# Patient Record
Sex: MALE | Race: WHITE | NOT HISPANIC OR LATINO | Employment: OTHER | URBAN - METROPOLITAN AREA
[De-identification: names, ages, dates, MRNs, and addresses within clinical notes are randomized per-mention and may not be internally consistent; named-entity substitution may affect disease eponyms.]

---

## 2017-02-22 ENCOUNTER — CLINICAL SUPPORT (OUTPATIENT)
Dept: SMOKING CESSATION | Facility: CLINIC | Age: 57
End: 2017-02-22
Payer: COMMERCIAL

## 2017-02-22 DIAGNOSIS — F17.200 NICOTINE DEPENDENCE: Primary | ICD-10-CM

## 2017-02-22 PROCEDURE — 99407 BEHAV CHNG SMOKING > 10 MIN: CPT | Mod: S$GLB,,,

## 2017-02-27 ENCOUNTER — HOSPITAL ENCOUNTER (EMERGENCY)
Facility: OTHER | Age: 57
Discharge: HOME | End: 2017-02-27
Attending: EMERGENCY MEDICINE
Payer: COMMERCIAL

## 2017-02-27 VITALS
BODY MASS INDEX: 40.09 KG/M2 | RESPIRATION RATE: 20 BRPM | HEIGHT: 70 IN | SYSTOLIC BLOOD PRESSURE: 164 MMHG | TEMPERATURE: 99 F | OXYGEN SATURATION: 95 % | HEART RATE: 74 BPM | DIASTOLIC BLOOD PRESSURE: 93 MMHG | WEIGHT: 280 LBS

## 2017-02-27 DIAGNOSIS — J40 BRONCHITIS: Primary | ICD-10-CM

## 2017-02-27 PROCEDURE — 94640 AIRWAY INHALATION TREATMENT: CPT

## 2017-02-27 PROCEDURE — 94760 N-INVAS EAR/PLS OXIMETRY 1: CPT

## 2017-02-27 PROCEDURE — 63600175 PHARM REV CODE 636 W HCPCS: Performed by: EMERGENCY MEDICINE

## 2017-02-27 PROCEDURE — 99284 EMERGENCY DEPT VISIT MOD MDM: CPT | Mod: 25

## 2017-02-27 PROCEDURE — 25000242 PHARM REV CODE 250 ALT 637 W/ HCPCS: Performed by: EMERGENCY MEDICINE

## 2017-02-27 RX ORDER — PREDNISONE 20 MG/1
40 TABLET ORAL DAILY
Qty: 10 TABLET | Refills: 0 | Status: SHIPPED | OUTPATIENT
Start: 2017-02-27 | End: 2017-03-04

## 2017-02-27 RX ORDER — GUAIFENESIN/DEXTROMETHORPHAN 100-10MG/5
5 SYRUP ORAL EVERY 4 HOURS PRN
Qty: 120 ML | Refills: 0
Start: 2017-02-27 | End: 2017-03-09

## 2017-02-27 RX ORDER — PREDNISONE 20 MG/1
60 TABLET ORAL
Status: COMPLETED | OUTPATIENT
Start: 2017-02-27 | End: 2017-02-27

## 2017-02-27 RX ORDER — AZITHROMYCIN 250 MG/1
250 TABLET, FILM COATED ORAL DAILY
Qty: 6 TABLET | Refills: 0 | Status: SHIPPED | OUTPATIENT
Start: 2017-02-27 | End: 2017-03-09

## 2017-02-27 RX ORDER — IPRATROPIUM BROMIDE AND ALBUTEROL SULFATE 2.5; .5 MG/3ML; MG/3ML
3 SOLUTION RESPIRATORY (INHALATION)
Status: COMPLETED | OUTPATIENT
Start: 2017-02-27 | End: 2017-02-27

## 2017-02-27 RX ADMIN — PREDNISONE 60 MG: 20 TABLET ORAL at 02:02

## 2017-02-27 RX ADMIN — IPRATROPIUM BROMIDE AND ALBUTEROL SULFATE 3 ML: .5; 3 SOLUTION RESPIRATORY (INHALATION) at 02:02

## 2017-02-27 NOTE — ED TRIAGE NOTES
Pt states he has been having sob x 1 week. States he has run fever off and on during course of last week. Pt denies chest pain, cough. Denies hx of chf or copd. Pt has no other complaints

## 2017-02-27 NOTE — ED AVS SNAPSHOT
[facility] EMERGENCY DEPARTMENT  [address] [place] [place] [address]               Black Preciado   2017  2:05 PM   ED    Description:  Male : 1960   Department:  [facility] Emergency Department           Your Care was Coordinated By:     Provider Role From To    Trev Jaquez MD Attending Provider 17 1412 --      Reason for Visit     Shortness of Breath           Diagnoses this Visit        Comments    Bronchitis    -  Primary       ED Disposition     ED Disposition Condition Comment    Discharge             To Do List           Follow-up Information     Follow up with Frederick Lau MD In 1 week(s).    Specialty:  Internal Medicine    Contact information:    [address] [place] [address]  [phone]          Follow up with Frederick Lau MD In 1 week.    Specialty:  Internal Medicine    Contact information:    [address] [place] [id number] [phone]         These Medications        Disp Refills Start End    dextromethorphan-guaifenesin  mg/5 ml (ROBITUSSIN-DM)  mg/5 mL liquid 120 mL 0 2017 3/9/2017    Take 5 mLs by mouth every 4 (four) hours as needed. - Oral    Pharmacy: [facility]/pharmacy #[id number]  [address]  [place] Ph #: [phone]       azithromycin (ZITHROMAX Z-TOSHA) 250 MG tablet 6 tablet 0 2017 3/9/2017    Take 1 tablet (250 mg total) by mouth once daily. Take 500 mg initially then 250 mg daily. - Oral    Pharmacy: [facility]/pharmacy #[id number] [address]  [facility] Ph #: [phone]       albuterol sulfate 90 mcg/actuation AePB 1 each 1 2017     Inhale 1 puff into the lungs every 4 (four) hours as needed. Rescue - Inhalation    Pharmacy: [facility]/pharmacy #[id number] [address]  [facility] Ph #: [phone]       predniSONE (DELTASONE) 20 MG tablet 10 tablet 0 2017 3/4/2017    Take 2 tablets (40 mg total) by mouth once daily. - Oral    Pharmacy: [facility]/pharmacy #[id number]  [address]   [place] [facility] #: [phone]         [facility] On Call     [facility] On Call Nurse Care Line - 24/7 Assistance  Registered nurses in the [facility] provide clinical advisement, health education, appointment booking, and other advisory services.  Call for this free service at [phone].             Medications           Message regarding Medications     Verify the changes and/or additions to your medication regime listed below are the same as discussed with your clinician today.  If any of these changes or additions are incorrect, please notify your healthcare provider.        START taking these NEW medications        Refills    dextromethorphan-guaifenesin  mg/5 ml (ROBITUSSIN-DM)  mg/5 mL liquid 0    Sig: Take 5 mLs by mouth every 4 (four) hours as needed.    Class: No Print    Route: Oral    azithromycin (ZITHROMAX Z-TOSHA) 250 MG tablet 0    Sig: Take 1 tablet (250 mg total) by mouth once daily. Take 500 mg initially then 250 mg daily.    Class: Print    Route: Oral    albuterol sulfate 90 mcg/actuation AePB 1    Sig: Inhale 1 puff into the lungs every 4 (four) hours as needed. Rescue    Class: Print    Route: Inhalation    predniSONE (DELTASONE) 20 MG tablet 0    Sig: Take 2 tablets (40 mg total) by mouth once daily.    Class: Print    Route: Oral      These medications were administered today        Dose Freq    albuterol-ipratropium 2.5mg-0.5mg/3mL nebulizer solution 3 mL 3 mL Every 5 min    Sig: Take 3 mLs by nebulization every 5 (five) minutes.    Class: Normal    Route: Nebulization    predniSONE tablet 60 mg 60 mg ED 1 Time    Sig: Take 3 tablets (60 mg total) by mouth ED 1 Time.    Class: Normal    Route: Oral           Verify that the below list of medications is an accurate representation of the medications you are currently taking.  If none reported, the list may be blank. If incorrect, please contact your healthcare provider. Carry this list with you in case of emergency.            Current Medications     albuterol sulfate 90 mcg/actuation AePB Inhale 1 puff into the lungs every 4 (four) hours as needed. Rescue    aspirin (ECOTRIN) 81 MG EC tablet Take 1 tablet (81 mg total) by mouth once daily.    azithromycin (ZITHROMAX Z-TOSHA) 250 MG tablet Take 1 tablet (250 mg total) by mouth once daily. Take 500 mg initially then 250 mg daily.    betaxolol (KERLONE) 10 mg Tab Take 1 tablet (10 mg total) by mouth once daily.    dextromethorphan-guaifenesin  mg/5 ml (ROBITUSSIN-DM)  mg/5 mL liquid Take 5 mLs by mouth every 4 (four) hours as needed.    hydrocodone-acetaminophen 7.5-325mg (NORCO) 7.5-325 mg per tablet TAKE 1 TABLET BY MOUTH EVERY 12 HOURS AS NEEDED FOR PAIN    nicotine (NICODERM CQ) 7 mg/24 hr Place 1 patch onto the skin once daily.    predniSONE (DELTASONE) 20 MG tablet Take 2 tablets (40 mg total) by mouth once daily.           Clinical Reference Information           Your Vitals Were     BP                   146/86 (BP Location: Left arm, Patient Position: Sitting)           Allergies as of 2/27/2017        Reactions    Pcn [Penicillins]       Immunizations Administered on Date of Encounter - 2/27/2017     None      ED Micro, Lab, POCT     None      ED Imaging Orders     Start Ordered       Status Ordering Provider    02/27/17 1418 02/27/17 1417  X-Ray Chest PA And Lateral  1 time imaging      Final result         Discharge Instructions           Acute Bronchitis  Your healthcare provider has told you that you have acute bronchitis. Bronchitis is infection or inflammation of the bronchial tubes (airways in the lungs). Normally, air moves easily in and out of the airways. Bronchitis narrows the airways, making it harder for air to flow in and out of the lungs. This causes symptoms such as shortness of breath, coughing, and wheezing. Bronchitis can be acute or chronic. Acute means the condition comes on quickly and goes away in a short time. Chronic means a  condition lasts a long time and often comes back. Read on to learn more about acute bronchitis.    What causes acute bronchitis?  Acute bronchitis almost always starts as a viral respiratory infection, such as a cold or the flu. Certain factors make it more likely for a cold or flu to turn into bronchitis. These include being very young or very old or having a heart or lung problem. Cigarette smoking also makes bronchitis more likely.  When bronchitis develops, the airways become swollen. The airways may also become infected with bacteria. This is known as a secondary infection.  Diagnosing acute bronchitis  Your healthcare provider will examine you and ask about your symptoms and health history. You may also have a sputum culture to test the fluid in your lungs. Chest X-rays may be done to look for infection in the lungs.  Treating acute bronchitis  Bronchitis usually clears up as the cold or flu goes away. You can help feel better faster by doing the following:  · Take medicine as directed. You may be told to take ibuprofen or other over-the-counter medicines. These help relieve inflammation in your bronchial tubes. Your doctor may prescribe an inhaler to help open up the bronchial tubes. Most of the time, acute bronchitis is caused by a viral infection. Antibiotics are usually not prescribed for viral infections.  · Drink plenty of fluids, such as water, juice, or warm soup. Fluids loosen mucus so that you can cough it up. This helps you breathe more easily. Fluids also prevent dehydration.  · Make sure you get plenty of rest.  · Do not smoke. Do not allow anyone else to smoke in your home.  Recovery and follow-up  Follow up with your doctor as you are told. You will likely feel better in a week or two. But a dry cough can linger beyond that time. Let your doctor know if you still have symptoms (other than a dry cough) after 2 weeks. If youre prone to getting bronchial infections, let your doctor know. And take  steps to protect yourself from future infections. These steps include stopping smoking and avoiding tobacco smoke, washing your hands often, and getting a yearly flu shot.  When to call the doctor  Call the doctor if you have any of the following:  · Fever of 100.4°F (38.0°C) higher  · Symptoms that get worse, or new symptoms  · Trouble breathing  · Symptoms that dont start to improve within a week, or within 3 days of taking antibiotics   Date Last Reviewed: 8/4/2014  © 0104-8755 [employer]. [address] [place] [address], [place] [address]. All rights reserved. This information is not intended as a substitute for professional medical care. Always follow your healthcare professional's instructions.          Bronchitis, Viral (Adult)    You have a viral bronchitis. Bronchitis is inflammation and swelling of the lining of the lungs. This is often caused by an infection. Symptoms include a dry, hacking cough that is worse at night. The cough may bring up yellow-green mucus. You may also feel short of breath or wheeze. Other symptoms may include tiredness, chest discomfort, and chills.  Bronchitis that is caused by a virus is not treated with antibiotics. Instead, medicines may be given to help relieve symptoms. Symptoms can last up to 2 weeks, although the cough may last much longer.  This illness is contagious during the first few days and is spread through the air by coughing and sneezing, or by direct contact (touching the sick person and then touching your own eyes, nose, or mouth).  Most viral illnesses resolve within 10 to 14 days with rest and simple home remedies, although they may sometimes last for several weeks.  Home care  · If symptoms are severe, rest at home for the first 2 to 3 days. When you go back to your usual activities, don't let yourself get too tired.  · Do not smoke. Also avoid being exposed to secondhand smoke.  · You may use over-the-counter medicine to control fever or pain, unless  another pain medicine was prescribed. (Note: If you have chronic liver or kidney disease or have ever had a stomach ulcer or gastrointestinal bleeding, talk with your healthcare provider before using these medicines. Also talk to your provider if you are taking medicine to prevent blood clots.) Aspirin should never be given to anyone younger than 18 years of age who is ill with a viral infection or fever. It may cause severe liver or brain damage.  · Your appetite may be poor, so a light diet is fine. Avoid dehydration by drinking 6 to 8 glasses of fluids per day (such as water, soft drinks, sports drinks, juices, tea, or soup). Extra fluids will help loosen secretions in the nose and lungs.  · Over-the-counter cough, cold, and sore-throat medicines will not shorten the length of the illness, but they may help to reduce symptoms. (Note: Do not use decongestants if you have high blood pressure.)  Follow-up care  Follow up with your healthcare provider, or as advised. If you had an X-ray or ECG (electrocardiogram), a specialist will review it. You will be notified of any new findings that may affect your care.  Note: If you are age 65 or older, or if you have a chronic lung disease or condition that affects your immune system, or you smoke, talk to your healthcare provider about having pneumococcal vaccinations and a yearly influenza vaccination (flu shot).  When to seek medical advice  Call your healthcare provider right away if any of these occur:  · Fever of 100.4°F (38°C) or higher  · Coughing up increased amounts of colored sputum  · Weakness, drowsiness, headache, facial pain, ear pain, or a stiff neck  Call 911, or get immediate medical care  Contact emergency services right away if any of these occur:  · Coughing up blood  · Worsening weakness, drowsiness, headache, or stiff neck  · Trouble breathing, wheezing, or pain with breathing  Date Last Reviewed: 9/13/2015 © 2000-2016 [employer] [address]  [place] [address]. All rights reserved. This information is not intended as a substitute for professional medical care. Always follow your healthcare professional's instructions.          Smoking Cessation     If you would like to quit smoking:   You may be eligible for free services if you are a [place] resident and started smoking cigarettes before September 1, 1988.  Call the Smoking Cessation Trust (SCT) toll free at (781) 644-0525 or (581) 529-4721.   Call 1-800-QUIT-NOW if you do not meet the above criteria.             [facility] Emergency Department complies with applicable Federal civil rights laws and does not discriminate on the basis of race, color, national origin, age, disability, or sex.        Language Assistance Services     ATTENTION: Language assistance services are available, free of charge. Please call [phone].      ATENCIÓN: Si habla español, tiene a whittaker disposición servicios gratuitos de asistencia lingüística. Llame al [phone].     CHÚ Ý: N?u b?n nói Ti?ng Vi?t, có các d?ch v? h? tr? ngôn ng? mi?n phí dành cho b?n. G?i s? [phone].

## 2017-02-27 NOTE — ED PROVIDER NOTES
Encounter Date: 2/27/2017       History     Chief Complaint   Patient presents with    Shortness of Breath     sob x 1 week with audible wheezing. denies cough, chf, copd     Review of patient's allergies indicates:   Allergen Reactions    Pcn [penicillins]      Patient is a 56 y.o. male presenting with the following complaint: shortness of breath. The history is provided by the patient.   Shortness of Breath   This is a new problem. The problem occurs continuously.The current episode started yesterday. The problem has not changed since onset.Associated symptoms include cough and wheezing. Pertinent negatives include no fever, no coryza, no sore throat, no sputum production, no hemoptysis, no PND, no orthopnea, no chest pain, no syncope, no vomiting, no abdominal pain, no rash, no leg pain, no leg swelling and no claudication. He has tried nothing for the symptoms. Associated medical issues do not include asthma, COPD, pneumonia, chronic lung disease, PE, CAD, heart failure, past MI, DVT or recent surgery.     Past Medical History:   Diagnosis Date    Anxiety     History of psychiatric care     Hypertension     AUREA (obstructive sleep apnea)     Primary osteoarthritis of left knee 7/18/2016    Psychiatric problem     Stroke     Syncope and collapse      Past Surgical History:   Procedure Laterality Date    knee scope       Family History   Problem Relation Age of Onset    Hypertension Mother     Diabetes Mother     Heart disease Mother     Heart disease Father     Cataracts Father     No Known Problems Sister     No Known Problems Brother     No Known Problems Maternal Aunt     No Known Problems Maternal Uncle     No Known Problems Paternal Aunt     No Known Problems Paternal Uncle     No Known Problems Maternal Grandmother     No Known Problems Maternal Grandfather     No Known Problems Paternal Grandmother     No Known Problems Paternal Grandfather     Amblyopia Neg Hx     Blindness Neg  Hx     Cancer Neg Hx     Glaucoma Neg Hx     Macular degeneration Neg Hx     Retinal detachment Neg Hx     Strabismus Neg Hx     Stroke Neg Hx     Thyroid disease Neg Hx      Social History   Substance Use Topics    Smoking status: Former Smoker     Packs/day: 0.75     Quit date: 8/29/2016    Smokeless tobacco: None    Alcohol use Yes     Review of Systems   Constitutional: Negative.  Negative for fever.   HENT: Negative.  Negative for sore throat.    Eyes: Negative.    Respiratory: Positive for cough, shortness of breath and wheezing. Negative for hemoptysis and sputum production.    Cardiovascular: Negative.  Negative for chest pain, orthopnea, claudication, leg swelling, syncope and PND.   Gastrointestinal: Negative.  Negative for abdominal pain and vomiting.   Endocrine: Negative.    Genitourinary: Negative.    Musculoskeletal: Negative.    Skin: Negative.  Negative for rash.   Allergic/Immunologic: Negative.    Neurological: Negative.    Hematological: Negative.    Psychiatric/Behavioral: Negative.    All other systems reviewed and are negative.      Physical Exam   Initial Vitals   BP Pulse Resp Temp SpO2   02/27/17 1409 02/27/17 1409 02/27/17 1409 02/27/17 1409 02/27/17 1409   146/86 64 22 98.6 °F (37 °C) 96 %     Physical Exam    Nursing note and vitals reviewed.  Constitutional: Vital signs are normal. He appears well-developed. He is active and cooperative.   HENT:   Head: Normocephalic and atraumatic.   Eyes: Conjunctivae, EOM and lids are normal. Pupils are equal, round, and reactive to light.   Neck: Trachea normal and full passive range of motion without pain. Neck supple. No thyroid mass present.   Cardiovascular: Normal rate, regular rhythm, S1 normal, S2 normal, normal heart sounds, intact distal pulses and normal pulses.   Pulmonary/Chest: He has wheezes.   Abdominal: Soft. Normal appearance, normal aorta and bowel sounds are normal.   Musculoskeletal: Normal range of motion.    Lymphadenopathy:     He has no axillary adenopathy.   Neurological: He is alert and oriented to person, place, and time.   Skin: Skin is warm, dry and intact.   Psychiatric: He has a normal mood and affect. His speech is normal and behavior is normal. Judgment and thought content normal. Cognition and memory are normal.         ED Course   Procedures  Labs Reviewed - No data to display     Imaging Results         X-Ray Chest PA And Lateral (Final result) Result time:  02/27/17 15:07:26    Final result by Interface, Rad Results In (02/27/17 15:07:26)    Narrative:    Study Desc:   XR CHEST PA AND LATERAL  Clinical History: Asthma  Comparison: None     FINDINGS:  The PA and lateral chest radiographs shows normal lung volumes without interstitial or   airspace opacities, pleural effusions or pneumothorax.  The heart size and pulmonary vasculature are normal. The trachea is midline. Osseous   structures are unremarkable.  Likely implanted anterior chest cardiac monitor.     IMPRESSION:  No chest radiographic evidence of acute cardiopulmonary disease.     SL: 24 Signed by: Blayne Collier MD  2017-02-27 15:07:29 [CAST]                  Imaging Results         X-Ray Chest PA And Lateral (Final result) Result time:  02/27/17 15:07:26    Final result by Interface, Rad Results In (02/27/17 15:07:26)    Narrative:    Study Desc:   XR CHEST PA AND LATERAL  Clinical History: Asthma  Comparison: None     FINDINGS:  The PA and lateral chest radiographs shows normal lung volumes without interstitial or   airspace opacities, pleural effusions or pneumothorax.  The heart size and pulmonary vasculature are normal. The trachea is midline. Osseous   structures are unremarkable.  Likely implanted anterior chest cardiac monitor.     IMPRESSION:  No chest radiographic evidence of acute cardiopulmonary disease.     SL: 24 Signed by: Blayne Collier MD  2017-02-27 15:07:29 [CAST]              Medical Decision Making:   ED  Management:  The patient received 3 DuoNeb's and oral steroids while in the department.  Reevaluation of his lung fields reveal resolution of the bilateral diffuse wheezing.                   ED Course     Clinical Impression:   The encounter diagnosis was Bronchitis.          Trev Jaquez MD  02/27/17 1526

## 2017-02-27 NOTE — DISCHARGE INSTRUCTIONS
Acute Bronchitis  Your healthcare provider has told you that you have acute bronchitis. Bronchitis is infection or inflammation of the bronchial tubes (airways in the lungs). Normally, air moves easily in and out of the airways. Bronchitis narrows the airways, making it harder for air to flow in and out of the lungs. This causes symptoms such as shortness of breath, coughing, and wheezing. Bronchitis can be acute or chronic. Acute means the condition comes on quickly and goes away in a short time. Chronic means a condition lasts a long time and often comes back. Read on to learn more about acute bronchitis.    What causes acute bronchitis?  Acute bronchitis almost always starts as a viral respiratory infection, such as a cold or the flu. Certain factors make it more likely for a cold or flu to turn into bronchitis. These include being very young or very old or having a heart or lung problem. Cigarette smoking also makes bronchitis more likely.  When bronchitis develops, the airways become swollen. The airways may also become infected with bacteria. This is known as a secondary infection.  Diagnosing acute bronchitis  Your healthcare provider will examine you and ask about your symptoms and health history. You may also have a sputum culture to test the fluid in your lungs. Chest X-rays may be done to look for infection in the lungs.  Treating acute bronchitis  Bronchitis usually clears up as the cold or flu goes away. You can help feel better faster by doing the following:  · Take medicine as directed. You may be told to take ibuprofen or other over-the-counter medicines. These help relieve inflammation in your bronchial tubes. Your doctor may prescribe an inhaler to help open up the bronchial tubes. Most of the time, acute bronchitis is caused by a viral infection. Antibiotics are usually not prescribed for viral infections.  · Drink plenty of fluids, such as water, juice, or warm soup. Fluids loosen mucus  so that you can cough it up. This helps you breathe more easily. Fluids also prevent dehydration.  · Make sure you get plenty of rest.  · Do not smoke. Do not allow anyone else to smoke in your home.  Recovery and follow-up  Follow up with your doctor as you are told. You will likely feel better in a week or two. But a dry cough can linger beyond that time. Let your doctor know if you still have symptoms (other than a dry cough) after 2 weeks. If youre prone to getting bronchial infections, let your doctor know. And take steps to protect yourself from future infections. These steps include stopping smoking and avoiding tobacco smoke, washing your hands often, and getting a yearly flu shot.  When to call the doctor  Call the doctor if you have any of the following:  · Fever of 100.4°F (38.0°C) higher  · Symptoms that get worse, or new symptoms  · Trouble breathing  · Symptoms that dont start to improve within a week, or within 3 days of taking antibiotics   Date Last Reviewed: 8/4/2014  © 1400-9341 [employer]. [address] [place] [address]. All rights reserved. This information is not intended as a substitute for professional medical care. Always follow your healthcare professional's instructions.          Bronchitis, Viral (Adult)    You have a viral bronchitis. Bronchitis is inflammation and swelling of the lining of the lungs. This is often caused by an infection. Symptoms include a dry, hacking cough that is worse at night. The cough may bring up yellow-green mucus. You may also feel short of breath or wheeze. Other symptoms may include tiredness, chest discomfort, and chills.  Bronchitis that is caused by a virus is not treated with antibiotics. Instead, medicines may be given to help relieve symptoms. Symptoms can last up to 2 weeks, although the cough may last much longer.  This illness is contagious during the first few days and is spread through the air by coughing and sneezing,  or by direct contact (touching the sick person and then touching your own eyes, nose, or mouth).  Most viral illnesses resolve within 10 to 14 days with rest and simple home remedies, although they may sometimes last for several weeks.  Home care  · If symptoms are severe, rest at home for the first 2 to 3 days. When you go back to your usual activities, don't let yourself get too tired.  · Do not smoke. Also avoid being exposed to secondhand smoke.  · You may use over-the-counter medicine to control fever or pain, unless another pain medicine was prescribed. (Note: If you have chronic liver or kidney disease or have ever had a stomach ulcer or gastrointestinal bleeding, talk with your healthcare provider before using these medicines. Also talk to your provider if you are taking medicine to prevent blood clots.) Aspirin should never be given to anyone younger than 18 years of age who is ill with a viral infection or fever. It may cause severe liver or brain damage.  · Your appetite may be poor, so a light diet is fine. Avoid dehydration by drinking 6 to 8 glasses of fluids per day (such as water, soft drinks, sports drinks, juices, tea, or soup). Extra fluids will help loosen secretions in the nose and lungs.  · Over-the-counter cough, cold, and sore-throat medicines will not shorten the length of the illness, but they may help to reduce symptoms. (Note: Do not use decongestants if you have high blood pressure.)  Follow-up care  Follow up with your healthcare provider, or as advised. If you had an X-ray or ECG (electrocardiogram), a specialist will review it. You will be notified of any new findings that may affect your care.  Note: If you are age 65 or older, or if you have a chronic lung disease or condition that affects your immune system, or you smoke, talk to your healthcare provider about having pneumococcal vaccinations and a yearly influenza vaccination (flu shot).  When to seek medical advice  Call your  healthcare provider right away if any of these occur:  · Fever of 100.4°F (38°C) or higher  · Coughing up increased amounts of colored sputum  · Weakness, drowsiness, headache, facial pain, ear pain, or a stiff neck  Call 911, or get immediate medical care  Contact emergency services right away if any of these occur:  · Coughing up blood  · Worsening weakness, drowsiness, headache, or stiff neck  · Trouble breathing, wheezing, or pain with breathing  Date Last Reviewed: 9/13/2015 © 2000-2016 [employer]. [address] [place] [address]. All rights reserved. This information is not intended as a substitute for professional medical care. Always follow your healthcare professional's instructions.

## 2017-08-25 ENCOUNTER — TELEPHONE (OUTPATIENT)
Dept: ELECTROPHYSIOLOGY | Facility: CLINIC | Age: 57
End: 2017-08-25

## 2017-08-25 DIAGNOSIS — R55 SYNCOPE, UNSPECIFIED SYNCOPE TYPE: Primary | ICD-10-CM

## 2017-08-25 DIAGNOSIS — R55 VASOVAGAL SYNCOPE: Primary | ICD-10-CM

## 2017-08-25 DIAGNOSIS — Z95.9 UNSPECIFIED CARDIAC DEVICE IN SITU: ICD-10-CM

## 2017-08-25 NOTE — TELEPHONE ENCOUNTER
----- Message from Jade Mccormick sent at 8/25/2017 10:51 AM CDT -----  Contact: patient  Please call pt at [phone]. Would like to schedule an appt with Dr Villeda, loop recorder check and EKG asap because of BP concerns    Thank you

## 2017-08-28 ENCOUNTER — LAB VISIT (OUTPATIENT)
Dept: LAB | Facility: HOSPITAL | Age: 57
End: 2017-08-28
Attending: INTERNAL MEDICINE
Payer: COMMERCIAL

## 2017-08-28 ENCOUNTER — OFFICE VISIT (OUTPATIENT)
Dept: FAMILY MEDICINE | Facility: CLINIC | Age: 57
End: 2017-08-28
Payer: COMMERCIAL

## 2017-08-28 VITALS
DIASTOLIC BLOOD PRESSURE: 88 MMHG | BODY MASS INDEX: 42.3 KG/M2 | OXYGEN SATURATION: 98 % | HEART RATE: 61 BPM | WEIGHT: 295.44 LBS | SYSTOLIC BLOOD PRESSURE: 130 MMHG | TEMPERATURE: 98 F | HEIGHT: 70 IN

## 2017-08-28 DIAGNOSIS — M17.12 PRIMARY OSTEOARTHRITIS OF LEFT KNEE: Chronic | ICD-10-CM

## 2017-08-28 DIAGNOSIS — R00.2 PALPITATIONS: ICD-10-CM

## 2017-08-28 DIAGNOSIS — Z00.00 ROUTINE MEDICAL EXAM: Primary | ICD-10-CM

## 2017-08-28 DIAGNOSIS — Z00.00 ROUTINE MEDICAL EXAM: ICD-10-CM

## 2017-08-28 DIAGNOSIS — E66.01 MORBID OBESITY WITH BMI OF 40.0-44.9, ADULT: ICD-10-CM

## 2017-08-28 DIAGNOSIS — F17.200 TOBACCO DEPENDENCE: Chronic | ICD-10-CM

## 2017-08-28 LAB
ALBUMIN SERPL BCP-MCNC: 3.5 G/DL
ALP SERPL-CCNC: 72 U/L
ALT SERPL W/O P-5'-P-CCNC: 38 U/L
ANION GAP SERPL CALC-SCNC: 6 MMOL/L
AST SERPL-CCNC: 26 U/L
BASOPHILS # BLD AUTO: 0.03 K/UL
BASOPHILS NFR BLD: 0.4 %
BILIRUB SERPL-MCNC: 0.4 MG/DL
BUN SERPL-MCNC: 10 MG/DL
CALCIUM SERPL-MCNC: 8.9 MG/DL
CHLORIDE SERPL-SCNC: 108 MMOL/L
CHOLEST/HDLC SERPL: 5 {RATIO}
CO2 SERPL-SCNC: 26 MMOL/L
CREAT SERPL-MCNC: 0.9 MG/DL
DIFFERENTIAL METHOD: NORMAL
EOSINOPHIL # BLD AUTO: 0.5 K/UL
EOSINOPHIL NFR BLD: 6.4 %
ERYTHROCYTE [DISTWIDTH] IN BLOOD BY AUTOMATED COUNT: 13.8 %
EST. GFR  (AFRICAN AMERICAN): >60 ML/MIN/1.73 M^2
EST. GFR  (NON AFRICAN AMERICAN): >60 ML/MIN/1.73 M^2
ESTIMATED AVG GLUCOSE: 105 MG/DL
GLUCOSE SERPL-MCNC: 94 MG/DL
HBA1C MFR BLD HPLC: 5.3 %
HCT VFR BLD AUTO: 42 %
HDL/CHOLESTEROL RATIO: 19.8 %
HDLC SERPL-MCNC: 212 MG/DL
HDLC SERPL-MCNC: 42 MG/DL
HGB BLD-MCNC: 14.1 G/DL
LDLC SERPL CALC-MCNC: 137.4 MG/DL
LYMPHOCYTES # BLD AUTO: 2.7 K/UL
LYMPHOCYTES NFR BLD: 38.1 %
MCH RBC QN AUTO: 30.4 PG
MCHC RBC AUTO-ENTMCNC: 33.6 G/DL
MCV RBC AUTO: 91 FL
MONOCYTES # BLD AUTO: 0.7 K/UL
MONOCYTES NFR BLD: 10.3 %
NEUTROPHILS # BLD AUTO: 3.2 K/UL
NEUTROPHILS NFR BLD: 44.5 %
NONHDLC SERPL-MCNC: 170 MG/DL
PLATELET # BLD AUTO: 212 K/UL
PMV BLD AUTO: 12.6 FL
POTASSIUM SERPL-SCNC: 4.6 MMOL/L
PROT SERPL-MCNC: 6.8 G/DL
RBC # BLD AUTO: 4.64 M/UL
SODIUM SERPL-SCNC: 140 MMOL/L
TRIGL SERPL-MCNC: 163 MG/DL
TSH SERPL DL<=0.005 MIU/L-ACNC: 1.15 UIU/ML
WBC # BLD AUTO: 7.2 K/UL

## 2017-08-28 PROCEDURE — 83036 HEMOGLOBIN GLYCOSYLATED A1C: CPT

## 2017-08-28 PROCEDURE — 85025 COMPLETE CBC W/AUTO DIFF WBC: CPT

## 2017-08-28 PROCEDURE — 36415 COLL VENOUS BLD VENIPUNCTURE: CPT | Mod: PO

## 2017-08-28 PROCEDURE — 99999 PR PBB SHADOW E&M-EST. PATIENT-LVL III: CPT | Mod: PBBFAC,,, | Performed by: INTERNAL MEDICINE

## 2017-08-28 PROCEDURE — 93005 ELECTROCARDIOGRAM TRACING: CPT | Mod: S$GLB,,, | Performed by: INTERNAL MEDICINE

## 2017-08-28 PROCEDURE — 80061 LIPID PANEL: CPT

## 2017-08-28 PROCEDURE — 99396 PREV VISIT EST AGE 40-64: CPT | Mod: S$GLB,,, | Performed by: INTERNAL MEDICINE

## 2017-08-28 PROCEDURE — 93010 ELECTROCARDIOGRAM REPORT: CPT | Mod: S$GLB,,, | Performed by: INTERNAL MEDICINE

## 2017-08-28 PROCEDURE — 84443 ASSAY THYROID STIM HORMONE: CPT

## 2017-08-28 PROCEDURE — 80053 COMPREHEN METABOLIC PANEL: CPT

## 2017-08-28 NOTE — PROGRESS NOTES
Chief Complaint  Chief Complaint   Patient presents with    Hypertension    Annual Exam       HPI  Black Preciado is a 57 y.o. male with multiple medical diagnoses as listed in the medical history and problem list that presents for HTN follow up.  Pt is known to me with his last appointment 07/2016.  He took his home BP readings at home and noted his pressure was 180s/100s.  Checked with wrist cuff.  He notes that he was having heavy sweating at the time but denies CP.  He was out in the heat.  He notes some palpitations and left shoulder pain.  No dizziness or lightheadedness.  He had nearly quit smoking but is back to smoking.  He is also due for a routine physical      PAST MEDICAL HISTORY:  Past Medical History:   Diagnosis Date    Anxiety     History of psychiatric care     Hypertension     Major depression, single episode 9/13/2013    AUREA (obstructive sleep apnea)     Primary osteoarthritis of left knee 7/18/2016    Psychiatric problem     Stroke     Syncope and collapse        PAST SURGICAL HISTORY:  Past Surgical History:   Procedure Laterality Date    knee scope         SOCIAL HISTORY:  Social History     Social History    Marital status:      Spouse name: N/A    Number of children: N/A    Years of education: N/A     Occupational History    Not on file.     Social History Main Topics    Smoking status: Current Some Day Smoker     Packs/day: 0.75     Last attempt to quit: 8/29/2016    Smokeless tobacco: Never Used    Alcohol use Yes    Drug use: No    Sexual activity: Not on file     Other Topics Concern    Not on file     Social History Narrative    No narrative on file       FAMILY HISTORY:  Family History   Problem Relation Age of Onset    Hypertension Mother     Diabetes Mother     Heart disease Mother     Heart disease Father     Cataracts Father     No Known Problems Sister     No Known Problems Brother     No Known Problems Maternal Aunt     No Known Problems  Maternal Uncle     No Known Problems Paternal Aunt     No Known Problems Paternal Uncle     No Known Problems Maternal Grandmother     No Known Problems Maternal Grandfather     No Known Problems Paternal Grandmother     No Known Problems Paternal Grandfather     Amblyopia Neg Hx     Blindness Neg Hx     Cancer Neg Hx     Glaucoma Neg Hx     Macular degeneration Neg Hx     Retinal detachment Neg Hx     Strabismus Neg Hx     Stroke Neg Hx     Thyroid disease Neg Hx        ALLERGIES AND MEDICATIONS: updated and reviewed.  Review of patient's allergies indicates:   Allergen Reactions    Pcn [penicillins]      Current Outpatient Prescriptions   Medication Sig Dispense Refill    betaxolol (KERLONE) 10 mg Tab Take 1 tablet (10 mg total) by mouth once daily. 90 tablet 3    albuterol sulfate 90 mcg/actuation AePB Inhale 1 puff into the lungs every 4 (four) hours as needed. Rescue 1 each 1    aspirin (ECOTRIN) 81 MG EC tablet Take 1 tablet (81 mg total) by mouth once daily. 90 tablet 1    hydrocodone-acetaminophen 7.5-325mg (NORCO) 7.5-325 mg per tablet TAKE 1 TABLET BY MOUTH EVERY 12 HOURS AS NEEDED FOR PAIN  0    nicotine (NICODERM CQ) 7 mg/24 hr Place 1 patch onto the skin once daily. 14 patch 0     No current facility-administered medications for this visit.          ROS  Review of Systems   Constitutional: Negative for chills, fever and unexpected weight change.   HENT: Negative for congestion, dental problem, ear pain, hearing loss, rhinorrhea, sore throat and trouble swallowing.    Eyes: Negative for discharge, redness and visual disturbance.   Respiratory: Negative for cough, chest tightness, shortness of breath and wheezing.    Cardiovascular: Positive for palpitations. Negative for chest pain and leg swelling.   Gastrointestinal: Negative for abdominal pain, constipation, diarrhea, nausea and vomiting.   Endocrine: Negative for polydipsia, polyphagia and polyuria.   Genitourinary: Negative for  "decreased urine volume, dysuria and hematuria.   Musculoskeletal: Positive for arthralgias (left knee pain). Negative for myalgias.   Skin: Negative for color change and rash.   Neurological: Negative for dizziness, weakness, light-headedness and headaches.   Psychiatric/Behavioral: Negative for decreased concentration, dysphoric mood, sleep disturbance and suicidal ideas.           Physical Exam  Vitals:    08/28/17 1132 08/28/17 1150   BP: (!) 138/90 130/88   BP Location: Left arm    Patient Position: Sitting    BP Method: Large (Manual)    Pulse: 61    Temp: 98.3 °F (36.8 °C)    SpO2: 98%    Weight: 134 kg (295 lb 6.7 oz)    Height: 5' 10" (1.778 m)     Body mass index is 42.39 kg/m².  Weight: 134 kg (295 lb 6.7 oz)   Height: 5' 10" (177.8 cm)   General appearance: alert, appears stated age, cooperative and no distress  Head: Normocephalic, without obvious abnormality, atraumatic  Eyes: PERRL EOMI conjunctiva clear  Ears: normal TM's and external ear canals both ears  Nose: Nares normal. Septum midline. Mucosa normal. No drainage or sinus tenderness.  Throat: lips, mucosa, and tongue normal; teeth and gums normal  Neck: no adenopathy, no carotid bruit, no JVD, supple, symmetrical, trachea midline and thyroid not enlarged, symmetric, no tenderness/mass/nodules  Back: symmetric, no curvature. ROM normal. No CVA tenderness.  Lungs: clear to auscultation bilaterally  Heart: regular rate and rhythm, S1, S2 normal, no murmur, click, rub or gallop  Abdomen: soft, non-tender; bowel sounds normal; no masses,  no organomegaly  Extremities: extremities normal, atraumatic, no cyanosis or edema  Pulses: 2+ and symmetric  Neurologic: Mental status: Alert, oriented, thought content appropriate  Sensory: normal  Motor:grossly normal  Coordination: normal  Gait: Normal   Symmetric facial movements palate elevated symmetrically tongue midline         Health Maintenance       Date Due Completion Date    TETANUS VACCINE 08/28/1980 " ---    Influenza Vaccine 08/01/2017 ---    Lipid Panel 07/21/2021 7/21/2016    Colonoscopy 05/16/2024 5/16/2014            Assessment & Plan  Problem List Items Addressed This Visit        Endocrine    Morbid obesity with BMI of 40.0-44.9, adult (Chronic)    Current Assessment & Plan     Has recently.  Lost 10# with portion control.  The patient is asked to make an attempt to improve diet and exercise patterns to aid in medical management of this problem.             Orthopedic    Primary osteoarthritis of left knee (Chronic)    Overview     Dr. Maldonado         Current Assessment & Plan     Planned for replacement this year but he would like to get some weight off first.             Other    Tobacco dependence (Chronic)    Overview     Around 30 pack year history         Current Assessment & Plan     He will be getting back in touch with smoking cessation           Other Visit Diagnoses     Routine medical exam    -  Primary  -   Discussed healthy diet, regular exercise, necessary labs, age appropriate cancer screening, and routine vaccinations.   The natural history of prostate cancer and ongoing controversy regarding screening and potential treatment outcomes of prostate cancer has been discussed with the patient. The meaning of a false positive PSA and a false negative PSA has been discussed. He indicates understanding of the limitations of this screening test and wishes not to proceed with screening PSA testing at this time.     Relevant Orders    CBC auto differential    Comprehensive metabolic panel    Lipid panel    Hemoglobin A1c    TSH    Palpitations    -  EKG per my read shows sinus tanaj without AV block, prolonged QTc, Q wave, TWI or ST changes.       Relevant Orders    EKG 12-lead            Health Maintenance reviewed, Flu in Oct.    Follow-up: Return in about 6 months (around 2/28/2018) for follow up for chronic conditions.

## 2017-08-28 NOTE — ASSESSMENT & PLAN NOTE
Has recently.  Lost 10# with portion control.  The patient is asked to make an attempt to improve diet and exercise patterns to aid in medical management of this problem.

## 2017-08-29 ENCOUNTER — PATIENT MESSAGE (OUTPATIENT)
Dept: FAMILY MEDICINE | Facility: CLINIC | Age: 57
End: 2017-08-29

## 2017-08-29 DIAGNOSIS — E78.00 PURE HYPERCHOLESTEROLEMIA: ICD-10-CM

## 2017-08-29 RX ORDER — ATORVASTATIN CALCIUM 20 MG/1
20 TABLET, FILM COATED ORAL DAILY
Qty: 90 TABLET | Refills: 3 | Status: SHIPPED | OUTPATIENT
Start: 2017-08-29 | End: 2018-10-25 | Stop reason: SDUPTHER

## 2017-08-30 ENCOUNTER — CLINICAL SUPPORT (OUTPATIENT)
Dept: SMOKING CESSATION | Facility: CLINIC | Age: 57
End: 2017-08-30
Payer: COMMERCIAL

## 2017-08-30 DIAGNOSIS — F17.200 NICOTINE DEPENDENCE: Primary | ICD-10-CM

## 2017-08-30 PROCEDURE — 99407 BEHAV CHNG SMOKING > 10 MIN: CPT | Mod: S$GLB,,,

## 2017-08-31 ENCOUNTER — CLINICAL SUPPORT (OUTPATIENT)
Dept: SMOKING CESSATION | Facility: CLINIC | Age: 57
End: 2017-08-31
Payer: COMMERCIAL

## 2017-08-31 DIAGNOSIS — F17.200 NICOTINE DEPENDENCE: Primary | ICD-10-CM

## 2017-08-31 PROCEDURE — 99404 PREV MED CNSL INDIV APPRX 60: CPT | Mod: S$GLB,,,

## 2017-08-31 PROCEDURE — 99999 PR PBB SHADOW E&M-EST. PATIENT-LVL I: CPT | Mod: PBBFAC,,,

## 2017-08-31 RX ORDER — IBUPROFEN 200 MG
1 TABLET ORAL DAILY
Qty: 14 PATCH | Refills: 0 | Status: SHIPPED | OUTPATIENT
Start: 2017-08-31 | End: 2017-09-27 | Stop reason: DRUGHIGH

## 2017-08-31 RX ORDER — DM/P-EPHED/ACETAMINOPH/DOXYLAM 30-7.5/3
2 LIQUID (ML) ORAL
Qty: 108 LOZENGE | Refills: 0 | Status: SHIPPED | OUTPATIENT
Start: 2017-08-31 | End: 2017-09-30

## 2017-08-31 RX ORDER — MICONAZOLE NITRATE 2 %
2 CREAM (GRAM) TOPICAL
Qty: 100 EACH | Refills: 0 | Status: SHIPPED | OUTPATIENT
Start: 2017-08-31 | End: 2017-09-30

## 2017-09-12 ENCOUNTER — CLINICAL SUPPORT (OUTPATIENT)
Dept: SMOKING CESSATION | Facility: CLINIC | Age: 57
End: 2017-09-12
Payer: COMMERCIAL

## 2017-09-12 DIAGNOSIS — F17.200 NICOTINE DEPENDENCE: ICD-10-CM

## 2017-09-12 PROCEDURE — 99999 PR PBB SHADOW E&M-EST. PATIENT-LVL I: CPT | Mod: PBBFAC,,,

## 2017-09-12 PROCEDURE — 90853 GROUP PSYCHOTHERAPY: CPT | Mod: S$GLB,,,

## 2017-09-12 NOTE — Clinical Note
Patient's Response to Intervention: pt is doing well, he presents for group session smoking 1 yesterday and goes days without smoking at all, his trigger is being around others who are smoking, he is working on this weakness and gaining the willpower will be the determining factor in his quit, he is currently on the 14mg nicotine patch daily and doing well with no negative complaints, will continue to monitor and follow in group, advised to stay clear from other smokers at this time, he is not buying cigarettes

## 2017-09-14 NOTE — PROGRESS NOTES
Smoking Cessation Group Session #5    Site: [facility]   Date:  9/14/2017  Clinical Status of Patient: Outpatient   Length of Service and Code: 90 minutes - 68176   Number in Attendance: 4  Group Activities/Focus of Group:  Sharing last weeks challenges, triggers, and coping activities to remain quit and/ or keep making progress toward cessation, completion of TCRS (Tobacco Cessation Rating Scale) learned addiction model, personal reasons for quitting, medications, goals, quit date.    Specific session focus: Smoking cessation group # 5    Target Symptoms: lapses and relapses, assessing the situation, ways to cope with a slip,   The following were rated moderate (3) to severe (4) on TCRS:   Moderate (3): none  Severe (4): none  Patients Response to intervention: the patient is smoking 3 per day of cigarettes, discussed quit date, pt remains on the prescribed tobacco cessation medication regimen of 1mg of chantix po bid, 14mg nicoderm CQ patch daily, without any negative s/e at this time.      Target symptoms:  withdrawal and medication side effects             The following were rated moderate (3) to severe (4) on TCRS:       Moderate 3: 0     Severe 4:   0  Patient's Response to Intervention: pt is doing well, he presents for group session smoking 1 yesterday and goes days without smoking at all, his trigger is being around others who are smoking, he is working on this weakness and gaining the willpower will be the determining factor in his quit, he is currently on the 14mg nicotine patch daily and doing well with no negative complaints, will continue to monitor and follow in group, advised to stay clear from other smokers at this time, he is not buying cigarettes  Progress Toward Goals and Other Mental Status Changes: progressing towards his goal with no mental status changes noted   Interval History: n/a    Diagnosis: Z72.0  Plan: The patient will continue with group therapy sessions and  medication regimen prescribed with management by physician or by the Cessation Clinic Provider. Patient will inform Smoking Cessation Counselor of symptoms as rated high on TCRS.    Return to Clinic: 2 weeks    Quit Date: not selected at this time   Planned Quit Date: not selected at this time

## 2017-09-19 ENCOUNTER — CLINICAL SUPPORT (OUTPATIENT)
Dept: SMOKING CESSATION | Facility: CLINIC | Age: 57
End: 2017-09-19
Payer: COMMERCIAL

## 2017-09-19 DIAGNOSIS — F17.200 NICOTINE DEPENDENCE: ICD-10-CM

## 2017-09-19 PROCEDURE — 90853 GROUP PSYCHOTHERAPY: CPT | Mod: S$GLB,,,

## 2017-09-19 PROCEDURE — 99999 PR PBB SHADOW E&M-EST. PATIENT-LVL I: CPT | Mod: PBBFAC,,,

## 2017-09-19 NOTE — Clinical Note
"Patient's Response to Intervention: pt presents for group smoking 2-3 cigarettes per day and other days no smoking any, his trigger is being around other smokers, he is currently on the 14mg nicotine patch daily, he also has the 2mg nicotine gum however does not like the mint flavor, provided patient with a sample of a 4mg fruit flavored nicotine gum to use in place of a cigarette to see if he would prefer this flavor over the mint, he is workin on his willpower and his control, he gets weak when around other smokers, he is bumming cigarettes, discussed using the cigarettes for coping reason and "holding on" to the last few cigarettes?? Co=16, will continue to monitor and follow, recommend continuing the same dose 14mg nicotine patch at this time, patient has another week of patches left therefore will not need"

## 2017-09-20 NOTE — PROGRESS NOTES
"Smoking Cessation Group Session #5    Site: [facility]   Date:  9/20/2017  Clinical Status of Patient: Outpatient   Length of Service and Code: 90 minutes - 57138   Number in Attendance: 4  Group Activities/Focus of Group:  Sharing last weeks challenges, triggers, and coping activities to remain quit and/ or keep making progress toward cessation, completion of TCRS (Tobacco Cessation Rating Scale) learned addiction model, personal reasons for quitting, medications, goals, quit date.    Specific session focus: Smoking cessation group # 5    Target Symptoms: lapses and relapses, assessing the situation, ways to cope with a slip,   The following were rated moderate (3) to severe (4) on TCRS:   Moderate (3): none  Severe (4): none  Patients Response to intervention: the patient is smoking 3 per day of cigarettes, discussed quit date, pt remains on the prescribed tobacco cessation medication regimen of 1mg of chantix po bid, 14mg nicoderm CQ patch daily, without any negative s/e at this time.      Target symptoms:  withdrawal and medication side effects             The following were rated moderate (3) to severe (4) on TCRS:       Moderate 3: 0     Severe 4:   0  Patient's Response to Intervention: pt presents for group smoking 2-3 cigarettes per day and other days no smoking any, his trigger is being around other smokers, he is currently on the 14mg nicotine patch daily, he also has the 2mg nicotine gum however does not like the mint flavor, provided patient with a sample of a 4mg fruit flavored nicotine gum to use in place of a cigarette to see if he would prefer this flavor over the mint, he is workin on his willpower and his control, he gets weak when around other smokers, he is bumming cigarettes, discussed using the cigarettes for coping reason and "holding on" to the last few cigarettes?? Co=16, will continue to monitor and follow, recommend continuing the same dose 14mg nicotine patch at this " time, patient has another week of patches left therefore will not need another refill until next week.   Progress Toward Goals and Other Mental Status Changes: progressing with no mental status changes noted   Interval History: n/a    Diagnosis: Z72.0  Plan: The patient will continue with group therapy sessions and medication regimen prescribed with management by physician or by the Cessation Clinic Provider. Patient will inform Smoking Cessation Counselor of symptoms as rated high on TCRS.    Return to Clinic: 1 week    Quit Date: not selected at this time    Planned Quit Date: not selected at this time

## 2017-09-26 ENCOUNTER — CLINICAL SUPPORT (OUTPATIENT)
Dept: SMOKING CESSATION | Facility: CLINIC | Age: 57
End: 2017-09-26
Payer: COMMERCIAL

## 2017-09-26 DIAGNOSIS — F17.200 NICOTINE DEPENDENCE: Primary | ICD-10-CM

## 2017-09-26 PROCEDURE — 99999 PR PBB SHADOW E&M-EST. PATIENT-LVL I: CPT | Mod: PBBFAC,,,

## 2017-09-26 PROCEDURE — 90853 GROUP PSYCHOTHERAPY: CPT | Mod: S$GLB,,,

## 2017-09-26 NOTE — Clinical Note
Pt continues to smoke 2-4 cpd. Patient remains on prescribed tobacco cessation medication regimen of 14 mg patch, without any negative side effects at this time. Will wean to 7 mg patches with this visit, will send refill to pharmacy. Exhaled carbon monoxide level was 12 ppm per [employer] (0-6 non-smoker). Pt is to f/u next week for group session.

## 2017-09-27 RX ORDER — NICOTINE 7MG/24HR
1 PATCH, TRANSDERMAL 24 HOURS TRANSDERMAL DAILY
Qty: 14 PATCH | Refills: 0 | Status: SHIPPED | OUTPATIENT
Start: 2017-09-27 | End: 2017-11-08 | Stop reason: DRUGHIGH

## 2017-09-27 NOTE — PROGRESS NOTES
Smoking Cessation Group Session #6    Site: [facility]  Date:  9/26/17  Clinical Status of Patient: Outpatient   Length of Service and Code: 90 minutes - 21347   Number in Attendance: 6  Group Activities/Focus of Group:  completion of TCRS (Tobacco Cessation Rating Scale) reviewed strategies, cues, triggers, high risk situations, lapses, relapses, diet, exercise, stress, relaxation, sleep, habitual behavior, and life style changes.  Target symptoms:  withdrawal and medication side effects             The following were rated moderate (3) to severe (4) on TCRS:       Moderate 3: none     Severe 4:   none  Patient's Response to Intervention: Pt continues to smoke 2-4 cpd. Patient remains on prescribed tobacco cessation medication regimen of 14 mg patch, without any negative side effects at this time. Will wean to 7 mg patches with this visit, will send refill to pharmacy. Exhaled carbon monoxide level was 12 ppm per [facility]oker[facility] (0-6 non-smoker). Pt is to f/u next week for group session.    Progress Toward Goals and Other Mental Status Changes: The patient denies any abnormal behavioral or mental changes at this time.     Diagnosis: Z72.0  Plan: The patient will continue with group therapy sessions and medication regimen prescribed with management by physician or by the Cessation Clinic Provider. Patient will inform Smoking Cessation Counselor of symptoms as rated high on TCRS.    Return to Clinic: 1 week    Quit Date: TBD   Planned Quit Date: TBD

## 2017-10-03 ENCOUNTER — CLINICAL SUPPORT (OUTPATIENT)
Dept: SMOKING CESSATION | Facility: CLINIC | Age: 57
End: 2017-10-03
Payer: COMMERCIAL

## 2017-10-03 DIAGNOSIS — F17.200 NICOTINE DEPENDENCE: ICD-10-CM

## 2017-10-03 PROCEDURE — 90853 GROUP PSYCHOTHERAPY: CPT | Mod: S$GLB,,,

## 2017-10-03 PROCEDURE — 99999 PR PBB SHADOW E&M-EST. PATIENT-LVL I: CPT | Mod: PBBFAC,,,

## 2017-10-03 NOTE — Clinical Note
Patient's Response to Intervention: pt presents for group smoking 1-3 cigarettes per day, his nicotine patch dose was dropped last week to the 7mg and the patient remains comfortable however is holding on to a few cigarettes per day, he is learning and trying to identify his triggers, once he does this he will be able to gain control, his urges and thoughts to smoke are random making it a challenge, he has changed brand of cigarettes and is buying a pack a week or longer, informed him to try and finish the pack and pick quit date this way he has no more cigarettes and he can quit, he is moving the cigarettes making it more of a challenge to get to them, he is trying to stay distracted to help wait it out, will continue to work with this patient to achieve the goal of quit.

## 2017-10-04 NOTE — PROGRESS NOTES
Site: [facility]   Date:  10/4/2017  Clinical Status of Patient: Outpatient   Length of Service and Code: 90 minutes - 85571   Number in Attendance: 3  Group Activities/Focus of Group:  orientation, client introductions, completion of TCRS (Tobacco Cessation Rating Scale) learned addiction model, cues/triggers, personal reasons for quitting, medications, goals, quit date    Target symptoms:  withdrawal and medication side effects             The following were rated moderate (3) to severe (4) on TCRS:       Moderate 3: 0     Severe 4:   0  Patient's Response to Intervention: pt presents for group smoking 1-3 cigarettes per day, his nicotine patch dose was dropped last week to the 7mg and the patient remains comfortable however is holding on to a few cigarettes per day, he is learning and trying to identify his triggers, once he does this he will be able to gain control, his urges and thoughts to smoke are random making it a challenge, he has changed brand of cigarettes and is buying a pack a week or longer, informed him to try and finish the pack and pick quit date this way he has no more cigarettes and he can quit, he is moving the cigarettes making it more of a challenge to get to them, he is trying to stay distracted to help wait it out, will continue to work with this patient to achieve the goal of quit.   Progress Toward Goals and Other Mental Status Changes: progressing towards the goal of quit with no reported mental status changes noted   Interval History: n/a    Diagnosis: Z72.0  Plan: The patient will continue with group therapy sessions and medication regimen prescribed with management by physician or Cessation Clinic Provider. Patient will inform Smoking Clinic Cessation Counselor of symptoms as rated high on TCRS.     Return to Clinic: 1 week

## 2017-10-10 ENCOUNTER — CLINICAL SUPPORT (OUTPATIENT)
Dept: SMOKING CESSATION | Facility: CLINIC | Age: 57
End: 2017-10-10
Payer: COMMERCIAL

## 2017-10-10 DIAGNOSIS — F17.200 NICOTINE DEPENDENCE: ICD-10-CM

## 2017-10-10 PROCEDURE — 90853 GROUP PSYCHOTHERAPY: CPT | Mod: S$GLB,,,

## 2017-10-10 PROCEDURE — 99999 PR PBB SHADOW E&M-EST. PATIENT-LVL I: CPT | Mod: PBBFAC,,,

## 2017-10-10 NOTE — Clinical Note
Patient's Response to Intervention: pt presents for group session smoking 1-3 cigarettes per day, he has held on to this amount for the past few weeks and is apparently not ready for a quit, discussed this with the patient and his reasons for quit and triggers, he has chosen a quit date for 10/19, recommend continuing the 7mg nicotine patch in conjunction with the 2mg oral NRT, session handout provided and discussed, will follow CO=16

## 2017-10-11 NOTE — PROGRESS NOTES
Smoking Cessation Group Session #2    Site: [facility]   Date:  10/11/2017  Clinical Status of Patient: Outpatient   Length of Service and Code: 90 minutes - 13637   Number in Attendance: 4  Group Activities/Focus of Group:  Sharing last weeks challenges, triggers, and coping activities to remain quit and/ or keep making progress toward cessation, completion of TCRS (Tobacco Cessation Rating Scale) learned addiction model, personal reasons for quitting, medications, goals, quit date.    Specific session focus: group # 2 Learning about health and tobacco, effects on the heart, respiratory systemother health concerns, changes to expect after you quit smoking cigarettes, nicotine withdrawl, why does quiting require more then just medicaito ??,how to achieve this goal, strategies to try,    Target symptoms:  withdrawal and medication side effects             The following were rated moderate (3) to severe (4) on TCRS:       Moderate 3: 0     Severe 4:   0  Patient's Response to Intervention: pt presents for group session smoking 1-3 cigarettes per day, he has held on to this amount for the past few weeks and is apparently not ready for a quit, discussed this with the patient and his reasons for quit and triggers, he has chosen a quit date for 10/19, recommend continuing the 7mg nicotine patch in conjunction with the 2mg oral NRT, session handout provided and discussed, will follow CO=16  Progress Toward Goals and Other Mental Status Changes: progressing with no mental status changes noted   Interval History: n/a    Diagnosis: Z72.0  Plan: The patient will continue with group therapy sessions and medication regimen prescribed with management by physician or by the Cessation Clinic Provider. Patient will inform Smoking Cessation Counselor of symptoms as rated high on TCRS.    Return to Clinic: 1 week    Quit Date: 10/19/17   Planned Quit Date: 10/19/17

## 2017-10-17 ENCOUNTER — CLINICAL SUPPORT (OUTPATIENT)
Dept: SMOKING CESSATION | Facility: CLINIC | Age: 57
End: 2017-10-17
Payer: COMMERCIAL

## 2017-10-17 DIAGNOSIS — F17.200 NICOTINE DEPENDENCE: ICD-10-CM

## 2017-10-17 PROCEDURE — 99999 PR PBB SHADOW E&M-EST. PATIENT-LVL I: CPT | Mod: PBBFAC,,,

## 2017-10-17 PROCEDURE — 90853 GROUP PSYCHOTHERAPY: CPT | Mod: S$GLB,,,

## 2017-10-17 NOTE — Clinical Note
Patient's Response to Intervention: patient presents for group session with a planned quit date for 10/19, patient is anxious about this approaching date, he is currently smoking 3 cigarettes per day, he is currently on the 7mg nicotine patch daily, recommend he continue this dose of the nicotine patch,  Co=13, discussed triggers and what to do when his quit date comes and goes. Will continue to monitor and group setting.

## 2017-10-18 NOTE — PROGRESS NOTES
Smoking Cessation Group Session #2    Site: [facility]   Date:  10/18/2017  Clinical Status of Patient: Outpatient   Length of Service and Code: 90 minutes - 46997   Number in Attendance: 4  Group Activities/Focus of Group:  Sharing last weeks challenges, triggers, and coping activities to remain quit and/ or keep making progress toward cessation, completion of TCRS (Tobacco Cessation Rating Scale) learned addiction model, personal reasons for quitting, medications, goals, quit date.    Specific session focus: Smoking cessation group # 3    Managing habitual behavior, Reviewing progress,strategie review,identifing high risk situations with preparation, affects of caffeine and alcohol on the body after quit, understanding urges and cravings, managing emotions, negative moods and how positive thinking plays a role in quit, relaxation techniques      Target symptoms:  withdrawal and medication side effects             The following were rated moderate (3) to severe (4) on TCRS:       Moderate 3: 0     Severe 4:   0  Patient's Response to Intervention: patient presents for group session with a planned quit date for 10/19, patient is anxious about this approaching date, he is currently smoking 3 cigarettes per day, he is currently on the 7mg nicotine patch daily, recommend he continue this dose of the nicotine patch,  Co=13, discussed triggers and what to do when his quit date comes and goes. Will continue to monitor and group setting.   Progress Toward Goals and Other Mental Status Changes: progressing with no mental status changes reported or noted   Interval History: n/a    Diagnosis: Z72.0  Plan: The patient will continue with group therapy sessions and medication regimen prescribed with management by physician or by the Cessation Clinic Provider. Patient will inform Smoking Cessation Counselor of symptoms as rated high on TCRS.    Return to Clinic: 1 week    Quit Date: 10/19/17   Planned Quit Date:  10/19/17

## 2017-10-24 ENCOUNTER — CLINICAL SUPPORT (OUTPATIENT)
Dept: SMOKING CESSATION | Facility: CLINIC | Age: 57
End: 2017-10-24
Payer: COMMERCIAL

## 2017-10-24 DIAGNOSIS — F17.200 NICOTINE DEPENDENCE: ICD-10-CM

## 2017-10-24 PROCEDURE — 99999 PR PBB SHADOW E&M-EST. PATIENT-LVL I: CPT | Mod: PBBFAC,,,

## 2017-10-24 PROCEDURE — 90853 GROUP PSYCHOTHERAPY: CPT | Mod: S$GLB,,,
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
## 2017-11-07 ENCOUNTER — CLINICAL SUPPORT (OUTPATIENT)
Dept: SMOKING CESSATION | Facility: CLINIC | Age: 57
End: 2017-11-07
Payer: COMMERCIAL

## 2017-11-07 DIAGNOSIS — F17.200 NICOTINE DEPENDENCE: Primary | ICD-10-CM

## 2017-11-07 PROCEDURE — 99999 PR PBB SHADOW E&M-EST. PATIENT-LVL I: CPT | Mod: PBBFAC,,,

## 2017-11-07 PROCEDURE — 90853 GROUP PSYCHOTHERAPY: CPT | Mod: S$GLB,,,

## 2017-11-07 NOTE — Clinical Note
Patient's Response to Intervention: patient presents for group session smoking more then he has smoked in the past few weeks, he was down to 1-2 cigarettes per day, now he is back to 45 cigarettes per day, he is currently on the nicotine patch 7mg and the nicotine lozenge PRN, he is having strong urges for the nicotine and after discussion about his feelings recommend increasing the dose of the nicotine patch to the 14mg x 2 weeks, strategies discussed with this patient, will continue to monitor and follow in group session.

## 2017-11-08 RX ORDER — IBUPROFEN 200 MG
1 TABLET ORAL DAILY
Qty: 14 PATCH | Refills: 0 | Status: SHIPPED | OUTPATIENT
Start: 2017-11-08 | End: 2017-11-15 | Stop reason: SDUPTHER

## 2017-11-08 NOTE — PROGRESS NOTES
Smoking Cessation Group Session #5    Site: [facility]   Date:  11/8/2017  Clinical Status of Patient: Outpatient   Length of Service and Code: 90 minutes - 02312   Number in Attendance: 4  Group Activities/Focus of Group:  Sharing last weeks challenges, triggers, and coping activities to remain quit and/ or keep making progress toward cessation, completion of TCRS (Tobacco Cessation Rating Scale) learned addiction model, personal reasons for quitting, medications, goals, quit date.    Specific session focus: Smoking cessation group # 5    Target Symptoms: lapses and relapses, assessing the situation, ways to cope with a slip,   The following were rated moderate (3) to severe (4) on TCRS:   Moderate (3): none  Severe (4): none  Patients Response to intervention: the patient is smoking 3 per day of cigarettes, discussed quit date, pt remains on the prescribed tobacco cessation medication regimen of 1mg of chantix po bid, 14mg nicoderm CQ patch daily, without any negative s/e at this time.    Target symptoms:  withdrawal and medication side effects             The following were rated moderate (3) to severe (4) on TCRS:       Moderate 3: 0     Severe 4:   0  Patient's Response to Intervention: patient presents for group session smoking more then he has smoked in the past few weeks, he was down to 1-2 cigarettes per day, now he is back to 45 cigarettes per day, he is currently on the nicotine patch 7mg and the nicotine lozenge PRN, he is having strong urges for the nicotine and after discussion about his feelings recommend increasing the dose of the nicotine patch to the 14mg x 2 weeks, strategies discussed with this patient, will continue to monitor and follow in group session.   Progress Toward Goals and Other Mental Status Changes: progressing with no report of mental status changes noted or reported   Interval History: n/a    Diagnosis: Z72.0  Plan: The patient will continue with group therapy sessions and  medication regimen prescribed with management by physician or by the Cessation Clinic Provider. Patient will inform Smoking Cessation Counselor of symptoms as rated high on TCRS.    Return to Clinic: 1 week    Quit Date: n/a   Planned Quit Date: n/a

## 2017-11-10 DIAGNOSIS — R55 VASOVAGAL SYNCOPE: ICD-10-CM

## 2017-11-10 RX ORDER — BETAXOLOL 10 MG/1
10 TABLET, FILM COATED ORAL DAILY
Qty: 90 TABLET | Refills: 3 | Status: SHIPPED | OUTPATIENT
Start: 2017-11-10 | End: 2017-11-15 | Stop reason: SDUPTHER

## 2017-11-14 ENCOUNTER — CLINICAL SUPPORT (OUTPATIENT)
Dept: SMOKING CESSATION | Facility: CLINIC | Age: 57
End: 2017-11-14
Payer: COMMERCIAL

## 2017-11-14 DIAGNOSIS — F17.200 NICOTINE DEPENDENCE: ICD-10-CM

## 2017-11-14 PROCEDURE — 90853 GROUP PSYCHOTHERAPY: CPT | Mod: S$GLB,,,

## 2017-11-14 PROCEDURE — 99999 PR PBB SHADOW E&M-EST. PATIENT-LVL I: CPT | Mod: PBBFAC,,,

## 2017-11-14 NOTE — Clinical Note
Patient's Response to Intervention: patient presents for group smoking 2 cigarettes per day and non- in the last two days, it was recently advised that he increase his dose of the nicotine patch to the 14mg to help him further cut back due to strong cravings - the patient was smoking 5 per day on the lower dose nicotine patch, his cravings are not slight, recommend he continue on this dose for two weeks until patient is comfortable dropping the dose, session handout provided and discussed, will continue to monitor and follow

## 2017-11-15 ENCOUNTER — HOSPITAL ENCOUNTER (OUTPATIENT)
Dept: CARDIOLOGY | Facility: CLINIC | Age: 57
Discharge: HOME | End: 2017-11-15
Payer: COMMERCIAL

## 2017-11-15 ENCOUNTER — CLINICAL SUPPORT (OUTPATIENT)
Dept: ELECTROPHYSIOLOGY | Facility: CLINIC | Age: 57
End: 2017-11-15
Payer: COMMERCIAL

## 2017-11-15 ENCOUNTER — OFFICE VISIT (OUTPATIENT)
Dept: ELECTROPHYSIOLOGY | Facility: CLINIC | Age: 57
End: 2017-11-15
Payer: COMMERCIAL

## 2017-11-15 VITALS
SYSTOLIC BLOOD PRESSURE: 146 MMHG | DIASTOLIC BLOOD PRESSURE: 88 MMHG | BODY MASS INDEX: 43.43 KG/M2 | HEIGHT: 69 IN | HEART RATE: 49 BPM | WEIGHT: 293.19 LBS

## 2017-11-15 DIAGNOSIS — R55 VASOVAGAL SYNCOPE: ICD-10-CM

## 2017-11-15 DIAGNOSIS — R55 SYNCOPE, UNSPECIFIED SYNCOPE TYPE: ICD-10-CM

## 2017-11-15 DIAGNOSIS — R55 VASOVAGAL SYNCOPE: Chronic | ICD-10-CM

## 2017-11-15 DIAGNOSIS — Z95.9 CARDIAC DEVICE IN SITU: ICD-10-CM

## 2017-11-15 DIAGNOSIS — R00.1 BRADYCARDIA: Primary | Chronic | ICD-10-CM

## 2017-11-15 DIAGNOSIS — E66.01 MORBID OBESITY WITH BMI OF 40.0-44.9, ADULT: Chronic | ICD-10-CM

## 2017-11-15 DIAGNOSIS — Z95.9 CARDIAC DEVICE IN SITU: Primary | ICD-10-CM

## 2017-11-15 LAB
ESTIMATED PA SYSTOLIC PRESSURE: 34.58
MITRAL VALVE MOBILITY: NORMAL
RETIRED EF AND QEF - SEE NOTES: 55 (ref 55–65)
TRICUSPID VALVE REGURGITATION: NORMAL

## 2017-11-15 PROCEDURE — 93000 ELECTROCARDIOGRAM COMPLETE: CPT | Mod: S$GLB,,, | Performed by: INTERNAL MEDICINE

## 2017-11-15 PROCEDURE — 96374 THER/PROPH/DIAG INJ IV PUSH: CPT | Mod: S$GLB,,, | Performed by: INTERNAL MEDICINE

## 2017-11-15 PROCEDURE — 93306 TTE W/DOPPLER COMPLETE: CPT | Mod: S$GLB,,, | Performed by: INTERNAL MEDICINE

## 2017-11-15 PROCEDURE — 93291 INTERROG DEV EVAL SCRMS IP: CPT | Mod: S$GLB,,, | Performed by: INTERNAL MEDICINE

## 2017-11-15 PROCEDURE — 99999 PR PBB SHADOW E&M-EST. PATIENT-LVL III: CPT | Mod: PBBFAC,,, | Performed by: INTERNAL MEDICINE

## 2017-11-15 PROCEDURE — 99214 OFFICE O/P EST MOD 30 MIN: CPT | Mod: S$GLB,,, | Performed by: INTERNAL MEDICINE

## 2017-11-15 RX ORDER — CHLORTHALIDONE 25 MG/1
25 TABLET ORAL DAILY
Qty: 90 TABLET | Refills: 3 | Status: SHIPPED | OUTPATIENT
Start: 2017-11-15 | End: 2018-11-28 | Stop reason: SDUPTHER

## 2017-11-15 RX ORDER — IBUPROFEN 200 MG
1 TABLET ORAL DAILY
Qty: 14 PATCH | Refills: 0 | Status: SHIPPED | OUTPATIENT
Start: 2017-11-15 | End: 2017-12-15

## 2017-11-15 RX ORDER — BETAXOLOL 10 MG/1
TABLET, FILM COATED ORAL
Qty: 90 TABLET | Refills: 3 | Status: SHIPPED | OUTPATIENT
Start: 2017-11-15 | End: 2021-02-17
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
## 2017-11-15 NOTE — PROGRESS NOTES
Consent obtained. 22 g sl started in right hand for optison use. optison given ivp via sl for  Imaging. Denies transfusion rxn. Tolerated well. Sl d/elias after, pressure applied.

## 2017-11-15 NOTE — PROGRESS NOTES
Smoking Cessation Group Session #6    Site: [facility]   Date:  11/15/2017  Clinical Status of Patient: Outpatient   Length of Service and Code: 90 minutes - 63658   Number in Attendance: 5  Group Activities/Focus of Group:  Sharing last weeks challenges, triggers, and coping activities to remain quit and/ or keep making progress toward cessation, completion of TCRS (Tobacco Cessation Rating Scale) learned addiction model, personal reasons for quitting, medications, goals, quit date.    Specific session focus:   discussed strategies to help cut back and to help break the routine and habit associated with smoking, intake handout provided to the patient and discussed, all prescribed NRT discussed in depth as well as tobacco cessation medication s/e as well as usage discussed, contact card provided to the patient. Will continue to follow every two weeks while in the program.     Target symptoms:  withdrawal and medication side effects             The following were rated moderate (3) to severe (4) on TCRS:       Moderate 3: 0     Severe 4:   0  Patient's Response to Intervention: patient presents for group smoking 2 cigarettes per day and non- in the last two days, it was recently advised that he increase his dose of the nicotine patch to the 14mg to help him further cut back due to strong cravings - the patient was smoking 5 per day on the lower dose nicotine patch, his cravings are not slight, recommend he continue on this dose for two weeks until patient is comfortable dropping the dose, session handout provided and discussed, will continue to monitor and follow   Progress Toward Goals and Other Mental Status Changes: progressing with no mental status changes noted   Interval History: n/a    Diagnosis: Z72.0  Plan: The patient will continue with group therapy sessions and medication regimen prescribed with management by physician or by the Cessation Clinic Provider. Patient will inform Smoking  Cessation Counselor of symptoms as rated high on TCRS.    Return to Clinic: 2 weeks    Quit Date: n/a   Planned Quit Date: n/a

## 2018-03-27 ENCOUNTER — CLINICAL SUPPORT (OUTPATIENT)
Dept: ELECTROPHYSIOLOGY | Facility: CLINIC | Age: 58
End: 2018-03-27
Attending: INTERNAL MEDICINE
Payer: COMMERCIAL

## 2018-03-27 DIAGNOSIS — R55 SYNCOPE, UNSPECIFIED SYNCOPE TYPE: ICD-10-CM

## 2018-03-27 DIAGNOSIS — Z95.9 CARDIAC DEVICE IN SITU: ICD-10-CM

## 2018-03-27 PROCEDURE — 93291 INTERROG DEV EVAL SCRMS IP: CPT | Mod: S$GLB,,, | Performed by: INTERNAL MEDICINE

## 2018-06-12 ENCOUNTER — HOSPITAL ENCOUNTER (OUTPATIENT)
Dept: RADIOLOGY | Facility: HOSPITAL | Age: 58
Discharge: HOME | End: 2018-06-12
Attending: ORTHOPAEDIC SURGERY
Payer: COMMERCIAL

## 2018-06-12 ENCOUNTER — OFFICE VISIT (OUTPATIENT)
Dept: SPORTS MEDICINE | Facility: CLINIC | Age: 58
End: 2018-06-12
Payer: COMMERCIAL

## 2018-06-12 VITALS
SYSTOLIC BLOOD PRESSURE: 147 MMHG | DIASTOLIC BLOOD PRESSURE: 83 MMHG | HEART RATE: 71 BPM | HEIGHT: 69 IN | BODY MASS INDEX: 43.4 KG/M2 | WEIGHT: 293 LBS

## 2018-06-12 DIAGNOSIS — M25.562 LEFT KNEE PAIN, UNSPECIFIED CHRONICITY: Primary | ICD-10-CM

## 2018-06-12 DIAGNOSIS — M25.562 LEFT KNEE PAIN, UNSPECIFIED CHRONICITY: ICD-10-CM

## 2018-06-12 PROCEDURE — 73564 X-RAY EXAM KNEE 4 OR MORE: CPT | Mod: TC,50,FY,PO

## 2018-06-12 PROCEDURE — 99999 PR PBB SHADOW E&M-EST. PATIENT-LVL III: CPT | Mod: PBBFAC,,, | Performed by: ORTHOPAEDIC SURGERY

## 2018-06-12 PROCEDURE — 3008F BODY MASS INDEX DOCD: CPT | Mod: CPTII,S$GLB,, | Performed by: ORTHOPAEDIC SURGERY

## 2018-06-12 PROCEDURE — 73564 X-RAY EXAM KNEE 4 OR MORE: CPT | Mod: 26,50,, | Performed by: RADIOLOGY

## 2018-06-12 PROCEDURE — 3079F DIAST BP 80-89 MM HG: CPT | Mod: CPTII,S$GLB,, | Performed by: ORTHOPAEDIC SURGERY

## 2018-06-12 PROCEDURE — 99203 OFFICE O/P NEW LOW 30 MIN: CPT | Mod: S$GLB,,, | Performed by: ORTHOPAEDIC SURGERY

## 2018-06-12 PROCEDURE — 3077F SYST BP >= 140 MM HG: CPT | Mod: CPTII,S$GLB,, | Performed by: ORTHOPAEDIC SURGERY

## 2018-06-12 NOTE — PROGRESS NOTES
CC: Left knee pain x years    57 y.o. Male with a history of left knee pain who presents for initial evaluation. He was a  previously and had to stop from his knee pain. He previously saw Dr Maldonado who is getting ready to retire. Here for a second opinion. Has previous cortisone and viscosupplementation. Last injection to left knee was over 5 or 6 years but didn't help.  He states that the pain is severe and not responding to any conservative care.  He tried PT which didn't help. Has tried NSAIDs without benefit. He has pain on the inside and outside of his knee as well as posteriorly. Pain going down the stairs.    He reports that the pain and weakness. It also bothers him at night.    positive mechanical symptoms, pos instability    Is affecting ADLs.  Pain is 10/10 at it's worst.    REVIEW OF SYSTEMS:  Constitution: Negative. Negative for chills, fever and night sweats.   HENT: Negative for congestion and headaches.    Eyes: Negative for blurred vision, left vision loss and right vision loss.   Cardiovascular: Negative for chest pain and syncope.   Respiratory: Negative for cough and shortness of breath.    Endocrine: Negative for polydipsia, polyphagia and polyuria.   Hematologic/Lymphatic: Negative for bleeding problem. Does not bruise/bleed easily.   Skin: Negative for dry skin, itching and rash.   Musculoskeletal: Negative for falls. Positive for left knee pain and  muscle weakness.   Gastrointestinal: Negative for abdominal pain and bowel incontinence.   Genitourinary: Negative for bladder incontinence and nocturia.   Neurological: Negative for disturbances in coordination, loss of balance and seizures.   Psychiatric/Behavioral: Negative for depression. The patient does not have insomnia.    Allergic/Immunologic: Negative for hives and persistent infections.     PAST MEDICAL HISTORY:    Past Medical History:   Diagnosis Date    Anxiety     History of psychiatric care     Hypertension      Major depression, single episode 9/13/2013    AUREA (obstructive sleep apnea)     Primary osteoarthritis of left knee 7/18/2016    Psychiatric problem     Stroke     Syncope and collapse        PAST SURGICAL HISTORY:   Past Surgical History:   Procedure Laterality Date    knee scope         FAMILY HISTORY:   Family History   Problem Relation Age of Onset    Hypertension Mother     Diabetes Mother     Heart disease Mother     Heart disease Father     Cataracts Father     No Known Problems Sister     No Known Problems Brother     No Known Problems Maternal Aunt     No Known Problems Maternal Uncle     No Known Problems Paternal Aunt     No Known Problems Paternal Uncle     No Known Problems Maternal Grandmother     No Known Problems Maternal Grandfather     No Known Problems Paternal Grandmother     No Known Problems Paternal Grandfather     Amblyopia Neg Hx     Blindness Neg Hx     Cancer Neg Hx     Glaucoma Neg Hx     Macular degeneration Neg Hx     Retinal detachment Neg Hx     Strabismus Neg Hx     Stroke Neg Hx     Thyroid disease Neg Hx        SOCIAL HISTORY:   Social History     Social History    Marital status:      Spouse name: N/A    Number of children: N/A    Years of education: N/A     Occupational History    Not on file.     Social History Main Topics    Smoking status: Current Some Day Smoker     Packs/day: 0.75     Last attempt to quit: 8/29/2016    Smokeless tobacco: Never Used    Alcohol use Yes    Drug use: No    Sexual activity: Not on file     Other Topics Concern    Not on file     Social History Narrative    No narrative on file       MEDICATIONS:     Current Outpatient Prescriptions:     albuterol sulfate 90 mcg/actuation AePB, Inhale 1 puff into the lungs every 4 (four) hours as needed. Rescue, Disp: 1 each, Rfl: 1    atorvastatin (LIPITOR) 20 MG tablet, Take 1 tablet (20 mg total) by mouth once daily., Disp: 90 tablet, Rfl: 3    betaxolol  "(KERLONE) 10 mg Tab, Take 1/2 tab (5 mg) PO qd., Disp: 90 tablet, Rfl: 3    chlorthalidone (HYGROTEN) 25 MG Tab, Take 1 tablet (25 mg total) by mouth once daily., Disp: 90 tablet, Rfl: 3    aspirin (ECOTRIN) 81 MG EC tablet, Take 1 tablet (81 mg total) by mouth once daily., Disp: 90 tablet, Rfl: 1    ALLERGIES:   Review of patient's allergies indicates:   Allergen Reactions    Pcn [penicillins]        VITAL SIGNS:   BP (!) 147/83   Pulse 71   Ht 5' 9" (1.753 m)   Wt 132.9 kg (293 lb)   BMI 43.27 kg/m²      PHYSICAL EXAMINATION  General:  The patient is alert and oriented x 3.  Mood is pleasant.  Observation of ears, eyes and nose reveal no gross abnormalities.  No labored breathing observed.    LEFT KNEE EXAMINATION     OBSERVATION / INSPECTION   Gait:   antalgic   Alignment:  Neutral   Scars:   None   Muscle atrophy: Mild  Effusion:  None   Warmth:  None   Discoloration:   none     TENDERNESS / CREPITUS (T / C):          T / C      T / C   Patella   - / -   Lateral joint line   - / -   Peripatellar medial  -  Medial joint line    + / -   Peripatellar lateral -  Medial plica   - / -   Patellar tendon -   Popliteal fossa  - / -   Quad tendon   -   Gastrocnemius   -   Prepatellar Bursa - / -   Quadricep   -   Tibial tubercle  -  Thigh/hamstring  -   Pes anserine/HS -  Fibula    -   ITB   - / -  Tibia     -   Tib/fib joint  - / -  LCL    -     MFC   - / -   MCL: Proximal  -    LFC   - / -    Distal   -          ROM: (* = pain)  PASSIVE   ACTIVE    Left :   5 / 0 / 130   5 / 0 / 130     Right :    5 / 0 / 130   5 / 0 / 130    Patellofemoral examination:  See above noted areas of tenderness.   Patella position    Subluxation / dislocation: Centered           Sup. / Inf;   Normal   Crepitus (PF):    Absent   Patellar Mobility:       Medial-lateral:   Normal    Superior-inferior:  Normal    Inferior tilt   Normal    Patellar tendon:  Normal   Lateral tilt:    Normal   J-sign:     None   Patellofemoral grind:   No " pain       MENISCAL SIGNS:     Pain on terminal extension:  -  Pain on terminal flexion:  -  Gustavos maneuver:  + (for medial)  Squat     - (for medial)    LIGAMENT EXAMINATION:  ACL / Lachman:  normal (-1 to 2mm)    PCL-Post.  drawer: normal 0 to 2mm  MCL- Valgus:  normal 0 to 2mm  LCL- Varus:  normal 0 to 2mm  Pivot shift: normal (Equal)   Dial Test: difference c/w other side   At 30° flexion: normal (< 5°)    At 90° flexion: normal (< 5°)   Reverse Pivot Shift:   normal (Equal)     STRENGTH: (* = with pain) PAINFUL SIDE   Quadricep   5/5   Hamstrin/5    EXTREMITY NEURO-VASCULAR EXAMINATION:   Sensation:  Grossly intact to light touch all dermatomal regions.   Motor Function:  Fully intact motor function at hip, knee, foot and ankle    DTRs;  quadriceps and  achilles 2+.  No clonus and downgoing Babinski.    Vascular status:  DP and PT pulses 2+, brisk capillary refill, symmetric.     OTHER FINDINGS:  Soft tissue swelling    IMAGING:     X-rays including standing, weight bearing AP and flexion bilateral knees, lateral and merchant views ordered and images reviewed by me show:   Left knee medial joint space bone on bone DJD  Mild patellofemoral djd  Right knee without significant osseous abnormality, mild varus        ASSESSMENT:    Left Knee  Pain, DJD     PLAN:   1. Medial  brace  2. Potential partial vs total candidate  3. Discussed weight loss of about 50 lbs    All questions were answered, pt will contact us for questions or concerns in the interim.

## 2018-06-13 ENCOUNTER — CLINICAL SUPPORT (OUTPATIENT)
Dept: SMOKING CESSATION | Facility: CLINIC | Age: 58
End: 2018-06-13
Payer: COMMERCIAL

## 2018-06-13 DIAGNOSIS — F17.200 NICOTINE DEPENDENCE: Primary | ICD-10-CM

## 2018-06-13 PROCEDURE — 99407 BEHAV CHNG SMOKING > 10 MIN: CPT | Mod: S$GLB,,,

## 2018-06-13 NOTE — PROGRESS NOTES
Spoke with patient today in regards to smoking cessation progress 3/6 month phone follow up, states not tobacco free at this time. Patient is not interested in returning to the smoking cessation program at this time. Will call when ready to schedule an appointment. Informed patient of benefit period, phone follow up at 1 year, and contact information. Will complete smart form for 3/6 month phone follow up on Quit attempt #2.

## 2018-07-24 ENCOUNTER — CLINICAL SUPPORT (OUTPATIENT)
Dept: SMOKING CESSATION | Facility: CLINIC | Age: 58
End: 2018-07-24
Payer: COMMERCIAL

## 2018-07-24 DIAGNOSIS — F17.200 NICOTINE DEPENDENCE: Primary | ICD-10-CM

## 2018-07-24 PROCEDURE — 99407 BEHAV CHNG SMOKING > 10 MIN: CPT | Mod: S$GLB,,,

## 2018-07-24 NOTE — PROGRESS NOTES
Spoke with patient today in regard to smoking cessation progress 12 month phone follow up, states not tobacco free. Patient is not interested in returning to the smoking cessation program at this time. Will call when ready to schedule. Informed patient of benefit period,  and contact information. Will complete and resolve smart form for  month phone follow up on Quit attempt #2.

## 2018-07-26 DIAGNOSIS — R55 VASOVAGAL SYNCOPE: ICD-10-CM

## 2018-07-26 DIAGNOSIS — Z95.9 PRESENCE OF CARDIAC AND VASCULAR IMPLANT AND GRAFT: Primary | ICD-10-CM

## 2018-07-27 ENCOUNTER — CLINICAL SUPPORT (OUTPATIENT)
Dept: ELECTROPHYSIOLOGY | Facility: CLINIC | Age: 58
End: 2018-07-27
Payer: COMMERCIAL

## 2018-07-27 DIAGNOSIS — Z95.9 PRESENCE OF CARDIAC AND VASCULAR IMPLANT AND GRAFT: ICD-10-CM

## 2018-07-27 DIAGNOSIS — R55 VASOVAGAL SYNCOPE: ICD-10-CM

## 2018-07-27 PROCEDURE — 93291 INTERROG DEV EVAL SCRMS IP: CPT | Mod: S$GLB,,, | Performed by: INTERNAL MEDICINE

## 2018-10-05 ENCOUNTER — CLINICAL SUPPORT (OUTPATIENT)
Dept: URGENT CARE | Facility: CLINIC | Age: 58
End: 2018-10-05
Payer: COMMERCIAL

## 2018-10-05 VITALS — TEMPERATURE: 98 F

## 2018-10-05 DIAGNOSIS — Z23 FLU VACCINE NEED: Primary | ICD-10-CM

## 2018-10-05 PROCEDURE — 90686 IIV4 VACC NO PRSV 0.5 ML IM: CPT | Mod: S$GLB,,, | Performed by: EMERGENCY MEDICINE

## 2018-10-05 PROCEDURE — 90471 IMMUNIZATION ADMIN: CPT | Mod: S$GLB,,, | Performed by: EMERGENCY MEDICINE

## 2018-10-22 DIAGNOSIS — Z95.9 CARDIAC DEVICE IN SITU: ICD-10-CM

## 2018-10-22 DIAGNOSIS — I49.5 SINUS BRADY-TACHY SYNDROME: Primary | ICD-10-CM

## 2018-10-25 DIAGNOSIS — E78.00 PURE HYPERCHOLESTEROLEMIA: ICD-10-CM

## 2018-10-25 RX ORDER — ATORVASTATIN CALCIUM 20 MG/1
20 TABLET, FILM COATED ORAL DAILY
Qty: 90 TABLET | Refills: 0 | Status: SHIPPED | OUTPATIENT
Start: 2018-10-25 | End: 2019-01-17 | Stop reason: SDUPTHER

## 2018-11-06 ENCOUNTER — OFFICE VISIT (OUTPATIENT)
Dept: OPTOMETRY | Facility: CLINIC | Age: 58
End: 2018-11-06
Payer: COMMERCIAL

## 2018-11-06 DIAGNOSIS — H52.4 HYPEROPIA OF BOTH EYES WITH ASTIGMATISM AND PRESBYOPIA: Primary | ICD-10-CM

## 2018-11-06 DIAGNOSIS — H52.03 HYPEROPIA OF BOTH EYES WITH ASTIGMATISM AND PRESBYOPIA: Primary | ICD-10-CM

## 2018-11-06 DIAGNOSIS — H52.203 HYPEROPIA OF BOTH EYES WITH ASTIGMATISM AND PRESBYOPIA: Primary | ICD-10-CM

## 2018-11-06 PROCEDURE — 92004 COMPRE OPH EXAM NEW PT 1/>: CPT | Mod: S$GLB,,, | Performed by: OPTOMETRIST

## 2018-11-06 PROCEDURE — 92015 DETERMINE REFRACTIVE STATE: CPT | Mod: S$GLB,,, | Performed by: OPTOMETRIST

## 2018-11-06 PROCEDURE — 99999 PR PBB SHADOW E&M-EST. PATIENT-LVL II: CPT | Mod: PBBFAC,,, | Performed by: OPTOMETRIST

## 2018-11-06 NOTE — PROGRESS NOTES
HPI     Eyemed Annual exam  No va issues  Occasional floaters OU,unchanged  Denies flashes/pain/irritation   Pt is not using any drops       Last edited by Hilario Jacques, OD on 11/6/2018 10:54 AM. (History)            Assessment /Plan     For exam results, see Encounter Report.    Hyperopia of both eyes with astigmatism and presbyopia  Eyeglass Final Rx     Eyeglass Final Rx       Sphere Cylinder Axis Dist VA Add    Right +2.25 +0.75 180 20/20 +2.00    Left +1.75 +1.25 180 20/20 +2.00    Type:  PAL    Expiration Date:  11/7/2019                  RTC 1 yr

## 2018-11-28 ENCOUNTER — TELEPHONE (OUTPATIENT)
Dept: ELECTROPHYSIOLOGY | Facility: CLINIC | Age: 58
End: 2018-11-28

## 2018-11-28 ENCOUNTER — CLINICAL SUPPORT (OUTPATIENT)
Dept: CARDIOLOGY | Facility: HOSPITAL | Age: 58
End: 2018-11-28
Attending: INTERNAL MEDICINE
Payer: COMMERCIAL

## 2018-11-28 ENCOUNTER — HOSPITAL ENCOUNTER (OUTPATIENT)
Dept: CARDIOLOGY | Facility: CLINIC | Age: 58
Discharge: HOME | End: 2018-11-28
Payer: COMMERCIAL

## 2018-11-28 ENCOUNTER — OFFICE VISIT (OUTPATIENT)
Dept: ELECTROPHYSIOLOGY | Facility: CLINIC | Age: 58
End: 2018-11-28
Payer: COMMERCIAL

## 2018-11-28 VITALS
BODY MASS INDEX: 41.95 KG/M2 | HEIGHT: 70 IN | SYSTOLIC BLOOD PRESSURE: 142 MMHG | DIASTOLIC BLOOD PRESSURE: 86 MMHG | HEART RATE: 60 BPM | WEIGHT: 293 LBS

## 2018-11-28 VITALS
HEIGHT: 70 IN | BODY MASS INDEX: 41.95 KG/M2 | WEIGHT: 293 LBS | HEART RATE: 65 BPM | DIASTOLIC BLOOD PRESSURE: 86 MMHG | SYSTOLIC BLOOD PRESSURE: 142 MMHG

## 2018-11-28 DIAGNOSIS — I49.5 SINUS BRADY-TACHY SYNDROME: ICD-10-CM

## 2018-11-28 DIAGNOSIS — R00.1 BRADYCARDIA: Primary | ICD-10-CM

## 2018-11-28 DIAGNOSIS — E66.01 MORBID OBESITY WITH BMI OF 40.0-44.9, ADULT: Chronic | ICD-10-CM

## 2018-11-28 DIAGNOSIS — R55 VASOVAGAL SYNCOPE: ICD-10-CM

## 2018-11-28 DIAGNOSIS — R00.1 BRADYCARDIA: ICD-10-CM

## 2018-11-28 DIAGNOSIS — Z95.9 CARDIAC DEVICE IN SITU: ICD-10-CM

## 2018-11-28 DIAGNOSIS — R00.1 BRADYCARDIA: Primary | Chronic | ICD-10-CM

## 2018-11-28 DIAGNOSIS — E78.00 PURE HYPERCHOLESTEROLEMIA: ICD-10-CM

## 2018-11-28 DIAGNOSIS — R55 VASOVAGAL SYNCOPE: Chronic | ICD-10-CM

## 2018-11-28 LAB
ASCENDING AORTA: 2.86 CM
AV INDEX (PROSTH): 0.81
AV MEAN GRADIENT: 3.67 MMHG
AV PEAK GRADIENT: 7.18 MMHG
AV VALVE AREA: 3.62 CM2
BSA FOR ECHO PROCEDURE: 2.56 M2
CV ECHO LV RWT: 0.44 CM
DOP CALC AO PEAK VEL: 1.34 M/S
DOP CALC AO VTI: 24.93 CM
DOP CALC LVOT AREA: 4.48 CM2
DOP CALC LVOT DIAMETER: 2.39 CM
DOP CALC LVOT STROKE VOLUME: 90.35 CM3
DOP CALCLVOT PEAK VEL VTI: 20.15 CM
E WAVE DECELERATION TIME: 214.69 MSEC
E/A RATIO: 1.11
E/E' RATIO: 9
ECHO LV POSTERIOR WALL: 1.07 CM (ref 0.6–1.1)
FRACTIONAL SHORTENING: 31 % (ref 28–44)
INTERVENTRICULAR SEPTUM: 1.08 CM (ref 0.6–1.1)
LA MAJOR: 6.11 CM
LA MINOR: 6.06 CM
LA WIDTH: 4.5 CM
LEFT ATRIUM SIZE: 4.38 CM
LEFT ATRIUM VOLUME INDEX: 39.8 ML/M2
LEFT ATRIUM VOLUME: 101.94 CM3
LEFT INTERNAL DIMENSION IN SYSTOLE: 3.38 CM (ref 2.1–4)
LEFT VENTRICLE DIASTOLIC VOLUME INDEX: 44.33 ML/M2
LEFT VENTRICLE DIASTOLIC VOLUME: 113.48 ML
LEFT VENTRICLE MASS INDEX: 76.1 G/M2
LEFT VENTRICLE SYSTOLIC VOLUME INDEX: 18.3 ML/M2
LEFT VENTRICLE SYSTOLIC VOLUME: 46.82 ML
LEFT VENTRICULAR INTERNAL DIMENSION IN DIASTOLE: 4.91 CM (ref 3.5–6)
LEFT VENTRICULAR MASS: 194.89 G
LV LATERAL E/E' RATIO: 7.2
LV SEPTAL E/E' RATIO: 12
MV PEAK A VEL: 0.65 M/S
MV PEAK E VEL: 0.72 M/S
PISA TR MAX VEL: 1.73 M/S
PULM VEIN S/D RATIO: 1.05
PV PEAK D VEL: 0.55 M/S
PV PEAK S VEL: 0.58 M/S
RA MAJOR: 5.08 CM
RA PRESSURE: 3 MMHG
RA WIDTH: 5.09 CM
RIGHT VENTRICULAR END-DIASTOLIC DIMENSION: 4.29 CM
RV TISSUE DOPPLER FREE WALL SYSTOLIC VELOCITY 1 (APICAL 4 CHAMBER VIEW): 19.45 M/S
SINUS: 3.26 CM
STJ: 2.56 CM
TDI LATERAL: 0.1
TDI SEPTAL: 0.06
TDI: 0.08
TR MAX PG: 11.97 MMHG
TRICUSPID ANNULAR PLANE SYSTOLIC EXCURSION: 2.67 CM
TV REST PULMONARY ARTERY PRESSURE: 14.97 MMHG

## 2018-11-28 PROCEDURE — 93291 INTERROG DEV EVAL SCRMS IP: CPT

## 2018-11-28 PROCEDURE — 3077F SYST BP >= 140 MM HG: CPT | Mod: CPTII,S$GLB,, | Performed by: INTERNAL MEDICINE

## 2018-11-28 PROCEDURE — 3008F BODY MASS INDEX DOCD: CPT | Mod: CPTII,S$GLB,, | Performed by: INTERNAL MEDICINE

## 2018-11-28 PROCEDURE — 93306 TTE W/DOPPLER COMPLETE: CPT | Mod: S$GLB,,, | Performed by: INTERNAL MEDICINE

## 2018-11-28 PROCEDURE — 3079F DIAST BP 80-89 MM HG: CPT | Mod: CPTII,S$GLB,, | Performed by: INTERNAL MEDICINE

## 2018-11-28 PROCEDURE — 99214 OFFICE O/P EST MOD 30 MIN: CPT | Mod: S$GLB,,, | Performed by: INTERNAL MEDICINE

## 2018-11-28 PROCEDURE — 93005 ELECTROCARDIOGRAM TRACING: CPT | Mod: S$GLB,,, | Performed by: INTERNAL MEDICINE

## 2018-11-28 PROCEDURE — 93010 ELECTROCARDIOGRAM REPORT: CPT | Mod: S$GLB,,, | Performed by: INTERNAL MEDICINE

## 2018-11-28 PROCEDURE — 93291 INTERROG DEV EVAL SCRMS IP: CPT | Mod: 26,,, | Performed by: INTERNAL MEDICINE

## 2018-11-28 PROCEDURE — 99999 PR PBB SHADOW E&M-EST. PATIENT-LVL III: CPT | Mod: PBBFAC,,, | Performed by: INTERNAL MEDICINE

## 2018-11-28 RX ORDER — AMOXICILLIN 500 MG
1 CAPSULE ORAL DAILY
COMMUNITY

## 2018-11-28 RX ORDER — CHLORTHALIDONE 50 MG/1
50 TABLET ORAL DAILY
Qty: 90 TABLET | Refills: 3 | Status: SHIPPED | OUTPATIENT
Start: 2018-11-28 | End: 2019-05-17
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
## 2018-11-29 DIAGNOSIS — Z95.818 STATUS POST PLACEMENT OF IMPLANTABLE LOOP RECORDER: Primary | ICD-10-CM

## 2018-11-29 DIAGNOSIS — R55 SYNCOPE AND COLLAPSE: ICD-10-CM

## 2018-12-13 ENCOUNTER — OFFICE VISIT (OUTPATIENT)
Dept: FAMILY MEDICINE | Facility: CLINIC | Age: 58
End: 2018-12-13
Payer: COMMERCIAL

## 2018-12-13 VITALS
DIASTOLIC BLOOD PRESSURE: 74 MMHG | HEIGHT: 70 IN | WEIGHT: 291 LBS | OXYGEN SATURATION: 94 % | SYSTOLIC BLOOD PRESSURE: 132 MMHG | TEMPERATURE: 99 F | BODY MASS INDEX: 41.66 KG/M2 | HEART RATE: 62 BPM

## 2018-12-13 DIAGNOSIS — M25.562 CHRONIC PAIN OF BOTH KNEES: ICD-10-CM

## 2018-12-13 DIAGNOSIS — E66.01 MORBID OBESITY WITH BMI OF 40.0-44.9, ADULT: ICD-10-CM

## 2018-12-13 DIAGNOSIS — F17.200 TOBACCO DEPENDENCE: ICD-10-CM

## 2018-12-13 DIAGNOSIS — M25.561 CHRONIC PAIN OF BOTH KNEES: ICD-10-CM

## 2018-12-13 DIAGNOSIS — Z12.5 SCREENING FOR PROSTATE CANCER: ICD-10-CM

## 2018-12-13 DIAGNOSIS — R55 VASOVAGAL SYNCOPE: ICD-10-CM

## 2018-12-13 DIAGNOSIS — Z00.00 ROUTINE MEDICAL EXAM: Primary | ICD-10-CM

## 2018-12-13 DIAGNOSIS — E78.00 PURE HYPERCHOLESTEROLEMIA: ICD-10-CM

## 2018-12-13 DIAGNOSIS — G89.29 CHRONIC PAIN OF BOTH KNEES: ICD-10-CM

## 2018-12-13 PROBLEM — Z12.12 SCREENING FOR COLORECTAL CANCER: Status: ACTIVE | Noted: 2018-12-13

## 2018-12-13 PROBLEM — Z12.11 SCREENING FOR COLORECTAL CANCER: Status: ACTIVE | Noted: 2018-12-13

## 2018-12-13 PROCEDURE — 99396 PREV VISIT EST AGE 40-64: CPT | Mod: S$GLB,,, | Performed by: INTERNAL MEDICINE

## 2018-12-13 PROCEDURE — 99999 PR PBB SHADOW E&M-EST. PATIENT-LVL III: CPT | Mod: PBBFAC,,, | Performed by: INTERNAL MEDICINE

## 2018-12-13 PROCEDURE — 3075F SYST BP GE 130 - 139MM HG: CPT | Mod: CPTII,S$GLB,, | Performed by: INTERNAL MEDICINE

## 2018-12-13 PROCEDURE — 3078F DIAST BP <80 MM HG: CPT | Mod: CPTII,S$GLB,, | Performed by: INTERNAL MEDICINE
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
## 2018-12-14 ENCOUNTER — LAB VISIT (OUTPATIENT)
Dept: LAB | Facility: HOSPITAL | Age: 58
End: 2018-12-14
Attending: INTERNAL MEDICINE
Payer: COMMERCIAL

## 2018-12-14 DIAGNOSIS — Z00.00 ROUTINE MEDICAL EXAM: ICD-10-CM

## 2018-12-14 DIAGNOSIS — Z12.5 SCREENING FOR PROSTATE CANCER: ICD-10-CM

## 2018-12-14 LAB
ALBUMIN SERPL BCP-MCNC: 3.9 G/DL
ALP SERPL-CCNC: 80 U/L
ALT SERPL W/O P-5'-P-CCNC: 37 U/L
ANION GAP SERPL CALC-SCNC: 7 MMOL/L
AST SERPL-CCNC: 26 U/L
BASOPHILS # BLD AUTO: 0.05 K/UL
BASOPHILS NFR BLD: 0.5 %
BILIRUB SERPL-MCNC: 0.9 MG/DL
BUN SERPL-MCNC: 11 MG/DL
CALCIUM SERPL-MCNC: 9.6 MG/DL
CHLORIDE SERPL-SCNC: 102 MMOL/L
CHOLEST SERPL-MCNC: 166 MG/DL
CHOLEST/HDLC SERPL: 3.1 {RATIO}
CO2 SERPL-SCNC: 29 MMOL/L
COMPLEXED PSA SERPL-MCNC: 0.34 NG/ML
CREAT SERPL-MCNC: 0.9 MG/DL
DIFFERENTIAL METHOD: NORMAL
EOSINOPHIL # BLD AUTO: 0.5 K/UL
EOSINOPHIL NFR BLD: 5 %
ERYTHROCYTE [DISTWIDTH] IN BLOOD BY AUTOMATED COUNT: 13.2 %
EST. GFR  (AFRICAN AMERICAN): >60 ML/MIN/1.73 M^2
EST. GFR  (NON AFRICAN AMERICAN): >60 ML/MIN/1.73 M^2
GLUCOSE SERPL-MCNC: 112 MG/DL
HCT VFR BLD AUTO: 45.4 %
HDLC SERPL-MCNC: 53 MG/DL
HDLC SERPL: 31.9 %
HGB BLD-MCNC: 14.9 G/DL
IMM GRANULOCYTES # BLD AUTO: 0.04 K/UL
IMM GRANULOCYTES NFR BLD AUTO: 0.4 %
LDLC SERPL CALC-MCNC: 93.6 MG/DL
LYMPHOCYTES # BLD AUTO: 2.4 K/UL
LYMPHOCYTES NFR BLD: 25.1 %
MCH RBC QN AUTO: 30.2 PG
MCHC RBC AUTO-ENTMCNC: 32.8 G/DL
MCV RBC AUTO: 92 FL
MONOCYTES # BLD AUTO: 1 K/UL
MONOCYTES NFR BLD: 10.5 %
NEUTROPHILS # BLD AUTO: 5.5 K/UL
NEUTROPHILS NFR BLD: 58.5 %
NONHDLC SERPL-MCNC: 113 MG/DL
NRBC BLD-RTO: 0 /100 WBC
PLATELET # BLD AUTO: 248 K/UL
PMV BLD AUTO: 12.6 FL
POTASSIUM SERPL-SCNC: 4 MMOL/L
PROT SERPL-MCNC: 7.6 G/DL
RBC # BLD AUTO: 4.94 M/UL
SODIUM SERPL-SCNC: 138 MMOL/L
TRIGL SERPL-MCNC: 97 MG/DL
TSH SERPL DL<=0.005 MIU/L-ACNC: 0.92 UIU/ML
WBC # BLD AUTO: 9.43 K/UL

## 2018-12-14 PROCEDURE — 36415 COLL VENOUS BLD VENIPUNCTURE: CPT | Mod: PO

## 2018-12-14 PROCEDURE — 84443 ASSAY THYROID STIM HORMONE: CPT

## 2018-12-14 PROCEDURE — 80053 COMPREHEN METABOLIC PANEL: CPT

## 2018-12-14 PROCEDURE — 84153 ASSAY OF PSA TOTAL: CPT

## 2018-12-14 PROCEDURE — 80061 LIPID PANEL: CPT

## 2018-12-14 PROCEDURE — 85025 COMPLETE CBC W/AUTO DIFF WBC: CPT

## 2019-01-17 DIAGNOSIS — E78.00 PURE HYPERCHOLESTEROLEMIA: ICD-10-CM

## 2019-01-17 RX ORDER — ATORVASTATIN CALCIUM 20 MG/1
20 TABLET, FILM COATED ORAL DAILY
Qty: 90 TABLET | Refills: 12 | Status: SHIPPED | OUTPATIENT
Start: 2019-01-17 | End: 2019-05-06 | Stop reason: SDUPTHER

## 2019-01-21 RX ORDER — CHLORTHALIDONE 25 MG/1
25 TABLET ORAL DAILY
Qty: 90 TABLET | Refills: 3 | OUTPATIENT
Start: 2019-01-21

## 2019-02-28 ENCOUNTER — CLINICAL SUPPORT (OUTPATIENT)
Dept: CARDIOLOGY | Facility: HOSPITAL | Age: 59
End: 2019-02-28
Attending: INTERNAL MEDICINE
Payer: MEDICARE

## 2019-02-28 DIAGNOSIS — Z95.818 STATUS POST PLACEMENT OF IMPLANTABLE LOOP RECORDER: ICD-10-CM

## 2019-02-28 DIAGNOSIS — R55 SYNCOPE AND COLLAPSE: ICD-10-CM

## 2019-05-01 ENCOUNTER — PATIENT OUTREACH (OUTPATIENT)
Dept: ADMINISTRATIVE | Facility: HOSPITAL | Age: 59
End: 2019-05-01

## 2019-05-06 DIAGNOSIS — E78.00 PURE HYPERCHOLESTEROLEMIA: ICD-10-CM

## 2019-05-06 RX ORDER — ATORVASTATIN CALCIUM 20 MG/1
20 TABLET, FILM COATED ORAL DAILY
Qty: 90 TABLET | Refills: 12 | Status: SHIPPED | OUTPATIENT
Start: 2019-05-06 | End: 2020-06-06

## 2019-05-17 ENCOUNTER — OFFICE VISIT (OUTPATIENT)
Dept: FAMILY MEDICINE | Facility: CLINIC | Age: 59
End: 2019-05-17
Payer: MEDICARE

## 2019-05-17 VITALS
SYSTOLIC BLOOD PRESSURE: 150 MMHG | DIASTOLIC BLOOD PRESSURE: 90 MMHG | OXYGEN SATURATION: 98 % | BODY MASS INDEX: 41.31 KG/M2 | TEMPERATURE: 99 F | WEIGHT: 288.56 LBS | HEIGHT: 70 IN | HEART RATE: 61 BPM

## 2019-05-17 DIAGNOSIS — I49.5 SINUS BRADY-TACHY SYNDROME: ICD-10-CM

## 2019-05-17 DIAGNOSIS — R42 VERTIGO: Primary | ICD-10-CM

## 2019-05-17 DIAGNOSIS — E66.01 MORBID OBESITY WITH BMI OF 40.0-44.9, ADULT: Chronic | ICD-10-CM

## 2019-05-17 DIAGNOSIS — R55 VASOVAGAL SYNCOPE: Chronic | ICD-10-CM

## 2019-05-17 DIAGNOSIS — R03.0 ELEVATED BLOOD PRESSURE READING WITHOUT DIAGNOSIS OF HYPERTENSION: ICD-10-CM

## 2019-05-17 DIAGNOSIS — R00.1 BRADYCARDIA: Chronic | ICD-10-CM

## 2019-05-17 PROCEDURE — 99999 PR PBB SHADOW E&M-EST. PATIENT-LVL III: CPT | Mod: PBBFAC,,, | Performed by: INTERNAL MEDICINE

## 2019-05-17 PROCEDURE — 99213 OFFICE O/P EST LOW 20 MIN: CPT | Mod: PBBFAC,PO | Performed by: INTERNAL MEDICINE

## 2019-05-17 PROCEDURE — 99214 PR OFFICE/OUTPT VISIT, EST, LEVL IV, 30-39 MIN: ICD-10-PCS | Mod: S$PBB,,, | Performed by: INTERNAL MEDICINE

## 2019-05-17 PROCEDURE — 99999 PR PBB SHADOW E&M-EST. PATIENT-LVL III: ICD-10-PCS | Mod: PBBFAC,,, | Performed by: INTERNAL MEDICINE

## 2019-05-17 PROCEDURE — 99214 OFFICE O/P EST MOD 30 MIN: CPT | Mod: S$PBB,,, | Performed by: INTERNAL MEDICINE
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
## 2019-11-25 ENCOUNTER — HOSPITAL ENCOUNTER (OUTPATIENT)
Dept: CARDIOLOGY | Facility: HOSPITAL | Age: 59
Discharge: HOME | End: 2019-11-25
Attending: INTERNAL MEDICINE
Payer: MEDICARE

## 2019-11-25 DIAGNOSIS — I49.5 SINUS BRADY-TACHY SYNDROME: ICD-10-CM

## 2019-11-25 DIAGNOSIS — R55 VASOVAGAL SYNCOPE: ICD-10-CM

## 2019-11-25 DIAGNOSIS — E66.01 MORBID OBESITY WITH BMI OF 40.0-44.9, ADULT: ICD-10-CM

## 2019-11-25 DIAGNOSIS — R00.1 BRADYCARDIA: ICD-10-CM

## 2019-11-25 DIAGNOSIS — E78.00 PURE HYPERCHOLESTEROLEMIA: ICD-10-CM

## 2019-11-25 LAB
AORTIC ROOT ANNULUS: 3.42 CM
AORTIC VALVE CUSP SEPERATION: 2.72 CM
ASCENDING AORTA: 3.09 CM
AV INDEX (PROSTH): 1
AV MEAN GRADIENT: 4 MMHG
AV PEAK GRADIENT: 7 MMHG
AV VALVE AREA: 4.74 CM2
AV VELOCITY RATIO: 0.98
CV ECHO LV RWT: 0.6 CM
DOP CALC AO PEAK VEL: 1.32 M/S
DOP CALC AO VTI: 27.88 CM
DOP CALC LVOT AREA: 4.8 CM2
DOP CALC LVOT DIAMETER: 2.46 CM
DOP CALC LVOT PEAK VEL: 1.29 M/S
DOP CALC LVOT STROKE VOLUME: 132.16 CM3
DOP CALCLVOT PEAK VEL VTI: 27.82 CM
E WAVE DECELERATION TIME: 224.36 MSEC
E/A RATIO: 1.22
E/E' RATIO: 8.35 M/S
ECHO LV POSTERIOR WALL: 1.38 CM (ref 0.6–1.1)
FRACTIONAL SHORTENING: 32 % (ref 28–44)
INTERVENTRICULAR SEPTUM: 1.41 CM (ref 0.6–1.1)
IVRT: 0.11 MSEC
LA MAJOR: 6.2 CM
LA MINOR: 6.39 CM
LA WIDTH: 3.23 CM
LEFT ATRIUM SIZE: 4.12 CM
LEFT ATRIUM VOLUME: 71.19 CM3
LEFT INTERNAL DIMENSION IN SYSTOLE: 3.14 CM (ref 2.1–4)
LEFT VENTRICLE DIASTOLIC VOLUME: 97.2 ML
LEFT VENTRICLE SYSTOLIC VOLUME: 39.16 ML
LEFT VENTRICULAR INTERNAL DIMENSION IN DIASTOLE: 4.6 CM (ref 3.5–6)
LEFT VENTRICULAR MASS: 255.4 G
LV LATERAL E/E' RATIO: 7.89 M/S
LV SEPTAL E/E' RATIO: 8.88 M/S
MV PEAK A VEL: 0.58 M/S
MV PEAK E VEL: 0.71 M/S
PISA TR MAX VEL: 2.54 M/S
PULM VEIN S/D RATIO: 1.21
PV PEAK D VEL: 0.48 M/S
PV PEAK S VEL: 0.58 M/S
PV PEAK VELOCITY: 0.97 CM/S
RA MAJOR: 5.18 CM
RA WIDTH: 3.28 CM
RIGHT VENTRICULAR END-DIASTOLIC DIMENSION: 3.95 CM
RV TISSUE DOPPLER FREE WALL SYSTOLIC VELOCITY 1 (APICAL 4 CHAMBER VIEW): 14.33 CM/S
SINUS: 3.48 CM
STJ: 2.71 CM
TDI LATERAL: 0.09 M/S
TDI SEPTAL: 0.08 M/S
TDI: 0.09 M/S
TR MAX PG: 26 MMHG
TRICUSPID ANNULAR PLANE SYSTOLIC EXCURSION: 2.53 CM

## 2019-11-25 PROCEDURE — 93306 TTE W/DOPPLER COMPLETE: CPT | Mod: 26,,, | Performed by: INTERNAL MEDICINE

## 2019-11-25 PROCEDURE — 93306 TTE W/DOPPLER COMPLETE: CPT

## 2019-11-25 PROCEDURE — 93306 TRANSTHORACIC ECHO (TTE) COMPLETE (CUPID ONLY): ICD-10-PCS | Mod: 26,,, | Performed by: INTERNAL MEDICINE

## 2019-12-10 RX ORDER — CHLORTHALIDONE 50 MG/1
TABLET ORAL
Qty: 90 TABLET | Refills: 3 | Status: SHIPPED | OUTPATIENT
Start: 2019-12-10 | End: 2020-07-20 | Stop reason: SDUPTHER

## 2019-12-13 ENCOUNTER — TELEPHONE (OUTPATIENT)
Dept: CARDIOLOGY | Facility: CLINIC | Age: 59
End: 2019-12-13

## 2019-12-13 DIAGNOSIS — I49.8 OTHER SPECIFIED CARDIAC ARRHYTHMIAS: Primary | ICD-10-CM

## 2019-12-13 DIAGNOSIS — R00.1 BRADYCARDIA: Chronic | ICD-10-CM

## 2019-12-13 DIAGNOSIS — R55 VASOVAGAL SYNCOPE: Primary | Chronic | ICD-10-CM

## 2019-12-13 DIAGNOSIS — Z01.89 ENCOUNTER FOR OTHER SPECIFIED SPECIAL EXAMINATIONS: ICD-10-CM

## 2019-12-13 NOTE — TELEPHONE ENCOUNTER
Spoke w/ pt to schedule echo appt & pt stated he is getting a new insurance & wanted to r/s all appt to 1/2020. Will call pt back & coordinate all 4 appts.

## 2020-02-06 ENCOUNTER — PATIENT OUTREACH (OUTPATIENT)
Dept: ADMINISTRATIVE | Facility: OTHER | Age: 60
End: 2020-02-06

## 2020-02-07 ENCOUNTER — HOSPITAL ENCOUNTER (OUTPATIENT)
Dept: CARDIOLOGY | Facility: CLINIC | Age: 60
Discharge: HOME | End: 2020-02-07
Attending: INTERNAL MEDICINE
Payer: MEDICARE

## 2020-02-07 ENCOUNTER — PATIENT MESSAGE (OUTPATIENT)
Dept: ELECTROPHYSIOLOGY | Facility: CLINIC | Age: 60
End: 2020-02-07

## 2020-02-07 ENCOUNTER — OFFICE VISIT (OUTPATIENT)
Dept: ELECTROPHYSIOLOGY | Facility: CLINIC | Age: 60
End: 2020-02-07
Attending: INTERNAL MEDICINE
Payer: MEDICARE

## 2020-02-07 ENCOUNTER — DOCUMENTATION ONLY (OUTPATIENT)
Dept: ELECTROPHYSIOLOGY | Facility: CLINIC | Age: 60
End: 2020-02-07

## 2020-02-07 ENCOUNTER — CLINICAL SUPPORT (OUTPATIENT)
Dept: CARDIOLOGY | Facility: HOSPITAL | Age: 60
End: 2020-02-07
Attending: INTERNAL MEDICINE
Payer: MEDICARE

## 2020-02-07 VITALS
SYSTOLIC BLOOD PRESSURE: 133 MMHG | WEIGHT: 290 LBS | BODY MASS INDEX: 42.95 KG/M2 | DIASTOLIC BLOOD PRESSURE: 80 MMHG | HEART RATE: 62 BPM | HEIGHT: 69 IN | SYSTOLIC BLOOD PRESSURE: 122 MMHG | HEART RATE: 72 BPM | BODY MASS INDEX: 42.97 KG/M2 | DIASTOLIC BLOOD PRESSURE: 90 MMHG | WEIGHT: 291 LBS

## 2020-02-07 DIAGNOSIS — Z95.9 PRESENCE OF CARDIAC AND VASCULAR IMPLANT AND GRAFT: Primary | ICD-10-CM

## 2020-02-07 DIAGNOSIS — I49.5 SINUS BRADY-TACHY SYNDROME: ICD-10-CM

## 2020-02-07 DIAGNOSIS — R55 SYNCOPE AND COLLAPSE: ICD-10-CM

## 2020-02-07 DIAGNOSIS — I49.8 OTHER SPECIFIED CARDIAC ARRHYTHMIAS: ICD-10-CM

## 2020-02-07 DIAGNOSIS — R00.1 BRADYCARDIA: ICD-10-CM

## 2020-02-07 DIAGNOSIS — Z01.89 ENCOUNTER FOR OTHER SPECIFIED SPECIAL EXAMINATIONS: ICD-10-CM

## 2020-02-07 DIAGNOSIS — E66.01 MORBID OBESITY WITH BMI OF 40.0-44.9, ADULT: Primary | Chronic | ICD-10-CM

## 2020-02-07 DIAGNOSIS — Z95.818 STATUS POST PLACEMENT OF IMPLANTABLE LOOP RECORDER: ICD-10-CM

## 2020-02-07 DIAGNOSIS — R55 VASOVAGAL SYNCOPE: Chronic | ICD-10-CM

## 2020-02-07 LAB
ASCENDING AORTA: 2.67 CM
AV INDEX (PROSTH): 1
AV MEAN GRADIENT: 3 MMHG
AV PEAK GRADIENT: 5 MMHG
AV VALVE AREA: 4.53 CM2
AV VELOCITY RATIO: 0.97
BSA FOR ECHO PROCEDURE: 2.53 M2
CV ECHO LV RWT: 0.42 CM
DOP CALC AO PEAK VEL: 1.17 M/S
DOP CALC AO VTI: 22.2 CM
DOP CALC LVOT AREA: 4.5 CM2
DOP CALC LVOT DIAMETER: 2.4 CM
DOP CALC LVOT PEAK VEL: 1.14 M/S
DOP CALC LVOT STROKE VOLUME: 100.65 CM3
DOP CALCLVOT PEAK VEL VTI: 22.26 CM
E WAVE DECELERATION TIME: 263.65 MSEC
E/A RATIO: 0.95
E/E' RATIO: 6.32 M/S
ECHO LV POSTERIOR WALL: 0.96 CM (ref 0.6–1.1)
FRACTIONAL SHORTENING: 26 % (ref 28–44)
INTERVENTRICULAR SEPTUM: 1.07 CM (ref 0.6–1.1)
LA MAJOR: 5.67 CM
LA MINOR: 5.65 CM
LA WIDTH: 3.82 CM
LEFT ATRIUM SIZE: 4.27 CM
LEFT ATRIUM VOLUME INDEX: 32.4 ML/M2
LEFT ATRIUM VOLUME: 78.47 CM3
LEFT INTERNAL DIMENSION IN SYSTOLE: 3.4 CM (ref 2.1–4)
LEFT VENTRICLE DIASTOLIC VOLUME INDEX: 34.58 ML/M2
LEFT VENTRICLE DIASTOLIC VOLUME: 83.64 ML
LEFT VENTRICLE MASS INDEX: 67 G/M2
LEFT VENTRICLE SYSTOLIC VOLUME INDEX: 13.3 ML/M2
LEFT VENTRICLE SYSTOLIC VOLUME: 32.24 ML
LEFT VENTRICULAR INTERNAL DIMENSION IN DIASTOLE: 4.6 CM (ref 3.5–6)
LEFT VENTRICULAR MASS: 162.09 G
LV LATERAL E/E' RATIO: 5.45 M/S
LV SEPTAL E/E' RATIO: 7.5 M/S
MV PEAK A VEL: 0.63 M/S
MV PEAK E VEL: 0.6 M/S
PISA TR MAX VEL: 2.18 M/S
PULM VEIN S/D RATIO: 1.04
PV PEAK D VEL: 0.45 M/S
PV PEAK S VEL: 0.47 M/S
RA MAJOR: 4.5 CM
RA PRESSURE: 3 MMHG
RA WIDTH: 3.78 CM
RIGHT VENTRICULAR END-DIASTOLIC DIMENSION: 4.28 CM
SINUS: 3.87 CM
STJ: 2.63 CM
TDI LATERAL: 0.11 M/S
TDI SEPTAL: 0.08 M/S
TDI: 0.1 M/S
TR MAX PG: 19 MMHG
TRICUSPID ANNULAR PLANE SYSTOLIC EXCURSION: 1.82 CM
TV REST PULMONARY ARTERY PRESSURE: 22 MMHG

## 2020-02-07 PROCEDURE — 99999 PR PBB SHADOW E&M-EST. PATIENT-LVL III: ICD-10-PCS | Mod: PBBFAC,HCNC,, | Performed by: INTERNAL MEDICINE

## 2020-02-07 PROCEDURE — 3078F DIAST BP <80 MM HG: CPT | Mod: HCNC,CPTII,S$GLB, | Performed by: INTERNAL MEDICINE

## 2020-02-07 PROCEDURE — 93010 ELECTROCARDIOGRAM REPORT: CPT | Mod: HCNC,S$GLB,, | Performed by: INTERNAL MEDICINE

## 2020-02-07 PROCEDURE — 99215 OFFICE O/P EST HI 40 MIN: CPT | Mod: HCNC,S$GLB,, | Performed by: INTERNAL MEDICINE

## 2020-02-07 PROCEDURE — C8929 TTE W OR WO FOL WCON,DOPPLER: HCPCS | Mod: HCNC

## 2020-02-07 PROCEDURE — 93306 ECHO (CUPID ONLY): ICD-10-PCS | Mod: 26,HCNC,, | Performed by: INTERNAL MEDICINE

## 2020-02-07 PROCEDURE — 99999 PR PBB SHADOW E&M-EST. PATIENT-LVL III: CPT | Mod: PBBFAC,HCNC,, | Performed by: INTERNAL MEDICINE

## 2020-02-07 PROCEDURE — 93306 TTE W/DOPPLER COMPLETE: CPT | Mod: 26,HCNC,, | Performed by: INTERNAL MEDICINE

## 2020-02-07 PROCEDURE — 93010 RHYTHM STRIP: ICD-10-PCS | Mod: HCNC,S$GLB,, | Performed by: INTERNAL MEDICINE

## 2020-02-07 PROCEDURE — 3075F SYST BP GE 130 - 139MM HG: CPT | Mod: HCNC,CPTII,S$GLB, | Performed by: INTERNAL MEDICINE

## 2020-02-07 PROCEDURE — 93005 ELECTROCARDIOGRAM TRACING: CPT | Mod: HCNC,S$GLB,, | Performed by: INTERNAL MEDICINE

## 2020-02-07 PROCEDURE — 3078F PR MOST RECENT DIASTOLIC BLOOD PRESSURE < 80 MM HG: ICD-10-PCS | Mod: HCNC,CPTII,S$GLB, | Performed by: INTERNAL MEDICINE

## 2020-02-07 PROCEDURE — 3008F PR BODY MASS INDEX (BMI) DOCUMENTED: ICD-10-PCS | Mod: HCNC,CPTII,S$GLB, | Performed by: INTERNAL MEDICINE

## 2020-02-07 PROCEDURE — 99499 UNLISTED E&M SERVICE: CPT | Mod: S$GLB,,, | Performed by: INTERNAL MEDICINE

## 2020-02-07 PROCEDURE — 99215 PR OFFICE/OUTPT VISIT, EST, LEVL V, 40-54 MIN: ICD-10-PCS | Mod: HCNC,S$GLB,, | Performed by: INTERNAL MEDICINE

## 2020-02-07 PROCEDURE — 93005 RHYTHM STRIP: ICD-10-PCS | Mod: HCNC,S$GLB,, | Performed by: INTERNAL MEDICINE

## 2020-02-07 PROCEDURE — 3008F BODY MASS INDEX DOCD: CPT | Mod: HCNC,CPTII,S$GLB, | Performed by: INTERNAL MEDICINE

## 2020-02-07 PROCEDURE — 3075F PR MOST RECENT SYSTOLIC BLOOD PRESS GE 130-139MM HG: ICD-10-PCS | Mod: HCNC,CPTII,S$GLB, | Performed by: INTERNAL MEDICINE

## 2020-02-07 PROCEDURE — 99499 RISK ADDL DX/OHS AUDIT: ICD-10-PCS | Mod: S$GLB,,, | Performed by: INTERNAL MEDICINE

## 2020-02-07 NOTE — PROGRESS NOTES
Procedure exp[lained. 22 g sl started in left a/c for optison use..  Per angélica jones, optison given ivp via sl for imaging. Denies transfusion rxn. Tolerated well. Sl d/elias after. Pressure applied
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
## 2020-02-07 NOTE — PROGRESS NOTES
Attempted to interrogate pt's MDT loop. Device at EOS, unable to interrogate. Pt will see  today in clinic.

## 2020-02-26 ENCOUNTER — TELEPHONE (OUTPATIENT)
Dept: ELECTROPHYSIOLOGY | Facility: CLINIC | Age: 60
End: 2020-02-26

## 2020-02-26 NOTE — TELEPHONE ENCOUNTER
Spoke to Black Preciado    CONFIRMED procedure arrival time of 1pm  Reiterated instructions including:  -Directions to check in desk  -NPO after midnight night prior to procedure   -Pre-procedure LABS 2/7. Labs received  --Bathe night prior and morning prior to procedure with Hibiclens solution or an antibacterial soap     Pt verbalizes understanding of above and appreciates call.

## 2020-03-02 ENCOUNTER — HOSPITAL ENCOUNTER (OUTPATIENT)
Facility: HOSPITAL | Age: 60
Discharge: HOME | End: 2020-03-02
Attending: INTERNAL MEDICINE | Admitting: INTERNAL MEDICINE
Payer: MEDICARE

## 2020-03-02 VITALS
HEART RATE: 66 BPM | BODY MASS INDEX: 41.52 KG/M2 | OXYGEN SATURATION: 96 % | HEIGHT: 70 IN | SYSTOLIC BLOOD PRESSURE: 131 MMHG | DIASTOLIC BLOOD PRESSURE: 77 MMHG | RESPIRATION RATE: 18 BRPM | WEIGHT: 290 LBS | TEMPERATURE: 97 F

## 2020-03-02 DIAGNOSIS — Z01.812 PRE-PROCEDURE LAB EXAM: ICD-10-CM

## 2020-03-02 DIAGNOSIS — R55 SYNCOPE AND COLLAPSE: ICD-10-CM

## 2020-03-02 DIAGNOSIS — Z95.9 PRESENCE OF CARDIAC AND VASCULAR IMPLANT AND GRAFT: ICD-10-CM

## 2020-03-02 DIAGNOSIS — I49.5 SINUS BRADY-TACHY SYNDROME: ICD-10-CM

## 2020-03-02 DIAGNOSIS — Z95.818 STATUS POST PLACEMENT OF IMPLANTABLE LOOP RECORDER: Primary | ICD-10-CM

## 2020-03-02 DIAGNOSIS — I49.8 OTHER SPECIFIED CARDIAC ARRHYTHMIAS: ICD-10-CM

## 2020-03-02 PROCEDURE — 99152 MOD SED SAME PHYS/QHP 5/>YRS: CPT | Mod: HCNC | Performed by: INTERNAL MEDICINE

## 2020-03-02 PROCEDURE — 99220 PR INITIAL OBSERVATION CARE,LEVL III: ICD-10-PCS | Mod: HCNC,,, | Performed by: INTERNAL MEDICINE

## 2020-03-02 PROCEDURE — 99220 PR INITIAL OBSERVATION CARE,LEVL III: CPT | Mod: HCNC,,, | Performed by: INTERNAL MEDICINE

## 2020-03-02 PROCEDURE — 33286 PR REMOVAL, SUBQ CARDIAC RHYTHM MONITOR: ICD-10-PCS | Mod: HCNC,,, | Performed by: INTERNAL MEDICINE

## 2020-03-02 PROCEDURE — 33286 RMVL SUBQ CAR RHYTHM MNTR: CPT | Mod: HCNC | Performed by: INTERNAL MEDICINE

## 2020-03-02 PROCEDURE — 63600175 PHARM REV CODE 636 W HCPCS: Mod: HCNC | Performed by: INTERNAL MEDICINE

## 2020-03-02 PROCEDURE — 63600175 PHARM REV CODE 636 W HCPCS: Mod: HCNC | Performed by: NURSE PRACTITIONER

## 2020-03-02 PROCEDURE — 25000003 PHARM REV CODE 250: Mod: HCNC | Performed by: INTERNAL MEDICINE

## 2020-03-02 PROCEDURE — 93005 ELECTROCARDIOGRAM TRACING: CPT | Mod: HCNC,59

## 2020-03-02 PROCEDURE — 99152 PR MOD CONSCIOUS SEDATION, SAME PHYS, 5+ YRS, FIRST 15 MIN: ICD-10-PCS | Mod: HCNC,,, | Performed by: INTERNAL MEDICINE

## 2020-03-02 PROCEDURE — 33286 RMVL SUBQ CAR RHYTHM MNTR: CPT | Mod: HCNC,,, | Performed by: INTERNAL MEDICINE

## 2020-03-02 PROCEDURE — 99152 MOD SED SAME PHYS/QHP 5/>YRS: CPT | Mod: HCNC,,, | Performed by: INTERNAL MEDICINE

## 2020-03-02 PROCEDURE — 93010 EKG 12-LEAD: ICD-10-PCS | Mod: HCNC,,, | Performed by: INTERNAL MEDICINE

## 2020-03-02 PROCEDURE — 93010 ELECTROCARDIOGRAM REPORT: CPT | Mod: HCNC,,, | Performed by: INTERNAL MEDICINE

## 2020-03-02 RX ORDER — MIDAZOLAM HYDROCHLORIDE 1 MG/ML
INJECTION, SOLUTION INTRAMUSCULAR; INTRAVENOUS
Status: DISCONTINUED | OUTPATIENT
Start: 2020-03-02 | End: 2020-03-02 | Stop reason: HOSPADM

## 2020-03-02 RX ORDER — LIDOCAINE HYDROCHLORIDE AND EPINEPHRINE 20; 10 MG/ML; UG/ML
INJECTION, SOLUTION INFILTRATION; PERINEURAL
Status: DISCONTINUED | OUTPATIENT
Start: 2020-03-02 | End: 2020-03-02 | Stop reason: HOSPADM

## 2020-03-02 RX ORDER — SODIUM CHLORIDE 9 MG/ML
INJECTION, SOLUTION INTRAVENOUS CONTINUOUS
Status: ACTIVE | OUTPATIENT
Start: 2020-03-02

## 2020-03-02 RX ORDER — VANCOMYCIN HCL IN 5 % DEXTROSE 1G/250ML
1000 PLASTIC BAG, INJECTION (ML) INTRAVENOUS
Status: DISPENSED | OUTPATIENT
Start: 2020-03-02

## 2020-03-02 RX ORDER — FENTANYL CITRATE 50 UG/ML
INJECTION, SOLUTION INTRAMUSCULAR; INTRAVENOUS
Status: DISCONTINUED | OUTPATIENT
Start: 2020-03-02 | End: 2020-03-02 | Stop reason: HOSPADM

## 2020-03-02 RX ADMIN — VANCOMYCIN HYDROCHLORIDE 1000 MG: 1 INJECTION, POWDER, LYOPHILIZED, FOR SOLUTION INTRAVENOUS at 03:03

## 2020-03-02 NOTE — PROGRESS NOTES
Potassium lab redrawn this AM hemolyzed. Discussed with LATOYA Brower to proceed with ILR removal with potassium of 3.3.

## 2020-03-02 NOTE — PLAN OF CARE
Received report from ep. Patient s/p loop removal, AAOx3. VSS, no c/o pain or discomfort at this time, resp even and unlabored. Incision to chest is CDI. No active bleeding. No hematoma noted. Post procedure protocol reviewed with patient and patient's family. Understanding verbalized. Family members at bedside. Nurse call bell within reach. Will continue to monitor per post procedure protocol.

## 2020-03-02 NOTE — LETTER
February 7, 2020    Black ALFONSO Preciado  [address] [facility] [id number]     Patient Instructions  Removal of Loop Recorder     Important Medication Information:    · You may take your  usual morning medications with a sip of water on the day of your procedure.     Day of Procedure:    3/2/20 at 1:30 PM   Please report to Cardiology Waiting Room on the 3rd floor of the Hospital    · Do not eat or drink anything after 12 midnight on the night before your procedure.  · Wash your chest with HIBICLENS OR AN ANTIBACTERIAL SOAP (such as Dial) on the night before and the morning of your procedure.  · Please do not wear makeup, especially mascara, when arriving for your procedure.  · You will be going home after your procedure.     Directions to Cardiology Waiting Room  If you park in the Parking Garage:  Take elevators to the 2nd floor  Walk up ramp and turn right by Gold Elevators  Take elevator to the 3rd floor  Upon exiting the elevator, turn away from the clinic areas  Walk long nuñez around to front of hospital to area with windows overlooking [facility]  Check in at Reception Desk  OR  If family is dropping you off:  Have them drop you off at the front of the Hospital  (Near the ER, where all the flags are hung).  Take the E elevators to the 3rd floor.  Check in at the Reception Desk in the waiting room.                    Post-Procedure Patient Discharge Instructions  Loop Recorder Removal    Wound Care   If you are discharged with a standard dressing over the incision, you may remove the dressing after 24 hours.    If there are Steri-strips (strips of tape) over your incision, leave them on until your follow-up appointment. They may begin to fall off on their own, which is normal. If there is Dermabond (clear glue) over your incision, do not scrub it off. It acts as a barrier and will eventually disappear.   You may be discharged with 5 days of oral antibiotics. Please take the full prescription  until it is gone.   Do not get the incision wet for 48 hours following the procedure. You may sponge bath during this period, working around the incision. After 48 hours, you may shower, but you should still try to keep this area as dry as possible, and avoid direct water contact to the incision (allow the water to hit back of your shoulder rather than directly on the incision). Gently pat the incision dry if it does get wet.   You may take regular showers after 2 weeks, unless otherwise indicated at your follow-up visit.   Do not submerge the incision in a tub, pool, or body of water for 6 weeks.   If you notice unusual swelling, redness, drainage, have more device site pain, chest pain, shortness of breath, or have a fever greater than 100 degrees, call our device clinic immediately: [phone] or [phone] during normal office hours. You may call [phone] after-hours or on weekends and ask for the electrophysiologist on call.  Activity    If you were driving prior to the procedure, you may resume driving right after your procedure (as long you did not receive any sedation). If you have a history of passing out or a history of certain arrhythmias, there may be driving restrictions unrelated to the procedure. Please clarify with your physician if this is the case.   Avoid rough contact at the incision site for 6 weeks.   You may participate in sexual activity unless otherwise instructed.   You may return to work the follow day unless told otherwise by your provider.      Any need to reschedule or cancel procedures, or any questions regarding your procedures should be addressed directly with the Arrhythmia Department Nurses at the following phone number: [phone].

## 2020-03-02 NOTE — INTERVAL H&P NOTE
Mr. Preciado presents today for ILR removal. He presents today to [facility] for scheduled ILR removal without reimplant. He denies any chest pain, palpitations, SOB, LAURA, dizziness, light headedness, weakness, syncope, or near syncopal episodes. He denies any bleeding, infections, fevers, rashes, or surgeries in the past 30 days. He is currently taking aspirin 81 mg daily, no OAC.     The patient has been examined and the H&P has been reviewed:    I concur with the findings and no changes have occurred since H&P was written.    Review of Systems   Constitution: Negative. Negative for fevers/chills, weakness and malaise/fatigue.   HENT: Negative.  Negative for ear pain and tinnitus.    Eyes: Negative for blurred vision.   Cardiovascular: Negative. Negative for chest pain, dyspnea on exertion, leg swelling, near-syncope, palpitations and syncope.   Respiratory: Negative. Negative for shortness of breath.    Endocrine: Negative.  Negative for polyuria.   Hematologic/Lymphatic: Negative for significant bleeding or bruise/bleed easily.   Skin: Negative.  Negative for rash.   Musculoskeletal: Negative.  Negative for joint pain and muscle weakness.   Gastrointestinal: Negative.  Negative for abdominal pain hematemesis, hematochezia, and change in bowel habit.   Genitourinary: Negative for frequency or hematuria.   Neurological: Negative.  Negative for dizziness.   Psychiatric/Behavioral: Negative.  Negative for depression. The patient is not nervous/anxious.       Physical Exam  Constitutional: He is oriented to person, place, and time. He appears well-developed and well-nourished.   HENT:   Head: Normocephalic and atraumatic.   Eyes: Conjunctivae, EOM and lids are normal. No scleral icterus.   Neck: Normal range of motion. No JVD present. No tracheal deviation present. No thyromegaly present.   Cardiovascular: Normal rate and intact distal pulses. Regular rhythm present. No extrasystoles are present. PMI is not displaced. Exam  reveals no gallop and no friction rub. No murmur heard.  Pulses:       Radial pulses are 2+ on the right side, and 2+ on the left side.   Pulmonary/Chest: Effort normal and breath sounds normal. No accessory muscle usage. No tachypnea. No respiratory distress. He has no wheezes. He has no rales.   Abdominal: Soft. Bowel sounds are normal. He exhibits no distension. There is no hepatosplenomegaly. There is no tenderness.   Musculoskeletal: Normal range of motion. He exhibits no edema.   Neurological: He is alert and oriented to person, place, and time. He has normal reflexes. He exhibits normal muscle tone.   Skin: Skin is warm and dry. No rash noted.   Psychiatric: He has a normal mood and affect. His behavior is normal.   Nursing note and vitals reviewed.    Labs from 2/4/2020 through today reviewed. Potassium today...    Active Hospital Problems    Diagnosis  POA    Other specified cardiac arrhythmias [I49.8]  Yes      Resolved Hospital Problems   No resolved problems to display.     Plan:  -ILR removal without reimplant.   -Local anesthetic.    Discussed with the patient the risks, benefits, and alternatives of ILR removal. Our discussion of risks included (but was not limited to) the possibility of pain, infection, bleeding, rare risk of device erosion if left in, and death. All questions were answered. Patient verbalized understanding and wishes to proceed. No further questioned voiced at this time. ASA completed. Consents signed.    KASSIDY Gutierrez-BRIAN  Cardiology Electrophysiology  NP   [facility]    Attending: Cy Villeda MD

## 2020-03-03 ENCOUNTER — TELEPHONE (OUTPATIENT)
Dept: ELECTROPHYSIOLOGY | Facility: CLINIC | Age: 60
End: 2020-03-03

## 2020-03-03 DIAGNOSIS — E87.6 HYPOKALEMIA: Primary | ICD-10-CM

## 2020-03-03 NOTE — TELEPHONE ENCOUNTER
Pt had procedure this morning. No need for further blood work.     ----- Message from Anna Siu sent at 3/2/2020  4:44 PM CST -----  Contact: Lab- April  Pt needs to repeat potassium lab, specimen was hemolysis.  Thanks

## 2020-03-03 NOTE — DISCHARGE SUMMARY
[facility]iac [facility]  Cardiac Electrophysiology  Discharge Summary      Patient Name: Black Preciado  MRN: [id number]  Admission Date: 3/2/2020  Hospital Length of Stay: 0 days  Discharge Date and Time: 3/2/2020  5:30 PM  Attending Physician: Cy Villeda MD   Discharging Provider: Aga Valencia NP  Primary Care Physician: Manpreet Brewer MD    HPI: Mr. Preciado presents today for ILR removal. He presents today to [facility] for scheduled ILR removal without reimplant. He denies any chest pain, palpitations, SOB, LAURA, dizziness, light headedness, weakness, syncope, or near syncopal episodes. He denies any bleeding, infections, fevers, rashes, or surgeries in the past 30 days. He is currently taking aspirin 81 mg daily, no OAC.      The patient has been examined and the H&P has been reviewed:     I concur with the findings and no changes have occurred since H&P was written.    Procedure(s) (LRB):  REMOVAL, IMPLANTABLE LOOP RECORDER (N/A)     Indwelling Lines/Drains at time of discharge:  Lines/Drains/Airways     None           Hospital Course: Patient underwent ILR removal. Tolerated procedure well with no acute complications noted. Left chest wall site with C/D/I with no bleeding, drainage, hematoma, or pain to site. Aquacel foam dressing placed to site by RN. VSS. Patient ambulating, tolerating PO intake, and voiding without any issues. Patient feeling well with no complaints. Patient to follow up with device clinic in 1 week for wound check. Follow up with Dr. Villeda PRMARIANNA. Continue all home medications.Encouraged potassium rich diet. Will redraw potassium level in 1 week. Discharge plans/instructions discussed with patient who verbalized understanding and agreement of plans of care. Daughter will be driving patient home. No further questions or concern voiced at this time. Patient discharged home in stable condition.     Physical Exam  Constitutional: He is oriented to person, place,  and time. He appears well-developed and well-nourished.   HENT:   Head: Normocephalic and atraumatic.   Eyes: Conjunctivae, EOM and lids are normal. No scleral icterus.   Neck: Normal range of motion. No JVD present. No tracheal deviation present.   Cardiovascular: Normal rate and intact distal pulses. Regular rhythm present. No extrasystoles are present. PMI is not displaced. Exam reveals no gallop and no friction rub. No murmur heard.  Pulses:       Radial pulses are 2+ on the right side, and 2+ on the left side.   Pulmonary/Chest: Effort normal and breath sounds normal. No accessory muscle usage. No tachypnea. No respiratory distress. He has no wheezes. He has no rales.   Abdominal: Soft. Bowel sounds are normal. He exhibits no distension. There is no tenderness.   Musculoskeletal: Normal range of motion. He exhibits no edema. Left chest wall incision site C/D/I with no bleeding hematoma, drainage, or bleeding noted.   Neurological: He is alert and oriented to person, place, and time. He has normal reflexes. He exhibits normal muscle tone.   Skin: Skin is warm and dry. No rash noted.   Psychiatric: He has a normal mood and affect. His behavior is normal.   Nursing note and vitals reviewed.    Consults:   None.     Significant Diagnostic Studies: Labs from 2/7/2020 through today reviewed.  Final Active Diagnoses:    Diagnosis Date Noted POA    PRINCIPAL PROBLEM:  Status post placement of implantable loop recorder [Z95.818]  Yes    Other specified cardiac arrhythmias [I49.8] 03/02/2020 Yes    Syncope and collapse [R55]  No      Problems Resolved During this Admission:     No new Assessment & Plan notes have been filed under this hospital service since the last note was generated.  Service: Arrhythmia    Discharged Condition: stable    Disposition: Home or Self Care    Follow Up:  Follow-up Information     WOUND CHECK, [facility] In 1 week.    Why:  s/p ILR removal           Cy Villeda MD.    Specialties:   Electrophysiology, Cardiology  Why:  As needed  Contact information:  [address]  [facility] [id number]  [phone]                 Patient Instructions:      Diet Cardiac     No driving until:   Order Comments: No driving or operating heavy machinery for 24-48 hours after your procedure because you received sedation.     Other restrictions (specify):   Order Comments: Medications:  -Continue to take your home medications as listed on your medication list after you are discharged.  -If you have problems or side effects caused by your medications, call your Physician.    New Medications:  None.    Diet  -You may resume oral intake after you are discharged, as long you have no swallowing difficulties.    Side effects:  -You may be drowsy for the remainder of the day because you received sedation.    Because you have received sedation for this procedure:  -Limit activity for the remainder of the day.  -Do not drive or operate any equipment for the remainder of the day.  -Do not smoke for at least 6 hours and until you are fully awake and alert.  -Do not drink alcoholic beverage for 24 hours.  -Defer important decision making until the following day.    Go to the Emergency Department if you develop:  -Bleeding.  -Weakness or numbness.  -Visual, gait or speech disturbance.  -New chest pain, palpitations, shortness of breath, rapid heart beat, or fainting.  -Fever.    Monitor your site where your Loop Recorder was removed:  1. You should avoid getting your wound wet for 5 days. You may shower in 48 hours. Do not let beam of shower hit site directly and no scrubbing in area. Do not submerge incision site in water for 2 weeks.    2. You can remove the outside bandage in 48 hours. You should not remove the purple skin glue that covers your wound. This will come off on its own.  3. Every day, take your temperature and check your incision for signs of infection (redness, swelling, drainage, or warmth) for the next  7 days. It is normal to have some pain around the site and some bruising. However constant pain, severe tenderness and pus/drainage coming from your wound is not normal and you should call your physician immediately or seek attention at the ER. Fevers and chills and feeling ill is not normal and you should seek immediate medical attention.    Follow up:  -Wound check in 1 week.  -Dr. Villeda as needed.     Notify your health care provider if you experience any of the following:   Order Comments: For any concerning medical symptoms.     Notify your health care provider if you experience any of the following:  increased confusion or weakness     Notify your health care provider if you experience any of the following:  persistent dizziness, light-headedness, or visual disturbances     Notify your health care provider if you experience any of the following:  worsening rash     Notify your health care provider if you experience any of the following:  severe persistent headache     Notify your health care provider if you experience any of the following:  difficulty breathing or increased cough     Notify your health care provider if you experience any of the following:  redness, tenderness, or signs of infection (pain, swelling, redness, odor or green/yellow discharge around incision site)     Notify your health care provider if you experience any of the following:  severe uncontrolled pain     Notify your health care provider if you experience any of the following:  persistent nausea and vomiting or diarrhea     Notify your health care provider if you experience any of the following:  temperature >100.4     Remove dressing in 48 hours     Medications:  Reconciled Home Medications:      Medication List      CONTINUE taking these medications    aspirin 81 MG EC tablet  Commonly known as:  ECOTRIN  Take 1 tablet (81 mg total) by mouth once daily.     atorvastatin 20 MG tablet  Commonly known as:  LIPITOR  Take 1 tablet (20 mg  total) by mouth once daily.     betaxoloL 10 mg Tab  Commonly known as:  KERLONE  Take 1/2 tab (5 mg) PO qd.     chlorthalidone 50 MG Tab  Commonly known as:  HYGROTEN  TAKE 1 TABLET BY MOUTH EVERY DAY     fish oil-omega-3 fatty acids 300-1,000 mg capsule  Take 1 capsule by mouth once daily.          Plan:   -Continue all home medications.  -Wound check in 1 week.  -Follow up with Dr. Villeda as needed.  -Encouraged potassium rich diet.  -Re-draw potassium in 1 week.    Time spent on the discharge of patient: 10 minutes    Aga Valencia NP  Cardiac Electrophysiology  [facility]    Attending: Cy Villeda MD

## 2020-07-20 RX ORDER — CHLORTHALIDONE 50 MG/1
50 TABLET ORAL DAILY
Qty: 90 TABLET | Refills: 3 | Status: SHIPPED | OUTPATIENT
Start: 2020-07-20 | End: 2022-03-10

## 2020-08-12 ENCOUNTER — PATIENT OUTREACH (OUTPATIENT)
Dept: ADMINISTRATIVE | Facility: HOSPITAL | Age: 60
End: 2020-08-12

## 2020-08-12 DIAGNOSIS — Z13.6 SCREENING FOR CARDIOVASCULAR CONDITION: Primary | ICD-10-CM

## 2020-10-19 DIAGNOSIS — E78.00 PURE HYPERCHOLESTEROLEMIA: ICD-10-CM

## 2020-10-19 RX ORDER — ATORVASTATIN CALCIUM 20 MG/1
20 TABLET, FILM COATED ORAL DAILY
Qty: 30 TABLET | Refills: 0 | Status: SHIPPED | OUTPATIENT
Start: 2020-10-19 | End: 2021-02-17 | Stop reason: SDUPTHER

## 2020-11-06 ENCOUNTER — PATIENT OUTREACH (OUTPATIENT)
Dept: ADMINISTRATIVE | Facility: CLINIC | Age: 60
End: 2020-11-06

## 2020-11-06 DIAGNOSIS — I49.5 SICK SINUS SYNDROME: ICD-10-CM

## 2020-11-06 DIAGNOSIS — Z95.0 PACEMAKER: Primary | ICD-10-CM

## 2020-11-06 NOTE — PATIENT INSTRUCTIONS
Discharge Instructions for Pacemaker Implantation  You have had a procedure to insert a pacemaker. Once inside your body, this small electronic device helps keep your heart from beating too slowly. A pacemaker cant fix existing heart problems. But it can help you feel better and have more energy. As you recover, follow all of the instructions you are given, including those below.  Activity  · Dont drive until your doctor says its OK. Plan to have someone drive you home after the procedure.  · Follow the instructions you are given about limiting your activity.  · If you are fitted with an arm sling, keep your arm in the sling for as long as your doctor tells you to. Most often, the sling will be removed the following day though you may be instructed to sleep with it on for a period to prevent damage to the pacemaker while it's healing.  · Do not raise your arm on the incision side above shoulder level or stretch your arm behind your back for as long as directed by your doctor. This gives the leads a chance to secure themselves inside your heart.  · Ask your doctor when you can expect to return to work. Depending on the type of work you do, you may have restrictions until your cardiologist clears you for unrestricted activity.  · You can still exercise. Its good for your body and your heart. Talk with your doctor about an exercise plan and the types of exercise to minimize the risk of damaging your pacemaker.  Other precautions  · Follow your doctor's directions carefully for wound care. If there is a dressing, ask whether you should remove it or keep it on until your next visit. Never put any creams, lotions, or products like peroxide on an incision unless your doctor tells you to. Do not get the incision wet until your doctor says it's OK.  · Every day, take your temperature and check your incision for signs of infection (redness, swelling, drainage, or warmth). Do this for 7 days or as advised by your  doctor.  · Learn to take your own pulse. Keep a record of your results. Ask your doctor what pulse rate means you should call for medical attention.  · Before you receive any treatment, tell all healthcare providers (including your dentist) that you have a pacemaker.  · You will be given an ID card that contains information about your pacemaker. Always carry this card with you. You can show this card if your pacemaker sets off a metal detector. You should also show it to avoid screening with a hand-held security wand.  · Keep your cell phone away from your pacemaker. Dont carry the phone in your shirt pocket overlying the pacemaker, even when its turned off.  · Avoid strong magnets. Examples are those used in MRIs or in hand-held security wands. Some pacemakers are now safe to use with MRI scanners. Ask your doctor if you have such a pacemaker.  · Avoid strong electrical fields. Examples are those made by radio transmitting towers, ham radios, and heavy-duty electrical equipment.  · Avoid leaning over the open suarez of a running car. A running engine creates an electrical field. Most household and yard appliances will not cause any problems. If you use any large power tools, such as an industrial , talk with your doctor.   Follow-up care  · See your cardiologist in the next 7 to 10 days. Call and make an appointment as soon as you get home.  · Make regular follow-up appointments with your doctor. He or she will check the pacemaker to make sure its working properly.  · Plan on having periodic check-ups with your healthcare provider to evaluate the battery life of your pacemaker. Depending on your device and how much your body uses the pacing functions of the pacemaker, you will need a new device generator implanted at some point, generally about every 5 to 7 years.  · Some pacemakers have a built-in antenna that can transmit information such as trouble alerts over the internet to your doctor. Ask your  doctor if your pacemaker is capable of remote monitoring.  When should I call my healthcare provider?  Call 911 if you have:  · Chest pain  · Severe trouble breathing     Call your healthcare provider right away if you have any of these:  · Dizziness  · Lack of energy  · Fainting spells  · Twitching chest muscles  · Rapid pulse or pounding heartbeat  · Shortness of breath  · Pain around your pacemaker  · Fever above 100.4°F (38°C) or other signs of infection (redness, swelling, drainage, or warmth at the incision site)  · Your incision is not healing or your incision separates or opens  · Hiccups that wont stop  · Redness, severe swelling, drainage, worsening pain, bleeding, or warmth at the incision site  · If your pacemaker generator feels loose or like it is wiggling in the pocket under the skin   Date Last Reviewed: 6/1/2016  © 4891-2110 [employer]. [address] [place] [address], [place] [address]. All rights reserved. This information is not intended as a substitute for professional medical care. Always follow your healthcare professional's instructions.

## 2021-01-21 ENCOUNTER — PATIENT MESSAGE (OUTPATIENT)
Dept: FAMILY MEDICINE | Facility: CLINIC | Age: 61
End: 2021-01-21

## 2021-01-21 DIAGNOSIS — E78.00 PURE HYPERCHOLESTEROLEMIA: ICD-10-CM

## 2021-01-21 DIAGNOSIS — R73.9 HYPERGLYCEMIA: ICD-10-CM

## 2021-01-21 DIAGNOSIS — R55 SYNCOPE AND COLLAPSE: ICD-10-CM

## 2021-01-21 DIAGNOSIS — Z00.00 ROUTINE MEDICAL EXAM: ICD-10-CM

## 2021-01-21 DIAGNOSIS — E66.01 MORBID OBESITY WITH BMI OF 40.0-44.9, ADULT: Primary | ICD-10-CM

## 2021-01-21 DIAGNOSIS — N40.0 BENIGN PROSTATIC HYPERPLASIA, UNSPECIFIED WHETHER LOWER URINARY TRACT SYMPTOMS PRESENT: ICD-10-CM

## 2021-01-28 ENCOUNTER — PATIENT MESSAGE (OUTPATIENT)
Dept: FAMILY MEDICINE | Facility: CLINIC | Age: 61
End: 2021-01-28

## 2021-02-03 ENCOUNTER — PATIENT OUTREACH (OUTPATIENT)
Dept: ADMINISTRATIVE | Facility: HOSPITAL | Age: 61
End: 2021-02-03

## 2021-02-15 ENCOUNTER — LAB VISIT (OUTPATIENT)
Dept: LAB | Facility: HOSPITAL | Age: 61
End: 2021-02-15
Attending: INTERNAL MEDICINE
Payer: MEDICARE

## 2021-02-15 DIAGNOSIS — Z00.00 ROUTINE MEDICAL EXAM: ICD-10-CM

## 2021-02-15 DIAGNOSIS — E66.01 MORBID OBESITY WITH BMI OF 40.0-44.9, ADULT: ICD-10-CM

## 2021-02-15 DIAGNOSIS — R73.9 HYPERGLYCEMIA: ICD-10-CM

## 2021-02-15 DIAGNOSIS — E78.00 PURE HYPERCHOLESTEROLEMIA: ICD-10-CM

## 2021-02-15 DIAGNOSIS — R55 SYNCOPE AND COLLAPSE: ICD-10-CM

## 2021-02-15 DIAGNOSIS — N40.0 BENIGN PROSTATIC HYPERPLASIA, UNSPECIFIED WHETHER LOWER URINARY TRACT SYMPTOMS PRESENT: ICD-10-CM

## 2021-02-15 LAB
ALBUMIN SERPL BCP-MCNC: 3.8 G/DL (ref 3.5–5.2)
ALP SERPL-CCNC: 85 U/L (ref 55–135)
ALT SERPL W/O P-5'-P-CCNC: 49 U/L (ref 10–44)
ANION GAP SERPL CALC-SCNC: 10 MMOL/L (ref 8–16)
AST SERPL-CCNC: 34 U/L (ref 10–40)
BASOPHILS # BLD AUTO: 0.05 K/UL (ref 0–0.2)
BASOPHILS NFR BLD: 0.6 % (ref 0–1.9)
BILIRUB SERPL-MCNC: 0.7 MG/DL (ref 0.1–1)
BUN SERPL-MCNC: 12 MG/DL (ref 6–20)
CALCIUM SERPL-MCNC: 9.2 MG/DL (ref 8.7–10.5)
CHLORIDE SERPL-SCNC: 101 MMOL/L (ref 95–110)
CHOLEST SERPL-MCNC: 145 MG/DL (ref 120–199)
CHOLEST/HDLC SERPL: 3 {RATIO} (ref 2–5)
CO2 SERPL-SCNC: 30 MMOL/L (ref 23–29)
COMPLEXED PSA SERPL-MCNC: 0.35 NG/ML (ref 0–4)
CREAT SERPL-MCNC: 0.8 MG/DL (ref 0.5–1.4)
DIFFERENTIAL METHOD: ABNORMAL
EOSINOPHIL # BLD AUTO: 0.7 K/UL (ref 0–0.5)
EOSINOPHIL NFR BLD: 8.5 % (ref 0–8)
ERYTHROCYTE [DISTWIDTH] IN BLOOD BY AUTOMATED COUNT: 13.2 % (ref 11.5–14.5)
EST. GFR  (AFRICAN AMERICAN): >60 ML/MIN/1.73 M^2
EST. GFR  (NON AFRICAN AMERICAN): >60 ML/MIN/1.73 M^2
ESTIMATED AVG GLUCOSE: 117 MG/DL (ref 68–131)
GLUCOSE SERPL-MCNC: 112 MG/DL (ref 70–110)
HBA1C MFR BLD: 5.7 % (ref 4–5.6)
HCT VFR BLD AUTO: 46.5 % (ref 40–54)
HDLC SERPL-MCNC: 48 MG/DL (ref 40–75)
HDLC SERPL: 33.1 % (ref 20–50)
HGB BLD-MCNC: 15.6 G/DL (ref 14–18)
IMM GRANULOCYTES # BLD AUTO: 0.02 K/UL (ref 0–0.04)
IMM GRANULOCYTES NFR BLD AUTO: 0.2 % (ref 0–0.5)
LDLC SERPL CALC-MCNC: 79 MG/DL (ref 63–159)
LYMPHOCYTES # BLD AUTO: 2.2 K/UL (ref 1–4.8)
LYMPHOCYTES NFR BLD: 26.4 % (ref 18–48)
MCH RBC QN AUTO: 30.8 PG (ref 27–31)
MCHC RBC AUTO-ENTMCNC: 33.5 G/DL (ref 32–36)
MCV RBC AUTO: 92 FL (ref 82–98)
MONOCYTES # BLD AUTO: 0.8 K/UL (ref 0.3–1)
MONOCYTES NFR BLD: 9.2 % (ref 4–15)
NEUTROPHILS # BLD AUTO: 4.7 K/UL (ref 1.8–7.7)
NEUTROPHILS NFR BLD: 55.1 % (ref 38–73)
NONHDLC SERPL-MCNC: 97 MG/DL
NRBC BLD-RTO: 0 /100 WBC
PLATELET # BLD AUTO: 215 K/UL (ref 150–350)
PMV BLD AUTO: 13.1 FL (ref 9.2–12.9)
POTASSIUM SERPL-SCNC: 3.7 MMOL/L (ref 3.5–5.1)
PROT SERPL-MCNC: 7.1 G/DL (ref 6–8.4)
RBC # BLD AUTO: 5.07 M/UL (ref 4.6–6.2)
SODIUM SERPL-SCNC: 141 MMOL/L (ref 136–145)
TRIGL SERPL-MCNC: 90 MG/DL (ref 30–150)
TSH SERPL DL<=0.005 MIU/L-ACNC: 1.32 UIU/ML (ref 0.4–4)
WBC # BLD AUTO: 8.5 K/UL (ref 3.9–12.7)

## 2021-02-15 PROCEDURE — 85025 COMPLETE CBC W/AUTO DIFF WBC: CPT

## 2021-02-15 PROCEDURE — 84443 ASSAY THYROID STIM HORMONE: CPT

## 2021-02-15 PROCEDURE — 80053 COMPREHEN METABOLIC PANEL: CPT

## 2021-02-15 PROCEDURE — 36415 COLL VENOUS BLD VENIPUNCTURE: CPT | Mod: PO

## 2021-02-15 PROCEDURE — 84153 ASSAY OF PSA TOTAL: CPT

## 2021-02-15 PROCEDURE — 83036 HEMOGLOBIN GLYCOSYLATED A1C: CPT

## 2021-02-15 PROCEDURE — 80061 LIPID PANEL: CPT

## 2021-02-17 ENCOUNTER — OFFICE VISIT (OUTPATIENT)
Dept: FAMILY MEDICINE | Facility: CLINIC | Age: 61
End: 2021-02-17
Payer: MEDICARE

## 2021-02-17 ENCOUNTER — PATIENT MESSAGE (OUTPATIENT)
Dept: FAMILY MEDICINE | Facility: CLINIC | Age: 61
End: 2021-02-17

## 2021-02-17 VITALS
OXYGEN SATURATION: 97 % | HEIGHT: 70 IN | TEMPERATURE: 98 F | DIASTOLIC BLOOD PRESSURE: 80 MMHG | HEART RATE: 75 BPM | SYSTOLIC BLOOD PRESSURE: 120 MMHG | BODY MASS INDEX: 40.97 KG/M2 | WEIGHT: 286.19 LBS

## 2021-02-17 DIAGNOSIS — M17.10 KNEE ARTHROPATHY: ICD-10-CM

## 2021-02-17 DIAGNOSIS — E78.00 PURE HYPERCHOLESTEROLEMIA: ICD-10-CM

## 2021-02-17 DIAGNOSIS — N40.0 BENIGN PROSTATIC HYPERPLASIA, UNSPECIFIED WHETHER LOWER URINARY TRACT SYMPTOMS PRESENT: ICD-10-CM

## 2021-02-17 DIAGNOSIS — Z00.00 ROUTINE MEDICAL EXAM: Primary | ICD-10-CM

## 2021-02-17 DIAGNOSIS — R55 SYNCOPE AND COLLAPSE: ICD-10-CM

## 2021-02-17 DIAGNOSIS — R73.9 HYPERGLYCEMIA: ICD-10-CM

## 2021-02-17 DIAGNOSIS — E66.01 MORBID OBESITY WITH BMI OF 40.0-44.9, ADULT: ICD-10-CM

## 2021-02-17 DIAGNOSIS — Z12.5 SCREENING FOR PROSTATE CANCER: ICD-10-CM

## 2021-02-17 PROCEDURE — 99499 RISK ADDL DX/OHS AUDIT: ICD-10-PCS | Mod: S$GLB,,, | Performed by: INTERNAL MEDICINE

## 2021-02-17 PROCEDURE — 3074F PR MOST RECENT SYSTOLIC BLOOD PRESSURE < 130 MM HG: ICD-10-PCS | Mod: CPTII,S$GLB,, | Performed by: INTERNAL MEDICINE

## 2021-02-17 PROCEDURE — 99499 UNLISTED E&M SERVICE: CPT | Mod: S$GLB,,, | Performed by: INTERNAL MEDICINE

## 2021-02-17 PROCEDURE — 99999 PR PBB SHADOW E&M-EST. PATIENT-LVL III: ICD-10-PCS | Mod: PBBFAC,,, | Performed by: INTERNAL MEDICINE

## 2021-02-17 PROCEDURE — 99214 PR OFFICE/OUTPT VISIT, EST, LEVL IV, 30-39 MIN: ICD-10-PCS | Mod: 25,S$GLB,, | Performed by: INTERNAL MEDICINE

## 2021-02-17 PROCEDURE — 99396 PREV VISIT EST AGE 40-64: CPT | Mod: S$GLB,,, | Performed by: INTERNAL MEDICINE

## 2021-02-17 PROCEDURE — 99396 PR PREVENTIVE VISIT,EST,40-64: ICD-10-PCS | Mod: S$GLB,,, | Performed by: INTERNAL MEDICINE

## 2021-02-17 PROCEDURE — 99214 OFFICE O/P EST MOD 30 MIN: CPT | Mod: 25,S$GLB,, | Performed by: INTERNAL MEDICINE

## 2021-02-17 PROCEDURE — 3008F BODY MASS INDEX DOCD: CPT | Mod: CPTII,S$GLB,, | Performed by: INTERNAL MEDICINE

## 2021-02-17 PROCEDURE — 3079F PR MOST RECENT DIASTOLIC BLOOD PRESSURE 80-89 MM HG: ICD-10-PCS | Mod: CPTII,S$GLB,, | Performed by: INTERNAL MEDICINE

## 2021-02-17 PROCEDURE — 99999 PR PBB SHADOW E&M-EST. PATIENT-LVL III: CPT | Mod: PBBFAC,,, | Performed by: INTERNAL MEDICINE

## 2021-02-17 PROCEDURE — 3008F PR BODY MASS INDEX (BMI) DOCUMENTED: ICD-10-PCS | Mod: CPTII,S$GLB,, | Performed by: INTERNAL MEDICINE

## 2021-02-17 PROCEDURE — 3079F DIAST BP 80-89 MM HG: CPT | Mod: CPTII,S$GLB,, | Performed by: INTERNAL MEDICINE

## 2021-02-17 PROCEDURE — 3074F SYST BP LT 130 MM HG: CPT | Mod: CPTII,S$GLB,, | Performed by: INTERNAL MEDICINE

## 2021-02-17 RX ORDER — ATORVASTATIN CALCIUM 20 MG/1
40 TABLET, FILM COATED ORAL DAILY
Qty: 90 TABLET | Refills: 3 | Status: SHIPPED | OUTPATIENT
Start: 2021-02-17 | End: 2022-03-10

## 2021-02-24 ENCOUNTER — TELEPHONE (OUTPATIENT)
Dept: FAMILY MEDICINE | Facility: CLINIC | Age: 61
End: 2021-02-24

## 2021-11-09 ENCOUNTER — HOSPITAL ENCOUNTER (OUTPATIENT)
Dept: RADIOLOGY | Facility: HOSPITAL | Age: 61
Discharge: HOME | End: 2021-11-09
Attending: ORTHOPAEDIC SURGERY
Payer: MEDICARE

## 2021-11-09 ENCOUNTER — OFFICE VISIT (OUTPATIENT)
Dept: SPORTS MEDICINE | Facility: CLINIC | Age: 61
End: 2021-11-09
Payer: MEDICARE

## 2021-11-09 VITALS
BODY MASS INDEX: 40.8 KG/M2 | DIASTOLIC BLOOD PRESSURE: 77 MMHG | SYSTOLIC BLOOD PRESSURE: 128 MMHG | WEIGHT: 285 LBS | HEART RATE: 79 BPM | HEIGHT: 70 IN

## 2021-11-09 DIAGNOSIS — M25.562 CHRONIC PAIN OF LEFT KNEE: Primary | ICD-10-CM

## 2021-11-09 DIAGNOSIS — M17.12 PRIMARY OSTEOARTHRITIS OF LEFT KNEE: ICD-10-CM

## 2021-11-09 DIAGNOSIS — G89.29 CHRONIC PAIN OF LEFT KNEE: Primary | ICD-10-CM

## 2021-11-09 DIAGNOSIS — M25.562 LEFT KNEE PAIN, UNSPECIFIED CHRONICITY: ICD-10-CM

## 2021-11-09 PROCEDURE — 99999 PR PBB SHADOW E&M-EST. PATIENT-LVL III: CPT | Mod: PBBFAC,,, | Performed by: ORTHOPAEDIC SURGERY

## 2021-11-09 PROCEDURE — 1160F PR REVIEW ALL MEDS BY PRESCRIBER/CLIN PHARMACIST DOCUMENTED: ICD-10-PCS | Mod: CPTII,S$GLB,, | Performed by: ORTHOPAEDIC SURGERY

## 2021-11-09 PROCEDURE — 73564 X-RAY EXAM KNEE 4 OR MORE: CPT | Mod: 26,50,, | Performed by: RADIOLOGY

## 2021-11-09 PROCEDURE — 73564 X-RAY EXAM KNEE 4 OR MORE: CPT | Mod: TC,50

## 2021-11-09 PROCEDURE — 99203 PR OFFICE/OUTPT VISIT, NEW, LEVL III, 30-44 MIN: ICD-10-PCS | Mod: S$GLB,,, | Performed by: ORTHOPAEDIC SURGERY

## 2021-11-09 PROCEDURE — 3008F BODY MASS INDEX DOCD: CPT | Mod: CPTII,S$GLB,, | Performed by: ORTHOPAEDIC SURGERY

## 2021-11-09 PROCEDURE — 99203 OFFICE O/P NEW LOW 30 MIN: CPT | Mod: S$GLB,,, | Performed by: ORTHOPAEDIC SURGERY

## 2021-11-09 PROCEDURE — 3074F PR MOST RECENT SYSTOLIC BLOOD PRESSURE < 130 MM HG: ICD-10-PCS | Mod: CPTII,S$GLB,, | Performed by: ORTHOPAEDIC SURGERY

## 2021-11-09 PROCEDURE — 1159F MED LIST DOCD IN RCRD: CPT | Mod: CPTII,S$GLB,, | Performed by: ORTHOPAEDIC SURGERY

## 2021-11-09 PROCEDURE — 73564 XR KNEE ORTHO BILAT WITH FLEXION: ICD-10-PCS | Mod: 26,50,, | Performed by: RADIOLOGY

## 2021-11-09 PROCEDURE — 1159F PR MEDICATION LIST DOCUMENTED IN MEDICAL RECORD: ICD-10-PCS | Mod: CPTII,S$GLB,, | Performed by: ORTHOPAEDIC SURGERY

## 2021-11-09 PROCEDURE — 3078F DIAST BP <80 MM HG: CPT | Mod: CPTII,S$GLB,, | Performed by: ORTHOPAEDIC SURGERY

## 2021-11-09 PROCEDURE — 99999 PR PBB SHADOW E&M-EST. PATIENT-LVL III: ICD-10-PCS | Mod: PBBFAC,,, | Performed by: ORTHOPAEDIC SURGERY

## 2021-11-09 PROCEDURE — 1160F RVW MEDS BY RX/DR IN RCRD: CPT | Mod: CPTII,S$GLB,, | Performed by: ORTHOPAEDIC SURGERY

## 2021-11-09 PROCEDURE — 3078F PR MOST RECENT DIASTOLIC BLOOD PRESSURE < 80 MM HG: ICD-10-PCS | Mod: CPTII,S$GLB,, | Performed by: ORTHOPAEDIC SURGERY

## 2021-11-09 PROCEDURE — 3074F SYST BP LT 130 MM HG: CPT | Mod: CPTII,S$GLB,, | Performed by: ORTHOPAEDIC SURGERY

## 2021-11-09 PROCEDURE — 3044F HG A1C LEVEL LT 7.0%: CPT | Mod: CPTII,S$GLB,, | Performed by: ORTHOPAEDIC SURGERY

## 2021-11-09 PROCEDURE — 3008F PR BODY MASS INDEX (BMI) DOCUMENTED: ICD-10-PCS | Mod: CPTII,S$GLB,, | Performed by: ORTHOPAEDIC SURGERY

## 2021-11-09 PROCEDURE — 3044F PR MOST RECENT HEMOGLOBIN A1C LEVEL <7.0%: ICD-10-PCS | Mod: CPTII,S$GLB,, | Performed by: ORTHOPAEDIC SURGERY

## 2021-11-10 ENCOUNTER — PATIENT MESSAGE (OUTPATIENT)
Dept: FAMILY MEDICINE | Facility: CLINIC | Age: 61
End: 2021-11-10
Payer: MEDICARE

## 2021-11-12 ENCOUNTER — PATIENT MESSAGE (OUTPATIENT)
Dept: FAMILY MEDICINE | Facility: CLINIC | Age: 61
End: 2021-11-12
Payer: MEDICARE

## 2021-11-16 ENCOUNTER — IMMUNIZATION (OUTPATIENT)
Dept: INTERNAL MEDICINE | Facility: CLINIC | Age: 61
End: 2021-11-16
Payer: MEDICARE

## 2021-11-16 DIAGNOSIS — Z23 NEED FOR VACCINATION: Primary | ICD-10-CM

## 2021-11-16 PROCEDURE — 91303 COVID-19,VECTOR-NR,RS-AD26,PF,0.5 ML DOSE VACCINE (JANSSEN): CPT | Mod: PBBFAC | Performed by: INTERNAL MEDICINE

## 2021-11-16 PROCEDURE — 0034A COVID-19,VECTOR-NR,RS-AD26,PF,0.5 ML DOSE VACCINE (JANSSEN): CPT | Mod: CV19,PBBFAC | Performed by: INTERNAL MEDICINE

## 2021-11-18 ENCOUNTER — CLINICAL SUPPORT (OUTPATIENT)
Dept: FAMILY MEDICINE | Facility: CLINIC | Age: 61
End: 2021-11-18
Payer: MEDICARE

## 2021-11-18 VITALS — TEMPERATURE: 98 F

## 2021-11-18 DIAGNOSIS — Z23 FLU VACCINE NEED: Primary | ICD-10-CM

## 2021-11-18 PROCEDURE — G0008 ADMIN INFLUENZA VIRUS VAC: HCPCS | Mod: S$GLB,,, | Performed by: INTERNAL MEDICINE

## 2021-11-18 PROCEDURE — 99999 PR PBB SHADOW E&M-EST. PATIENT-LVL I: CPT | Mod: PBBFAC,,,

## 2021-11-18 PROCEDURE — G0008 FLU VACCINE (QUAD) GREATER THAN OR EQUAL TO 3YO PRESERVATIVE FREE IM: ICD-10-PCS | Mod: S$GLB,,, | Performed by: INTERNAL MEDICINE

## 2021-11-18 PROCEDURE — 90686 IIV4 VACC NO PRSV 0.5 ML IM: CPT | Mod: S$GLB,,, | Performed by: INTERNAL MEDICINE

## 2021-11-18 PROCEDURE — 99999 PR PBB SHADOW E&M-EST. PATIENT-LVL I: ICD-10-PCS | Mod: PBBFAC,,,

## 2021-11-18 PROCEDURE — 90686 FLU VACCINE (QUAD) GREATER THAN OR EQUAL TO 3YO PRESERVATIVE FREE IM: ICD-10-PCS | Mod: S$GLB,,, | Performed by: INTERNAL MEDICINE

## 2021-11-26 ENCOUNTER — PATIENT OUTREACH (OUTPATIENT)
Dept: ADMINISTRATIVE | Facility: OTHER | Age: 61
End: 2021-11-26
Payer: MEDICARE

## 2021-11-29 ENCOUNTER — OFFICE VISIT (OUTPATIENT)
Dept: SPORTS MEDICINE | Facility: CLINIC | Age: 61
End: 2021-11-29
Payer: MEDICARE

## 2021-11-29 VITALS
DIASTOLIC BLOOD PRESSURE: 80 MMHG | HEART RATE: 80 BPM | SYSTOLIC BLOOD PRESSURE: 128 MMHG | BODY MASS INDEX: 40.8 KG/M2 | WEIGHT: 285 LBS | HEIGHT: 70 IN

## 2021-11-29 DIAGNOSIS — M17.12 PRIMARY OSTEOARTHRITIS OF LEFT KNEE: Primary | ICD-10-CM

## 2021-11-29 DIAGNOSIS — M25.561 CHRONIC PAIN OF BOTH KNEES: ICD-10-CM

## 2021-11-29 DIAGNOSIS — M25.562 CHRONIC PAIN OF BOTH KNEES: ICD-10-CM

## 2021-11-29 DIAGNOSIS — M21.162 ACQUIRED GENU VARUM, LEFT: ICD-10-CM

## 2021-11-29 DIAGNOSIS — G89.29 CHRONIC PAIN OF BOTH KNEES: ICD-10-CM

## 2021-11-29 PROCEDURE — 99214 OFFICE O/P EST MOD 30 MIN: CPT | Mod: HCNC,S$GLB,, | Performed by: PHYSICIAN ASSISTANT

## 2021-11-29 PROCEDURE — 99214 PR OFFICE/OUTPT VISIT, EST, LEVL IV, 30-39 MIN: ICD-10-PCS | Mod: HCNC,S$GLB,, | Performed by: PHYSICIAN ASSISTANT

## 2021-11-29 PROCEDURE — 99999 PR PBB SHADOW E&M-EST. PATIENT-LVL III: CPT | Mod: PBBFAC,,, | Performed by: PHYSICIAN ASSISTANT

## 2021-11-29 PROCEDURE — 99999 PR PBB SHADOW E&M-EST. PATIENT-LVL III: ICD-10-PCS | Mod: PBBFAC,,, | Performed by: PHYSICIAN ASSISTANT

## 2021-12-06 ENCOUNTER — CLINICAL SUPPORT (OUTPATIENT)
Dept: REHABILITATION | Facility: HOSPITAL | Age: 61
End: 2021-12-06
Payer: MEDICARE

## 2021-12-06 DIAGNOSIS — M17.12 PRIMARY OSTEOARTHRITIS OF LEFT KNEE: ICD-10-CM

## 2021-12-06 DIAGNOSIS — Z78.9 DIFFICULTY NAVIGATING STAIRS: ICD-10-CM

## 2021-12-06 DIAGNOSIS — M21.162 ACQUIRED GENU VARUM, LEFT: ICD-10-CM

## 2021-12-06 DIAGNOSIS — R26.9 GAIT ABNORMALITY: ICD-10-CM

## 2021-12-06 DIAGNOSIS — M25.562 CHRONIC PAIN OF BOTH KNEES: ICD-10-CM

## 2021-12-06 DIAGNOSIS — R26.89 IMPAIRMENT OF BALANCE: ICD-10-CM

## 2021-12-06 DIAGNOSIS — G89.29 CHRONIC PAIN OF BOTH KNEES: ICD-10-CM

## 2021-12-06 DIAGNOSIS — M25.561 CHRONIC PAIN OF BOTH KNEES: ICD-10-CM

## 2021-12-06 PROCEDURE — 97161 PT EVAL LOW COMPLEX 20 MIN: CPT | Mod: HCNC

## 2021-12-06 PROCEDURE — 97110 THERAPEUTIC EXERCISES: CPT | Mod: HCNC

## 2021-12-07 ENCOUNTER — PATIENT MESSAGE (OUTPATIENT)
Dept: REHABILITATION | Facility: HOSPITAL | Age: 61
End: 2021-12-07
Payer: MEDICARE

## 2021-12-09 ENCOUNTER — HOSPITAL ENCOUNTER (OUTPATIENT)
Dept: RADIOLOGY | Facility: HOSPITAL | Age: 61
Discharge: HOME | End: 2021-12-09
Attending: PHYSICIAN ASSISTANT
Payer: MEDICARE

## 2021-12-09 DIAGNOSIS — M21.162 ACQUIRED GENU VARUM, LEFT: ICD-10-CM

## 2021-12-09 DIAGNOSIS — M17.12 PRIMARY OSTEOARTHRITIS OF LEFT KNEE: ICD-10-CM

## 2021-12-09 PROCEDURE — 73700 CT KNEE WITHOUT CONTRAST LEFT: ICD-10-PCS | Mod: 26,HCNC,LT, | Performed by: RADIOLOGY

## 2021-12-09 PROCEDURE — 73700 CT LOWER EXTREMITY W/O DYE: CPT | Mod: TC,HCNC,LT

## 2021-12-09 PROCEDURE — 73700 CT LOWER EXTREMITY W/O DYE: CPT | Mod: 26,HCNC,LT, | Performed by: RADIOLOGY

## 2021-12-17 ENCOUNTER — TELEPHONE (OUTPATIENT)
Dept: SPORTS MEDICINE | Facility: CLINIC | Age: 61
End: 2021-12-17
Payer: MEDICARE

## 2021-12-20 ENCOUNTER — TELEPHONE (OUTPATIENT)
Dept: SPORTS MEDICINE | Facility: CLINIC | Age: 61
End: 2021-12-20
Payer: MEDICARE

## 2021-12-20 ENCOUNTER — TELEPHONE (OUTPATIENT)
Dept: PREADMISSION TESTING | Facility: HOSPITAL | Age: 61
End: 2021-12-20
Payer: MEDICARE

## 2021-12-20 ENCOUNTER — CLINICAL SUPPORT (OUTPATIENT)
Dept: REHABILITATION | Facility: HOSPITAL | Age: 61
End: 2021-12-20
Payer: MEDICARE

## 2021-12-20 DIAGNOSIS — Z78.9 DIFFICULTY NAVIGATING STAIRS: ICD-10-CM

## 2021-12-20 DIAGNOSIS — R26.9 GAIT ABNORMALITY: ICD-10-CM

## 2021-12-20 DIAGNOSIS — R26.89 IMPAIRMENT OF BALANCE: ICD-10-CM

## 2021-12-20 PROCEDURE — 97140 MANUAL THERAPY 1/> REGIONS: CPT | Mod: HCNC

## 2021-12-20 PROCEDURE — 97112 NEUROMUSCULAR REEDUCATION: CPT | Mod: HCNC

## 2021-12-20 PROCEDURE — 97116 GAIT TRAINING THERAPY: CPT | Mod: HCNC

## 2021-12-27 ENCOUNTER — PATIENT MESSAGE (OUTPATIENT)
Dept: SPORTS MEDICINE | Facility: CLINIC | Age: 61
End: 2021-12-27
Payer: MEDICARE

## 2021-12-27 ENCOUNTER — CLINICAL SUPPORT (OUTPATIENT)
Dept: REHABILITATION | Facility: HOSPITAL | Age: 61
End: 2021-12-27
Payer: MEDICARE

## 2021-12-27 ENCOUNTER — PATIENT MESSAGE (OUTPATIENT)
Dept: FAMILY MEDICINE | Facility: CLINIC | Age: 61
End: 2021-12-27
Payer: MEDICARE

## 2021-12-27 DIAGNOSIS — Z78.9 DIFFICULTY NAVIGATING STAIRS: ICD-10-CM

## 2021-12-27 DIAGNOSIS — R26.9 GAIT ABNORMALITY: ICD-10-CM

## 2021-12-27 DIAGNOSIS — R26.89 IMPAIRMENT OF BALANCE: ICD-10-CM

## 2021-12-27 PROCEDURE — 97140 MANUAL THERAPY 1/> REGIONS: CPT | Mod: HCNC

## 2021-12-27 PROCEDURE — 97112 NEUROMUSCULAR REEDUCATION: CPT | Mod: HCNC

## 2022-01-03 ENCOUNTER — CLINICAL SUPPORT (OUTPATIENT)
Dept: REHABILITATION | Facility: HOSPITAL | Age: 62
End: 2022-01-03
Payer: MEDICARE

## 2022-01-03 DIAGNOSIS — R26.89 IMPAIRMENT OF BALANCE: ICD-10-CM

## 2022-01-03 DIAGNOSIS — R26.9 GAIT ABNORMALITY: ICD-10-CM

## 2022-01-03 DIAGNOSIS — Z78.9 DIFFICULTY NAVIGATING STAIRS: ICD-10-CM

## 2022-01-03 PROCEDURE — 97112 NEUROMUSCULAR REEDUCATION: CPT | Mod: HCNC

## 2022-01-03 PROCEDURE — 97110 THERAPEUTIC EXERCISES: CPT | Mod: HCNC

## 2022-01-03 PROCEDURE — 97140 MANUAL THERAPY 1/> REGIONS: CPT | Mod: HCNC

## 2022-01-03 NOTE — PROGRESS NOTES
Physical Therapy Daily Treatment Note     Name: Black Hastingseron  Clinic Number: [id number]    Therapy Diagnosis:   Encounter Diagnoses   Name Primary?    Impairment of balance     Difficulty navigating stairs     Gait abnormality      Physician: Noman Jeffery, *    Visit Date: 1/3/2022  Physician Orders: PT Eval and Treat  Medical Diagnosis from Referral:   M17.12 (ICD-10-CM) - Primary osteoarthritis of left knee   M21.162 (ICD-10-CM) - Acquired genu varum, left   M25.561,M25.562,G89.29 (ICD-10-CM) - Chronic pain of both knees      Evaluation Date: 12/6/2021  Authorization Period Expiration: 06/20/2020  Plan of Care Expiration: 04/01/2022  Visit # / Visits authorized: 1 / 30 (4 total)    Time In: 0904  Time Out: 1012  Total Billable Time: 54 minutes    Precautions: Standard and pacemaker    Subjective     Pt reports: feeling better and stronger today with less c/o knee pain with ambulation. He reports continuing to complete his HEP and feels stronger each day. He reports being pleased with his progress through therapy.     He was compliant with home exercise program.  Response to previous treatment: N/A  Functional change: N/A    Pain: Not verbalized 4/10  Location: (L) Knee     Objective     Daily Measurements: NT      Daily Treatment       Black received therapeutic exercises to develop strength, endurance, ROM, flexibility and core stabilization for 18 minutes including:  Bike completed for 10 min to increase ROM, endurance and decrease pain to improve tolerance to ADLs and age related activities.   DL/SL Shuttle press 3 x 8 with TKE emphasis 4 bands/ 2 bands       Black received the following manual therapy techniques: were applied to the: L Knee for 10 minutes, including:  Accessory Tibial ER Mob  PA Tibial Accessory glide Grd III-IV      Black participated in neuromuscular re-education activities to improve: Balance, Coordination, Kinesthetic, Sense, Proprioception and Motor Control for 34  minutes. The following activities were included:  Supine SLR with 3-5s holds  Supine frog bridges with BTB x20 with 3-5s holds   Standing TKE Orange Powerband x20 3-5s holds  Hip hinges 2 x 5       Home Exercises and Patient Education Provided     Education provided:   - Reviewed HEP  -Education on importance of rest/work cycles to decrease knee pain    Written Home Exercises Provided: yes.  Exercises were reviewed and Black was able to demonstrate them prior to the end of the session.  Black demonstrated good  understanding of the education provided.     See EMR under patient instructions for exercises given.     Assessment     Pt demonstrated improvements in knee extension with AROM able to achieve increased knee extension with less c/o L knee pain. He continues to show improvements in neuromotor control of his quad demonstrated with a normalized gait pattern with decreased episodes of knee buckling. He continues to present with decreased L quad strength compared to R demonstrated during SL shuttle press. He reports feeling great after session today with no adverse effects.     Black Is progressing well towards his goals.     Pt will continue to benefit from skilled outpatient physical therapy to address the deficits listed in the problem list box on initial evaluation, provide pt/family education and to maximize pt's level of independence in the home and community environment. Pt prognosis is Good.     Pt's spiritual, cultural and educational needs considered and pt agreeable to plan of care and goals.    Anticipated barriers to physical therapy: None    Goals:  Short Term Goals: 6 weeks  1. Pt will be compliant with HEP 50% of prescribed amount.   2. The pt to demo improvement in FOTO score to meet or exceed MCID indicating a clinically meaningful change.   3.  Pt will demo improved squat mechanics which will include initiation with hip hinge and maintained hip ER activation.  4. Pt will demo ability to  "perform DL bridge with glute dominant pattern and without compensation.   5. Pt will improve hip abd strength by 1/2 muscle grade.      Long Term Goals: 16 weeks   1. Pt will be compliant with % of prescribed amount.   2. Pt will report ability to walk x20 minutes with NPRS <2/10.   3. Pt will demo ability to perform a functional DL squat to 24" height w/NPRS <3/10.  4. Pt will demo improved hip extension and TKE on L knee with gait.       Plan     Pt to follow up in 1 week. Assess for CKC progression next visit.     Co-treated with Candi Moon, SPT-3    I certify that I was present in the room directing the student in service delivery and guiding them using my skilled judgment. As the co-signing therapist I have reviewed the students documentation and am responsible for the treatment, assessment, and plan.     Deshawn Cintron, PT , DPT    "

## 2022-01-04 DIAGNOSIS — M21.161 GENU VARUM OF BOTH LOWER EXTREMITIES: ICD-10-CM

## 2022-01-04 DIAGNOSIS — M17.12 PRIMARY OSTEOARTHRITIS OF LEFT KNEE: Primary | ICD-10-CM

## 2022-01-04 DIAGNOSIS — M21.162 GENU VARUM OF BOTH LOWER EXTREMITIES: ICD-10-CM

## 2022-01-04 DIAGNOSIS — Z01.818 PREOP TESTING: ICD-10-CM

## 2022-01-10 ENCOUNTER — CLINICAL SUPPORT (OUTPATIENT)
Dept: REHABILITATION | Facility: HOSPITAL | Age: 62
End: 2022-01-10
Payer: MEDICARE

## 2022-01-10 DIAGNOSIS — R26.89 IMPAIRMENT OF BALANCE: ICD-10-CM

## 2022-01-10 DIAGNOSIS — Z78.9 DIFFICULTY NAVIGATING STAIRS: ICD-10-CM

## 2022-01-10 DIAGNOSIS — R26.9 GAIT ABNORMALITY: ICD-10-CM

## 2022-01-10 PROCEDURE — 97112 NEUROMUSCULAR REEDUCATION: CPT | Mod: HCNC

## 2022-01-10 PROCEDURE — 97110 THERAPEUTIC EXERCISES: CPT | Mod: HCNC

## 2022-01-10 PROCEDURE — 97140 MANUAL THERAPY 1/> REGIONS: CPT | Mod: HCNC

## 2022-01-10 PROCEDURE — 97116 GAIT TRAINING THERAPY: CPT | Mod: HCNC

## 2022-01-10 NOTE — PROGRESS NOTES
Physical Therapy Daily Treatment Note     Name: Black Preciado  Clinic Number: [id number]    Therapy Diagnosis:   Encounter Diagnoses   Name Primary?    Impairment of balance     Difficulty navigating stairs     Gait abnormality      Physician: Noman Jeffery, *    Visit Date: 1/10/2022  Physician Orders: PT Eval and Treat  Medical Diagnosis from Referral:   M17.12 (ICD-10-CM) - Primary osteoarthritis of left knee   M21.162 (ICD-10-CM) - Acquired genu varum, left   M25.561,M25.562,G89.29 (ICD-10-CM) - Chronic pain of both knees      Evaluation Date: 12/6/2021  Authorization Period Expiration: 06/20/2020  Plan of Care Expiration: 04/01/2022  Visit # / Visits authorized: 2 / 30 (4 total)    Time In: 0902  Time Out: 1010  Total Billable Time: 58 minutes    Precautions: Standard and pacemaker    Subjective     Pt reports: feeling an increase in knee pain, particularly along the medial joint line. He report feeling sore after last treatment session to which he took 3-4 days off of his HEP secondary to muscle soreness. He states his soreness has subsided, but he feels the knee pain more.     He was compliant with home exercise program.  Response to previous treatment: N/A  Functional change: N/A    Pain: Not verbalized 5/10  Location: (L) Knee     Objective     Daily Measurements: NT      Daily Treatment       Black received therapeutic exercises to develop strength, endurance, ROM, flexibility and core stabilization for 19 minutes including:  Bike completed for 8 min to increase ROM, endurance and decrease pain to improve tolerance to ADLs and age related activities.  Long seated HS stretch 4x 30 seconds   Long seated GS complex stretch 4x 30 seconds       Black received the following manual therapy techniques: were applied to the: L Knee for 10 minutes, including:  Accessory Tibial ER Mob  PA Tibial Accessory glide Grd III-IV      Black participated in neuromuscular re-education activities to improve:  Balance, Coordination, Kinesthetic, Sense, Proprioception and Motor Control for 21 minutes. The following activities were included:  Supine SLR with 5s holds  Standing TKE Orange Powerband x20 3-5s holds  Standing partial step through with emphasis on TKE and quad control     /Black participated in gait training to improve functional mobility and safety for 12  minutes, including:  Overground pain free gait training with emphasis on TKE        Home Exercises and Patient Education Provided     Education provided:   - Reviewed HEP  -Education on importance of rest/work cycles to decrease knee pain    Written Home Exercises Provided: yes.  Exercises were reviewed and Black was able to demonstrate them prior to the end of the session.  Black demonstrated good  understanding of the education provided.     See EMR under patient instructions for exercises given.     Assessment     Demonstrated improvements with knee extension following manual and motor control re-education with less c/o knee pain. He showed significant improvements in gait mechanics following neuro re-ed able to ambulate with less aberrant movements. He reports feeling better than when he came in following today's treatment session. He reports enjoying the stretches added today.     Black Is progressing well towards his goals.     Pt will continue to benefit from skilled outpatient physical therapy to address the deficits listed in the problem list box on initial evaluation, provide pt/family education and to maximize pt's level of independence in the home and community environment. Pt prognosis is Good.     Pt's spiritual, cultural and educational needs considered and pt agreeable to plan of care and goals.    Anticipated barriers to physical therapy: None    Goals:  Short Term Goals: 6 weeks  1. Pt will be compliant with HEP 50% of prescribed amount.   2. The pt to demo improvement in FOTO score to meet or exceed MCID indicating a clinically  "meaningful change.   3.  Pt will demo improved squat mechanics which will include initiation with hip hinge and maintained hip ER activation.  4. Pt will demo ability to perform DL bridge with glute dominant pattern and without compensation.   5. Pt will improve hip abd strength by 1/2 muscle grade.      Long Term Goals: 16 weeks   1. Pt will be compliant with % of prescribed amount.   2. Pt will report ability to walk x20 minutes with NPRS <2/10.   3. Pt will demo ability to perform a functional DL squat to 24" height w/NPRS <3/10.  4. Pt will demo improved hip extension and TKE on L knee with gait.       Plan     Pt to follow up in 1 week. Assess for CKC progression next visit.     Co-treated with Candi Moon, SPT-3    I certify that I was present in the room directing the student in service delivery and guiding them using my skilled judgment. As the co-signing therapist I have reviewed the students documentation and am responsible for the treatment, assessment, and plan.     Deshawn Cintron, PT , DPT    "

## 2022-01-14 ENCOUNTER — TELEPHONE (OUTPATIENT)
Dept: SPORTS MEDICINE | Facility: CLINIC | Age: 62
End: 2022-01-14
Payer: MEDICARE

## 2022-01-14 ENCOUNTER — PATIENT MESSAGE (OUTPATIENT)
Dept: REHABILITATION | Facility: HOSPITAL | Age: 62
End: 2022-01-14
Payer: MEDICARE

## 2022-01-14 NOTE — TELEPHONE ENCOUNTER
Spoke with patient in regards to his surgery that was scheduled for [facility] on . He will now be going on 3/18 as Dr. Trevino has had some schedule changes. Adjusted all appointments for patient and will send letter to preop center at [facility]. Patient noted he was in [place] for the  of his mother in law and will get with Dr. Brewer upon his return home to reschedule that appointment as well. Patient noted all understanding and was very appreciative of call.     Mary Salguero   Clinical Assistant to Dr. Estela Trevino

## 2022-01-18 ENCOUNTER — TELEPHONE (OUTPATIENT)
Dept: PREADMISSION TESTING | Facility: HOSPITAL | Age: 62
End: 2022-01-18
Payer: MEDICARE

## 2022-01-18 NOTE — TELEPHONE ENCOUNTER
----- Message from Mary Salguero sent at 1/14/2022 10:47 AM CST -----  Regarding: PCP clearance for [facility] patient-Dr. Estela Trevino  Hi,      The attached patient is scheduled for surgery with Dr. Trevino at [facility] on 3/18. He was scheduled for February but we have had a schedule change. Please reach out to the patient to get him rescheduled for a preoperative clearance dated within 30 days of surgery. Thank you so much!!      Mary Salguero   Clinical Assistant to Dr. Estela Trevino

## 2022-01-24 ENCOUNTER — PATIENT MESSAGE (OUTPATIENT)
Dept: REHABILITATION | Facility: HOSPITAL | Age: 62
End: 2022-01-24
Payer: MEDICARE

## 2022-01-24 ENCOUNTER — PATIENT MESSAGE (OUTPATIENT)
Dept: ADMINISTRATIVE | Facility: OTHER | Age: 62
End: 2022-01-24
Payer: MEDICARE

## 2022-01-26 ENCOUNTER — OFFICE VISIT (OUTPATIENT)
Dept: FAMILY MEDICINE | Facility: CLINIC | Age: 62
End: 2022-01-26
Payer: MEDICARE

## 2022-01-26 VITALS
HEIGHT: 70 IN | OXYGEN SATURATION: 97 % | SYSTOLIC BLOOD PRESSURE: 118 MMHG | WEIGHT: 283.5 LBS | BODY MASS INDEX: 40.59 KG/M2 | HEART RATE: 72 BPM | DIASTOLIC BLOOD PRESSURE: 82 MMHG | TEMPERATURE: 98 F

## 2022-01-26 DIAGNOSIS — R00.1 BRADYCARDIA: ICD-10-CM

## 2022-01-26 DIAGNOSIS — G89.29 CHRONIC PAIN OF BOTH KNEES: ICD-10-CM

## 2022-01-26 DIAGNOSIS — I47.29 NSVT (NONSUSTAINED VENTRICULAR TACHYCARDIA): ICD-10-CM

## 2022-01-26 DIAGNOSIS — Z12.11 SCREENING FOR COLORECTAL CANCER: ICD-10-CM

## 2022-01-26 DIAGNOSIS — E66.01 MORBID OBESITY WITH BMI OF 40.0-44.9, ADULT: ICD-10-CM

## 2022-01-26 DIAGNOSIS — R35.0 URINARY FREQUENCY: ICD-10-CM

## 2022-01-26 DIAGNOSIS — Z12.5 SCREENING FOR PROSTATE CANCER: ICD-10-CM

## 2022-01-26 DIAGNOSIS — R55 SYNCOPE AND COLLAPSE: ICD-10-CM

## 2022-01-26 DIAGNOSIS — Z12.12 SCREENING FOR COLORECTAL CANCER: ICD-10-CM

## 2022-01-26 DIAGNOSIS — N40.0 BENIGN PROSTATIC HYPERPLASIA, UNSPECIFIED WHETHER LOWER URINARY TRACT SYMPTOMS PRESENT: ICD-10-CM

## 2022-01-26 DIAGNOSIS — R55 VASOVAGAL SYNCOPE: ICD-10-CM

## 2022-01-26 DIAGNOSIS — M25.562 CHRONIC PAIN OF BOTH KNEES: ICD-10-CM

## 2022-01-26 DIAGNOSIS — M25.561 CHRONIC PAIN OF BOTH KNEES: ICD-10-CM

## 2022-01-26 DIAGNOSIS — Z00.00 ROUTINE MEDICAL EXAM: Primary | ICD-10-CM

## 2022-01-26 DIAGNOSIS — R73.9 HYPERGLYCEMIA: ICD-10-CM

## 2022-01-26 DIAGNOSIS — E78.00 PURE HYPERCHOLESTEROLEMIA: ICD-10-CM

## 2022-01-26 DIAGNOSIS — Z01.818 PREOPERATIVE EXAMINATION: ICD-10-CM

## 2022-01-26 PROCEDURE — 99499 RISK ADDL DX/OHS AUDIT: ICD-10-PCS | Mod: S$GLB,,, | Performed by: INTERNAL MEDICINE

## 2022-01-26 PROCEDURE — 3008F BODY MASS INDEX DOCD: CPT | Mod: HCNC,CPTII,S$GLB, | Performed by: INTERNAL MEDICINE

## 2022-01-26 PROCEDURE — 1159F PR MEDICATION LIST DOCUMENTED IN MEDICAL RECORD: ICD-10-PCS | Mod: HCNC,CPTII,S$GLB, | Performed by: INTERNAL MEDICINE

## 2022-01-26 PROCEDURE — 3008F PR BODY MASS INDEX (BMI) DOCUMENTED: ICD-10-PCS | Mod: HCNC,CPTII,S$GLB, | Performed by: INTERNAL MEDICINE

## 2022-01-26 PROCEDURE — 99499 UNLISTED E&M SERVICE: CPT | Mod: S$GLB,,, | Performed by: INTERNAL MEDICINE

## 2022-01-26 PROCEDURE — 99396 PREV VISIT EST AGE 40-64: CPT | Mod: HCNC,S$GLB,, | Performed by: INTERNAL MEDICINE

## 2022-01-26 PROCEDURE — 1159F MED LIST DOCD IN RCRD: CPT | Mod: HCNC,CPTII,S$GLB, | Performed by: INTERNAL MEDICINE

## 2022-01-26 PROCEDURE — 99214 OFFICE O/P EST MOD 30 MIN: CPT | Mod: 25,HCNC,S$GLB, | Performed by: INTERNAL MEDICINE

## 2022-01-26 PROCEDURE — 99999 PR PBB SHADOW E&M-EST. PATIENT-LVL IV: CPT | Mod: PBBFAC,HCNC,, | Performed by: INTERNAL MEDICINE

## 2022-01-26 PROCEDURE — 99214 PR OFFICE/OUTPT VISIT, EST, LEVL IV, 30-39 MIN: ICD-10-PCS | Mod: 25,HCNC,S$GLB, | Performed by: INTERNAL MEDICINE

## 2022-01-26 PROCEDURE — 99999 PR PBB SHADOW E&M-EST. PATIENT-LVL IV: ICD-10-PCS | Mod: PBBFAC,HCNC,, | Performed by: INTERNAL MEDICINE

## 2022-01-26 PROCEDURE — 99396 PR PREVENTIVE VISIT,EST,40-64: ICD-10-PCS | Mod: HCNC,S$GLB,, | Performed by: INTERNAL MEDICINE

## 2022-01-26 RX ORDER — METHYLPREDNISOLONE 4 MG/1
TABLET ORAL
COMMUNITY
Start: 2021-12-02 | End: 2022-02-22 | Stop reason: ALTCHOICE

## 2022-01-26 RX ORDER — FLUTICASONE PROPIONATE 50 MCG
SPRAY, SUSPENSION (ML) NASAL DAILY
COMMUNITY
Start: 2021-12-02

## 2022-01-26 RX ORDER — EPINEPHRINE 0.3 MG/.3ML
INJECTION SUBCUTANEOUS
COMMUNITY
Start: 2021-12-28

## 2022-01-26 RX ORDER — INFLUENZA VIRUS VACCINE 15; 15; 15; 15 UG/.5ML; UG/.5ML; UG/.5ML; UG/.5ML
SUSPENSION INTRAMUSCULAR
COMMUNITY
Start: 2021-11-18 | End: 2022-02-22 | Stop reason: ALTCHOICE

## 2022-01-26 RX ORDER — AZELASTINE 1 MG/ML
SPRAY, METERED NASAL 2 TIMES DAILY PRN
COMMUNITY
Start: 2021-12-02

## 2022-01-26 RX ORDER — DOXYCYCLINE 100 MG/1
100 CAPSULE ORAL DAILY
COMMUNITY
Start: 2021-12-02 | End: 2022-02-22

## 2022-01-26 RX ORDER — CHLORTHALIDONE 50 MG/1
1 TABLET ORAL DAILY
COMMUNITY
Start: 2021-05-25 | End: 2023-03-09

## 2022-01-26 RX ORDER — ATORVASTATIN CALCIUM 40 MG/1
1 TABLET, FILM COATED ORAL DAILY
COMMUNITY
Start: 2022-01-20 | End: 2022-02-22 | Stop reason: SDUPTHER

## 2022-01-26 RX ORDER — ATORVASTATIN CALCIUM 40 MG/1
80 TABLET, FILM COATED ORAL DAILY
COMMUNITY
Start: 2021-10-26
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
## 2022-02-01 ENCOUNTER — CLINICAL SUPPORT (OUTPATIENT)
Dept: REHABILITATION | Facility: HOSPITAL | Age: 62
End: 2022-02-01
Payer: MEDICARE

## 2022-02-01 DIAGNOSIS — Z78.9 DIFFICULTY NAVIGATING STAIRS: ICD-10-CM

## 2022-02-01 DIAGNOSIS — R26.89 IMPAIRMENT OF BALANCE: ICD-10-CM

## 2022-02-01 DIAGNOSIS — R26.9 GAIT ABNORMALITY: ICD-10-CM

## 2022-02-01 PROCEDURE — 97110 THERAPEUTIC EXERCISES: CPT | Mod: HCNC

## 2022-02-01 PROCEDURE — 97140 MANUAL THERAPY 1/> REGIONS: CPT | Mod: HCNC

## 2022-02-01 PROCEDURE — 97112 NEUROMUSCULAR REEDUCATION: CPT | Mod: HCNC

## 2022-02-01 NOTE — PROGRESS NOTES
"  Physical Therapy Daily Treatment Note     Name: Black Preciado  Clinic Number: [id number]    Therapy Diagnosis:   No diagnosis found.  Physician: Noman Jeffery, *    Visit Date: 2/1/2022  Physician Orders: PT Eval and Treat  Medical Diagnosis from Referral:   M17.12 (ICD-10-CM) - Primary osteoarthritis of left knee   M21.162 (ICD-10-CM) - Acquired genu varum, left   M25.561,M25.562,G89.29 (ICD-10-CM) - Chronic pain of both knees      Evaluation Date: 12/6/2021  Authorization Period Expiration: 06/20/2020  Plan of Care Expiration: 04/01/2022  Visit # / Visits authorized: 3 / 30 (6 total)    Time In: 0904  Time Out: 1010  Total Billable Time: 40 minutes    Precautions: Standard and pacemaker    Subjective     Pt reports: feeling an increase in knee pain, particularly along the medial joint line. He reports feeling an increase in pain following a long road-trip taken over the weekend.     He was compliant with home exercise program.  Response to previous treatment: N/A  Functional change: N/A    Pain: Not verbalized 5/10  Location: (L) Knee     Objective     Daily Measurements: NT      Daily Treatment       Black received therapeutic exercises to develop strength, endurance, ROM, flexibility and core stabilization for 17 minutes including:  Bike completed for 8 min to increase ROM, endurance and decrease pain to improve tolerance to ADLs and age related activities.  Long seated HS stretch 4x 30 seconds   Long seated GS complex stretch 4x 30 seconds       Black received the following manual therapy techniques: were applied to the: L Knee for 10 minutes, including:  Accessory Tibial ER Mob  PA Tibial Accessory glide Grd III-IV      Black participated in neuromuscular re-education activities to improve: Balance, Coordination, Kinesthetic, Sense, Proprioception and Motor Control for 21 minutes. The following activities were included:  Glute bridges w/ BTB 2x10x5"  Hip hinge w/dowel x15  Isometric squat to " "24" box w/ 5# coutner-balance 2x10x5"             Home Exercises and Patient Education Provided     Education provided:   - Reviewed HEP  -Education on importance of rest/work cycles to decrease knee pain    Written Home Exercises Provided: yes.  Exercises were reviewed and Black was able to demonstrate them prior to the end of the session.  Black demonstrated good  understanding of the education provided.     See EMR under patient instructions for exercises given.     Assessment     Black continues to demonstrate improvements maintaining knee extension secondary to compliance with HEP. He continues to demonstrate difficulty during hip hinge to initiate with his hips rather than low back, but was able to maintain appropriate mechanics with isometric squats with cueing. He tolerated progressions in functional activities today reporting tired after session.     Black Is progressing well towards his goals.     Pt will continue to benefit from skilled outpatient physical therapy to address the deficits listed in the problem list box on initial evaluation, provide pt/family education and to maximize pt's level of independence in the home and community environment. Pt prognosis is Good.     Pt's spiritual, cultural and educational needs considered and pt agreeable to plan of care and goals.    Anticipated barriers to physical therapy: None    Goals:  Short Term Goals: 6 weeks  1. Pt will be compliant with HEP 50% of prescribed amount.   2. The pt to demo improvement in FOTO score to meet or exceed MCID indicating a clinically meaningful change.   3.  Pt will demo improved squat mechanics which will include initiation with hip hinge and maintained hip ER activation.  4. Pt will demo ability to perform DL bridge with glute dominant pattern and without compensation.   5. Pt will improve hip abd strength by 1/2 muscle grade.      Long Term Goals: 16 weeks   1. Pt will be compliant with % of prescribed amount. " "  2. Pt will report ability to walk x20 minutes with NPRS <2/10.   3. Pt will demo ability to perform a functional DL squat to 24" height w/NPRS <3/10.  4. Pt will demo improved hip extension and TKE on L knee with gait.       Plan     Pt to follow up in 1 week. Assess for CKC progression next visit.     Co-treated with Candi Moon, SPT-3    I certify that I was present in the room directing the student in service delivery and guiding them using my skilled judgment. As the co-signing therapist I have reviewed the students documentation and am responsible for the treatment, assessment, and plan.     Deshawn Cintron, PT , DPT    "

## 2022-02-03 ENCOUNTER — TELEPHONE (OUTPATIENT)
Dept: FAMILY MEDICINE | Facility: CLINIC | Age: 62
End: 2022-02-03
Payer: MEDICARE

## 2022-02-03 NOTE — TELEPHONE ENCOUNTER
Attempted to contact the patient regarding a referral to Urology, unable to leave a message. Letter mailed

## 2022-02-08 ENCOUNTER — CLINICAL SUPPORT (OUTPATIENT)
Dept: REHABILITATION | Facility: HOSPITAL | Age: 62
End: 2022-02-08
Payer: MEDICARE

## 2022-02-08 DIAGNOSIS — R26.89 IMPAIRMENT OF BALANCE: ICD-10-CM

## 2022-02-08 DIAGNOSIS — R26.9 GAIT ABNORMALITY: ICD-10-CM

## 2022-02-08 DIAGNOSIS — Z78.9 DIFFICULTY NAVIGATING STAIRS: ICD-10-CM

## 2022-02-08 PROCEDURE — 97112 NEUROMUSCULAR REEDUCATION: CPT | Mod: HCNC

## 2022-02-08 PROCEDURE — 97140 MANUAL THERAPY 1/> REGIONS: CPT | Mod: HCNC

## 2022-02-08 PROCEDURE — 97110 THERAPEUTIC EXERCISES: CPT | Mod: HCNC

## 2022-02-08 NOTE — PROGRESS NOTES
Physical Therapy Daily Treatment Note     Name: Black Preciado  Clinic Number: [id number]    Therapy Diagnosis:   Encounter Diagnoses   Name Primary?    Impairment of balance     Difficulty navigating stairs     Gait abnormality      Physician: Noman Jeffery, *    Visit Date: 2/8/2022  Physician Orders: PT Eval and Treat  Medical Diagnosis from Referral:   M17.12 (ICD-10-CM) - Primary osteoarthritis of left knee   M21.162 (ICD-10-CM) - Acquired genu varum, left   M25.561,M25.562,G89.29 (ICD-10-CM) - Chronic pain of both knees      Evaluation Date: 12/6/2021  Authorization Period Expiration: 06/20/2020  Plan of Care Expiration: 04/01/2022  Visit # / Visits authorized: 4 / 30 (6 total)    Time In: 0931 (Pt arrived late)  Time Out: 1032   Total Billable Time: 58 minutes    Precautions: Standard and pacemaker    Subjective     Pt reports: feeling much better than previous session. Over the week and weekend he was able to perform ADLs without an increase in pain.     He was compliant with home exercise program.  Response to previous treatment: N/A  Functional change: N/A    Pain: Not verbalized 5/10  Location: (L) Knee     Objective     Daily Measurements: NT      Daily Treatment       Black received therapeutic exercises to develop strength, endurance, ROM, flexibility and core stabilization for 30 minutes including:  Bike completed for 8 min to increase ROM, endurance and decrease pain to improve tolerance to ADLs and age related activities.  YTB side stepping at bar 5 laps   Long seated HS stretch 4x 30 seconds   Long seated GS complex stretch 4x 30 seconds       Black received the following manual therapy techniques: were applied to the: L Knee for 8 minutes, including:  Accessory Tibial ER Mob  PA Tibial Accessory glide Grd III-IV      Black participated in neuromuscular re-education activities to improve: Balance, Coordination, Kinesthetic, Sense, Proprioception and Motor Control for 20 minutes.  "The following activities were included:  Glute bridges w/ RTB and 26# KB 2x8x5"  Isometric squat to 24" box w/ 5# coutner-balance 2x10x5"       Home Exercises and Patient Education Provided     Education provided:   - Reviewed HEP  -Education on importance of rest/work cycles to decrease knee pain    Written Home Exercises Provided: yes.  Exercises were reviewed and Black was able to demonstrate them prior to the end of the session.  Black demonstrated good  understanding of the education provided.     See EMR under patient instructions for exercises given.     Assessment     Black continues to demonstrate excellent carryover with maintaining knee extension and quad control. He demonstrated improvements with glute bridge and tolerated progressions with weight today with no adverse effects. He required cueing during isometric squats to initiate at the hip, but quickly corrected with repetition. He reported some pain initially with functional banded side steps today, but pain subsided with increased reps. FOTO score exceeds MCID indicating meaningful functional change.     Black Is progressing well towards his goals.     Pt will continue to benefit from skilled outpatient physical therapy to address the deficits listed in the problem list box on initial evaluation, provide pt/family education and to maximize pt's level of independence in the home and community environment. Pt prognosis is Good.     Pt's spiritual, cultural and educational needs considered and pt agreeable to plan of care and goals.    Anticipated barriers to physical therapy: None    Goals:  Short Term Goals: 6 weeks  1. Pt will be compliant with HEP 50% of prescribed amount. (Met)  2. The pt to demo improvement in FOTO score to meet or exceed MCID indicating a clinically meaningful change. (Met)  3.  Pt will demo improved squat mechanics which will include initiation with hip hinge and maintained hip ER activation. (Met)  4. Pt will demo " "ability to perform DL bridge with glute dominant pattern and without compensation. (Met)  5. Pt will improve hip abd strength by 1/2 muscle grade.      Long Term Goals: 16 weeks   1. Pt will be compliant with % of prescribed amount.   2. Pt will report ability to walk x20 minutes with NPRS <2/10.   3. Pt will demo ability to perform a functional DL squat to 24" height w/NPRS <3/10.  4. Pt will demo improved hip extension and TKE on L knee with gait.       Plan     Black will follow up in 1 week. Assess functional movements squats and side stepping and progression functional activities as appropriate. Continue to assess appropriate gait mechanics.     Co-treated with Candi Moon, SPT-3    I certify that I was present in the room directing the student in service delivery and guiding them using my skilled judgment. As the co-signing therapist I have reviewed the students documentation and am responsible for the treatment, assessment, and plan.     Deshawn Cintron, PT , DPT    "

## 2022-02-22 DIAGNOSIS — Z01.818 PRE-OP TESTING: Primary | ICD-10-CM

## 2022-02-22 DIAGNOSIS — M79.605 PAIN OF LEFT LOWER EXTREMITY: ICD-10-CM

## 2022-02-22 RX ORDER — MULTIVITAMIN
1 TABLET ORAL DAILY
COMMUNITY

## 2022-02-22 RX ORDER — HYDROCODONE BITARTRATE AND ACETAMINOPHEN 7.5; 325 MG/1; MG/1
1 TABLET ORAL EVERY 6 HOURS PRN
COMMUNITY
End: 2023-03-09

## 2022-02-22 RX ORDER — SULFAMETHOXAZOLE AND TRIMETHOPRIM 800; 160 MG/1; MG/1
1 TABLET ORAL
COMMUNITY
End: 2023-03-09

## 2022-02-22 NOTE — PRE-PROCEDURE INSTRUCTIONS
Chart review; triage plan initiated.  Phone contact-medication reconciliation completed; instructed to hold vitamins, supplements and NSAIDs for one week prior to surgery. Informed that the remaining medication instructions will be provided at the POC visit. Pt verbalized understanding.

## 2022-02-23 NOTE — ANESTHESIA PAT ROS NOTE
02/22/2022  Black Preciado is a 61 y.o., male.      Pre-op Assessment          Review of Systems         Anesthesia Assessment: Preoperative EQUATION    Planned Procedure: Procedure(s) (LRB):  ARTHROPLASTY, KNEE (Left)  SURGICAL PROCUREMENT, BONE GRAFT (Left)  Requested Anesthesia Type:General  Surgeon: Estela Trevino MD  Service: Orthopedics  Known or anticipated Date of Surgery:3/18/2022    Surgeon notes: reviewed    Previous anesthesia records:colonoscopy 2014   Pt reports sinus surgery on 2/21/22 at outside facility with no report of problems with anesthesia.    Last PCP note: within [facility] , Dr Ehrensing 1/26/22 for annual exam and pre-op clearance for sinus surgery  Subspecialty notes: Cardiology: EP Dr Villeda 2/7/20; outside Cardiology notes in Care Everywhere: RICCI Kaur 11/30/21    Pt stopped aspirin prior to sinus surgery on 2/21/22 and will resume in 4 days; pt completed Medrol dosepack on 2/21/22.    Other important co-morbidities: HLD, HTN, Obesity, AUREA and NSVT, SSS s/p [employer]onic Pacemaker (11/4/20), BPH (POUR risk)      Tests already available:  EKG 3/2/20; TTE 2/7/20               Plan:    Testing:  BMP, Hematology Profile and PT/INR; Will request outside EKG done 2/7/22   Pre-anesthesia  visit       Visit focus: possible regional anesthesia and/or nerve block      Consultation:POC NP for anesthesia and medical optimization     Will request Cardiology clearance from outside cardiologist, Dr Boswell, (including clinic notes, EKG, TTE, nuclear stress test, device interrogation report-Pacemaker)    Navigation: Tests Scheduled.              Consults scheduled.             Results will be tracked by Preop Clinic.     Addendum 3/3/22: received outside Cardiology clearance per Dr Boswell, EKG (2/7/22), NP clinic note (11/30/21), pacemaker interrogation report (9/18/21)-all  scanned to media/bg     Addendum 3/4/22: received outside TTE (11/3/20), stress test (12/22/20)-scanned to media/bg

## 2022-02-28 ENCOUNTER — PATIENT OUTREACH (OUTPATIENT)
Dept: ADMINISTRATIVE | Facility: OTHER | Age: 62
End: 2022-02-28
Payer: MEDICARE

## 2022-03-02 ENCOUNTER — PATIENT MESSAGE (OUTPATIENT)
Dept: ADMINISTRATIVE | Facility: OTHER | Age: 62
End: 2022-03-02
Payer: MEDICARE

## 2022-03-02 ENCOUNTER — OFFICE VISIT (OUTPATIENT)
Dept: SPORTS MEDICINE | Facility: CLINIC | Age: 62
End: 2022-03-02
Payer: MEDICARE

## 2022-03-02 VITALS
WEIGHT: 285 LBS | BODY MASS INDEX: 40.8 KG/M2 | HEART RATE: 67 BPM | HEIGHT: 70 IN | RESPIRATION RATE: 18 BRPM | SYSTOLIC BLOOD PRESSURE: 118 MMHG | DIASTOLIC BLOOD PRESSURE: 79 MMHG

## 2022-03-02 DIAGNOSIS — M17.12 PRIMARY OSTEOARTHRITIS OF LEFT KNEE: Primary | ICD-10-CM

## 2022-03-02 DIAGNOSIS — M21.162 GENU VARUM, ACQUIRED, LEFT: ICD-10-CM

## 2022-03-02 PROCEDURE — 1160F RVW MEDS BY RX/DR IN RCRD: CPT | Mod: HCNC,CPTII,S$GLB, | Performed by: PHYSICIAN ASSISTANT

## 2022-03-02 PROCEDURE — 99999 PR PBB SHADOW E&M-EST. PATIENT-LVL V: CPT | Mod: PBBFAC,HCNC,, | Performed by: PHYSICIAN ASSISTANT

## 2022-03-02 PROCEDURE — 3078F PR MOST RECENT DIASTOLIC BLOOD PRESSURE < 80 MM HG: ICD-10-PCS | Mod: HCNC,CPTII,S$GLB, | Performed by: PHYSICIAN ASSISTANT

## 2022-03-02 PROCEDURE — 99214 OFFICE O/P EST MOD 30 MIN: CPT | Mod: HCNC,S$GLB,, | Performed by: PHYSICIAN ASSISTANT

## 2022-03-02 PROCEDURE — 3008F PR BODY MASS INDEX (BMI) DOCUMENTED: ICD-10-PCS | Mod: HCNC,CPTII,S$GLB, | Performed by: PHYSICIAN ASSISTANT

## 2022-03-02 PROCEDURE — 3078F DIAST BP <80 MM HG: CPT | Mod: HCNC,CPTII,S$GLB, | Performed by: PHYSICIAN ASSISTANT

## 2022-03-02 PROCEDURE — 99214 PR OFFICE/OUTPT VISIT, EST, LEVL IV, 30-39 MIN: ICD-10-PCS | Mod: HCNC,S$GLB,, | Performed by: PHYSICIAN ASSISTANT

## 2022-03-02 PROCEDURE — 99999 PR PBB SHADOW E&M-EST. PATIENT-LVL V: ICD-10-PCS | Mod: PBBFAC,HCNC,, | Performed by: PHYSICIAN ASSISTANT

## 2022-03-02 PROCEDURE — 1159F PR MEDICATION LIST DOCUMENTED IN MEDICAL RECORD: ICD-10-PCS | Mod: HCNC,CPTII,S$GLB, | Performed by: PHYSICIAN ASSISTANT

## 2022-03-02 PROCEDURE — 3008F BODY MASS INDEX DOCD: CPT | Mod: HCNC,CPTII,S$GLB, | Performed by: PHYSICIAN ASSISTANT

## 2022-03-02 PROCEDURE — 3074F PR MOST RECENT SYSTOLIC BLOOD PRESSURE < 130 MM HG: ICD-10-PCS | Mod: HCNC,CPTII,S$GLB, | Performed by: PHYSICIAN ASSISTANT

## 2022-03-02 PROCEDURE — 1159F MED LIST DOCD IN RCRD: CPT | Mod: HCNC,CPTII,S$GLB, | Performed by: PHYSICIAN ASSISTANT

## 2022-03-02 PROCEDURE — 3074F SYST BP LT 130 MM HG: CPT | Mod: HCNC,CPTII,S$GLB, | Performed by: PHYSICIAN ASSISTANT

## 2022-03-02 PROCEDURE — 1160F PR REVIEW ALL MEDS BY PRESCRIBER/CLIN PHARMACIST DOCUMENTED: ICD-10-PCS | Mod: HCNC,CPTII,S$GLB, | Performed by: PHYSICIAN ASSISTANT

## 2022-03-02 RX ORDER — METHOCARBAMOL 500 MG/1
500 TABLET, FILM COATED ORAL 3 TIMES DAILY
Qty: 30 TABLET | Refills: 0 | Status: SHIPPED | OUTPATIENT
Start: 2022-03-02 | End: 2022-03-28

## 2022-03-02 RX ORDER — SODIUM CHLORIDE 9 MG/ML
INJECTION, SOLUTION INTRAVENOUS CONTINUOUS
Status: CANCELLED | OUTPATIENT
Start: 2022-03-02 | End: 2022-03-03

## 2022-03-02 RX ORDER — CELECOXIB 200 MG/1
200 CAPSULE ORAL 2 TIMES DAILY
Qty: 60 CAPSULE | Refills: 2 | Status: SHIPPED | OUTPATIENT
Start: 2022-03-02 | End: 2022-04-27 | Stop reason: SDUPTHER

## 2022-03-02 RX ORDER — SODIUM CHLORIDE 0.9 % (FLUSH) 0.9 %
10 SYRINGE (ML) INJECTION
Status: CANCELLED | OUTPATIENT
Start: 2022-03-02

## 2022-03-02 RX ORDER — OXYCODONE AND ACETAMINOPHEN 10; 325 MG/1; MG/1
1 TABLET ORAL EVERY 6 HOURS PRN
Qty: 28 TABLET | Refills: 0 | Status: SHIPPED | OUTPATIENT
Start: 2022-03-02 | End: 2023-03-09

## 2022-03-02 RX ORDER — PROCHLORPERAZINE EDISYLATE 5 MG/ML
5 INJECTION INTRAMUSCULAR; INTRAVENOUS EVERY 6 HOURS PRN
Status: CANCELLED | OUTPATIENT
Start: 2022-03-02

## 2022-03-02 RX ORDER — NALOXONE HCL 0.4 MG/ML
0.02 VIAL (ML) INJECTION
Status: CANCELLED | OUTPATIENT
Start: 2022-03-02

## 2022-03-02 RX ORDER — PROMETHAZINE HYDROCHLORIDE 25 MG/1
25 TABLET ORAL EVERY 6 HOURS PRN
Qty: 30 TABLET | Refills: 0 | Status: SHIPPED | OUTPATIENT
Start: 2022-03-02 | End: 2022-04-01

## 2022-03-02 RX ORDER — POLYETHYLENE GLYCOL 3350 17 G/17G
17 POWDER, FOR SOLUTION ORAL DAILY
Status: CANCELLED | OUTPATIENT
Start: 2022-03-02

## 2022-03-02 RX ORDER — FAMOTIDINE 20 MG/1
20 TABLET, FILM COATED ORAL 2 TIMES DAILY
Status: CANCELLED | OUTPATIENT
Start: 2022-03-02

## 2022-03-02 RX ORDER — METHOCARBAMOL 750 MG/1
750 TABLET, FILM COATED ORAL 3 TIMES DAILY
Status: CANCELLED | OUTPATIENT
Start: 2022-03-02

## 2022-03-02 RX ORDER — ROPIVACAINE/EPI/CLONIDINE/KET 2.46-0.005
SYRINGE (ML) INJECTION ONCE
Status: CANCELLED | OUTPATIENT
Start: 2022-03-02 | End: 2022-03-02

## 2022-03-02 RX ORDER — MUPIROCIN 20 MG/G
1 OINTMENT TOPICAL
Status: CANCELLED | OUTPATIENT
Start: 2022-03-02

## 2022-03-02 RX ORDER — SODIUM CHLORIDE 9 MG/ML
INJECTION, SOLUTION INTRAVENOUS
Status: CANCELLED | OUTPATIENT
Start: 2022-03-02

## 2022-03-02 RX ORDER — ACETAMINOPHEN 500 MG
1000 TABLET ORAL
Status: CANCELLED | OUTPATIENT
Start: 2022-03-02

## 2022-03-02 RX ORDER — ASPIRIN 81 MG/1
81 TABLET ORAL 2 TIMES DAILY
Status: CANCELLED | OUTPATIENT
Start: 2022-03-02

## 2022-03-02 RX ORDER — PREGABALIN 75 MG/1
75 CAPSULE ORAL NIGHTLY
Status: CANCELLED | OUTPATIENT
Start: 2022-03-02

## 2022-03-02 RX ORDER — LIDOCAINE HYDROCHLORIDE 10 MG/ML
1 INJECTION, SOLUTION EPIDURAL; INFILTRATION; INTRACAUDAL; PERINEURAL
Status: CANCELLED | OUTPATIENT
Start: 2022-03-02

## 2022-03-02 RX ORDER — ACETAMINOPHEN 500 MG
1000 TABLET ORAL EVERY 6 HOURS
Status: CANCELLED | OUTPATIENT
Start: 2022-03-02

## 2022-03-02 RX ORDER — BISACODYL 10 MG
10 SUPPOSITORY, RECTAL RECTAL EVERY 12 HOURS PRN
Status: CANCELLED | OUTPATIENT
Start: 2022-03-02

## 2022-03-02 RX ORDER — OXYCODONE HYDROCHLORIDE 5 MG/1
5 TABLET ORAL
Status: CANCELLED | OUTPATIENT
Start: 2022-03-02

## 2022-03-02 RX ORDER — AMOXICILLIN 250 MG
1 CAPSULE ORAL 2 TIMES DAILY
Status: CANCELLED | OUTPATIENT
Start: 2022-03-02

## 2022-03-02 RX ORDER — CELECOXIB 200 MG/1
400 CAPSULE ORAL
Status: CANCELLED | OUTPATIENT
Start: 2022-03-02

## 2022-03-02 RX ORDER — OXYCODONE HYDROCHLORIDE 5 MG/1
10 TABLET ORAL
Status: CANCELLED | OUTPATIENT
Start: 2022-03-02

## 2022-03-02 RX ORDER — ASPIRIN 325 MG
325 TABLET, DELAYED RELEASE (ENTERIC COATED) ORAL DAILY
Qty: 42 TABLET | Refills: 0 | Status: SHIPPED | OUTPATIENT
Start: 2022-03-02 | End: 2022-04-29

## 2022-03-02 RX ORDER — PREGABALIN 75 MG/1
75 CAPSULE ORAL
Status: CANCELLED | OUTPATIENT
Start: 2022-03-02

## 2022-03-02 NOTE — H&P
Black Salesn  is here for a completion of his perioperative paperwork. he  Is scheduled to undergo Left total knee arthroplasty- Conformis iTotal to be performed by Dr. Trevino on 3/18/22. He  does need clearance for this procedure.   Patient reports feeling a circular mass in his VMO of his left knee that he told Dr. Trevino about previously.    Appt with Pre-op center on 3/7/22 at 11am.     Pending clearance by his cardiologist. Dr. Pelletier. Appt with him tomorrow. Instructed patient to bring this clearance to the pre-op center.    Patient had a Sinuplasty on 2/21/22 at [facility] in [facility].    GOING HOME SAME DAY    Risks, indications and benefits of the surgical procedure were discussed with the patient. All questions with regard to surgery, rehab, expected return to functional activities, activities of daily living and recreational endeavors were answered to his satisfaction.    Discussed COVID-19 with the patient, they are aware of our current policies and procedures, were given the option of delaying surgery, and they elect to proceed.    Patient was informed and understands the risks of surgery are greater for patients with a current condition or history of heart disease, obesity, clotting disorders, recurrent infections, steroid use, current or past smoking, and factors such as sedentary lifestyle and noncompliance with medications, therapy or follow-up. The degree of the increased risk is hard to estimate with any degree of precision.    Once no other questions were asked, a brief history and physical exam was then performed.    PAST MEDICAL HISTORY:   Past Medical History:   Diagnosis Date    Hypertension     Major depression, single episode 9/13/2013    Morbid obesity with BMI of 40.0-44.9, adult 7/18/2016    AUREA (obstructive sleep apnea)     Primary osteoarthritis of left knee 7/18/2016    Screening for colorectal cancer 12/13/2018    Normal 2014 -10 yrs    Sinus tanja-tachy syndrome  7/15/2015    Syncope and collapse     vasovagal -sees EP Cards     PAST SURGICAL HISTORY:   Past Surgical History:   Procedure Laterality Date    knee scope      REMOVAL OF IMPLANTABLE LOOP RECORDER N/A 3/2/2020    Procedure: REMOVAL, IMPLANTABLE LOOP RECORDER;  Surgeon: Cy Villeda MD;  Location: [facility] EP LAB;  Service: Cardiology;  Laterality: N/A;  EOC, ILR Removal, RN Sedation, DM, 3 Prep    SINUS SURGERY  2022     FAMILY HISTORY:   Family History   Problem Relation Age of Onset    Hypertension Mother     Diabetes Mother     Heart disease Mother     Heart disease Father     Cataracts Father     No Known Problems Sister     No Known Problems Brother     No Known Problems Maternal Aunt     No Known Problems Maternal Uncle     No Known Problems Paternal Aunt     No Known Problems Paternal Uncle     No Known Problems Maternal Grandmother     No Known Problems Maternal Grandfather     No Known Problems Paternal Grandmother     No Known Problems Paternal Grandfather     Amblyopia Neg Hx     Blindness Neg Hx     Cancer Neg Hx     Glaucoma Neg Hx     Macular degeneration Neg Hx     Retinal detachment Neg Hx     Strabismus Neg Hx     Stroke Neg Hx     Thyroid disease Neg Hx      SOCIAL HISTORY:   Social History     Socioeconomic History    Marital status:    Tobacco Use    Smoking status: Former Smoker     Packs/day: 0.50     Quit date: 2016     Years since quittin.5    Smokeless tobacco: Never Used   Substance and Sexual Activity    Alcohol use: Yes    Drug use: No     Social Determinants of Health     Financial Resource Strain: Low Risk     Difficulty of Paying Living Expenses: Not hard at all   Food Insecurity: No Food Insecurity    Worried About Running Out of Food in the Last Year: Never true    Ran Out of Food in the Last Year: Never true   Transportation Needs: No Transportation Needs    Lack of Transportation (Medical): No    Lack of Transportation  (Non-Medical): No   Physical Activity: Sufficiently Active    Days of Exercise per Week: 3 days    Minutes of Exercise per Session: 60 min   Stress: No Stress Concern Present    Feeling of Stress : Only a little   Social Connections: Unknown    Frequency of Communication with Friends and Family: More than three times a week    Frequency of Social Gatherings with Friends and Family: Twice a week    Active Member of Clubs or Organizations: No    Attends Club or Organization Meetings: Patient refused    Marital Status:    Housing Stability: Low Risk     Unable to Pay for Housing in the Last Year: No    Number of Places Lived in the Last Year: 1    Unstable Housing in the Last Year: No       MEDICATIONS:   Current Outpatient Medications:     atorvastatin (LIPITOR) 20 MG tablet, Take 2 tablets (40 mg total) by mouth once daily., Disp: 90 tablet, Rfl: 3    atorvastatin (LIPITOR) 40 MG tablet, Take 40 mg by mouth once daily., Disp: , Rfl:     azelastine (ASTELIN) 137 mcg (0.1 %) nasal spray, by Nasal route 2 (two) times daily as needed., Disp: , Rfl:     chlorthalidone (HYGROTEN) 50 MG Tab, Take 1 tablet (50 mg total) by mouth once daily., Disp: 90 tablet, Rfl: 3    chlorthalidone (HYGROTEN) 50 MG Tab, Take 1 tablet by mouth once daily., Disp: , Rfl:     EPINEPHrine (EPIPEN) 0.3 mg/0.3 mL AtIn, use as directed; 2/22/22 states for cat allergy-never used, Disp: , Rfl:     fish oil-omega-3 fatty acids 300-1,000 mg capsule, Take 1 capsule by mouth once daily., Disp: , Rfl:     fluticasone propionate (FLONASE) 50 mcg/actuation nasal spray, by Each Nostril route once daily., Disp: , Rfl:     HYDROcodone-acetaminophen (NORCO) 7.5-325 mg per tablet, Take 1 tablet by mouth every 6 (six) hours as needed for Pain., Disp: , Rfl:     multivitamin (THERAGRAN) per tablet, Take 1 tablet by mouth once daily., Disp: , Rfl:     sulfamethoxazole-trimethoprim 800-160mg (BACTRIM DS) 800-160 mg Tab, Take 1 tablet by  mouth every 12 (twelve) hours. X 14 days -started prior to sinus surgery on 2/21/22, Disp: , Rfl:     aspirin (ECOTRIN) 81 MG EC tablet, Take 1 tablet (81 mg total) by mouth once daily., Disp: 90 tablet, Rfl: 1  No current facility-administered medications for this visit.    Facility-Administered Medications Ordered in Other Visits:     0.9%  NaCl infusion, , Intravenous, Continuous, Aga Valencia NP    vancomycin in dextrose 5 % 1 gram/250 mL IVPB 1,000 mg, 1,000 mg, Intravenous, On Call Procedure, Aga Valencia NP, 1,000 mg at 03/02/20 1509  ALLERGIES:   Review of patient's allergies indicates:   Allergen Reactions    Penicillins Other (See Comments)     Unknown reaction as a baby       Review of Systems   Constitution: Negative. Negative for chills, fever and night sweats.   HENT: Negative for congestion and headaches.    Eyes: Negative for blurred vision, left vision loss and right vision loss.   Cardiovascular: Negative for chest pain and syncope.   Respiratory: Negative for cough and shortness of breath.    Endocrine: Negative for polydipsia, polyphagia and polyuria.   Hematologic/Lymphatic: Negative for bleeding problem. Does not bruise/bleed easily.   Skin: Negative for dry skin, itching and rash.   Musculoskeletal: Negative for falls and muscle weakness.   Gastrointestinal: Negative for abdominal pain and bowel incontinence.   Genitourinary: Negative for bladder incontinence and nocturia.   Neurological: Negative for disturbances in coordination, loss of balance and seizures.   Psychiatric/Behavioral: Negative for depression. The patient does not have insomnia.    Allergic/Immunologic: Negative for hives and persistent infections.     PHYSICAL EXAM:  GEN: A&Ox3, WD WN NAD  HEENT: WNL  CHEST: CTAB, no W/R/R  HEART: RRR, no M/R/G   ABD: Soft, NT ND, BS x4 QUADS  MS: Refer to previous note for detailed MS exam  NEURO: CN II-XII intact       The surgical consent was then reviewed with the  patient, who agreed with all the contents of the consent form and it was signed. he was instructed to wait for a phone call from the anesthesia department prior to surgery to discuss past medical history, medications, and clearance. Also, informed he may be required to get additional testing per the anesthesia department prior to having surgery.     he was also instructed to present to Joint Boot Camp Class prior to surgery or his surgery may be cancelled or postponed.     Due to the serious nature of total joint infection and the high prevalence of community acquired MRSA, vancomycin will be used perioperatively.     PHYSICAL THERAPY:  He was also instructed regarding physical therapy and will begin POD # 1-3. He is doing physical therapy at [facility].    POST OP CARE:instructions were reviewed including care of the wound and dressing after surgery and when he can shower.     PAIN MANAGEMENT: Black Preciado was also given a pain management regime, which includes the TENS unit given to him by Tim Chopra along with the education required for its use. He was also instructed regarding the Polar ice unit that will be in place after surgery and his postoperative pain medications.     PAIN MEDICATION:  Percocet 10/325mg 1 po q 4-6 hours prn pain  Phenergan 25 mg one p.o. q.4-6 hours p.r.n. nausea and vomiting  Celebrex 200 mg BID  Robaxin 500mg TID PRN  Aspirin 325mg daily x 6 weeks for DVT prophylaxis starting on the evening after surgery.    Post op meds to be delivered bedside prior to discharge. Deliver to family if patient is in surgery at 5pm.     Patient denies history of seizures.     Patient will also use bilateral TEDs on lower extremities, SCDs during surgery, and early ambulation post-op. If the patient was previously taking 81mg baby aspirin, they were told to not take it will using the above stated aspirin and to restart the 81mg aspirin after completion of the  aspirin dose.       Patient was also told to buy over the counter Prilosec medication and take it once daily for GI protection as long as they are taking NSAIDs or Aspirin.    DVT prophylaxis was discussed with the patient today including risk factors for developing DVTs and history of DVTs. The patient was asked if any specific recommendations were given from the doctor/s that did pre-operative surgical clearance.         The patient was told that narcotic pain medications may make them drowsy and instructions were given to not sign legal documents, drive or operate heavy machinery, cars, or equipment while under the influence of narcotic medications.     As there were no other questions to be asked, he was given my business card along with Estela Trevino MD business card if he has any questions or concerns prior to surger

## 2022-03-02 NOTE — PROGRESS NOTES
Black Preciado is a 61 y.o. year old here today for pre surgery optimization visit  in preparation for a Left total knee arthroplasty- Conformis iTotal to be performed by Dr. Trevino on 3/18/22. he was last seen and treated in the clinic on 11/29/2021. he will be medically optimized by the pre op center. There has been no significant change in medical status since last visit. No fever, chills, malaise, or unexplained weight change.      Appt with Yaritza Arreola on 3/7/22 at 11am.  Pending cardiology clearance, Dr. Zamora.    Allergies, Medications, past medical and surgical history reviewed.    Focused examination performed.    Patient declined to see surgeon today. All questions answered. Patient encouraged to call with questions. Contact information given.     Pre, reginald, and post operative procedures and expectations discussed. Goals of successful surgery reviewed and include manageable pain levels, surgical site free of infection, medication management, and ambulation with PT/OT assistance. Healthy weight management discussed with patient and caregiver who were receptive to eduction of healthy diet and activity. No other necessary lifestyle changes identified. Educated patient about signs and symptoms of infection, medication management, anticoagulation therapy, risk of tobacco and alcohol use, and self-care to promote healing. Surgical guide given for future reference. Hibiclens given to patient with instructions. All questions were answered.     Black Preciado verbalized an understanding to the education and goals. Patient has displayed readiness to engage in care and is ready to proceed with surgery.  Patient reports his wife, Inna, is able and ready to provide assistance at home after discharge.    Surgical and blood consents signed.    Black Preciado will contact us if there are any questions, concerns, or changes in medical status prior to surgery.       Joint class: 2/7/22    COVID-19 test  date: 3/15/22     Patient has discussed discharge planning with surgeon. Patient will be discharged to home following surgery.   patient will be scheduled with [facility]    45 minutes of time was spent on patient education, review of records, templating, H&P, , appointment scheduling and optimizing patient for surgery.

## 2022-03-02 NOTE — H&P (VIEW-ONLY)
Black Salesn  is here for a completion of his perioperative paperwork. he  Is scheduled to undergo Left total knee arthroplasty- Conformis iTotal to be performed by Dr. Trevino on 3/18/22. He  does need clearance for this procedure.   Patient reports feeling a circular mass in his VMO of his left knee that he told Dr. Trevino about previously.    Appt with Pre-op center on 3/7/22 at 11am.     Pending clearance by his cardiologist. Dr. Pelletier. Appt with him tomorrow. Instructed patient to bring this clearance to the pre-op center.    Patient had a Sinuplasty on 2/21/22 at [facility] in [facility].    GOING HOME SAME DAY    Risks, indications and benefits of the surgical procedure were discussed with the patient. All questions with regard to surgery, rehab, expected return to functional activities, activities of daily living and recreational endeavors were answered to his satisfaction.    Discussed COVID-19 with the patient, they are aware of our current policies and procedures, were given the option of delaying surgery, and they elect to proceed.    Patient was informed and understands the risks of surgery are greater for patients with a current condition or history of heart disease, obesity, clotting disorders, recurrent infections, steroid use, current or past smoking, and factors such as sedentary lifestyle and noncompliance with medications, therapy or follow-up. The degree of the increased risk is hard to estimate with any degree of precision.    Once no other questions were asked, a brief history and physical exam was then performed.    PAST MEDICAL HISTORY:   Past Medical History:   Diagnosis Date    Hypertension     Major depression, single episode 9/13/2013    Morbid obesity with BMI of 40.0-44.9, adult 7/18/2016    AUREA (obstructive sleep apnea)     Primary osteoarthritis of left knee 7/18/2016    Screening for colorectal cancer 12/13/2018    Normal 2014 -10 yrs    Sinus tanja-tachy syndrome  7/15/2015    Syncope and collapse     vasovagal -sees EP Cards     PAST SURGICAL HISTORY:   Past Surgical History:   Procedure Laterality Date    knee scope      REMOVAL OF IMPLANTABLE LOOP RECORDER N/A 3/2/2020    Procedure: REMOVAL, IMPLANTABLE LOOP RECORDER;  Surgeon: Cy Villeda MD;  Location: [facility] EP LAB;  Service: Cardiology;  Laterality: N/A;  EOC, ILR Removal, RN Sedation, DM, 3 Prep    SINUS SURGERY  2022     FAMILY HISTORY:   Family History   Problem Relation Age of Onset    Hypertension Mother     Diabetes Mother     Heart disease Mother     Heart disease Father     Cataracts Father     No Known Problems Sister     No Known Problems Brother     No Known Problems Maternal Aunt     No Known Problems Maternal Uncle     No Known Problems Paternal Aunt     No Known Problems Paternal Uncle     No Known Problems Maternal Grandmother     No Known Problems Maternal Grandfather     No Known Problems Paternal Grandmother     No Known Problems Paternal Grandfather     Amblyopia Neg Hx     Blindness Neg Hx     Cancer Neg Hx     Glaucoma Neg Hx     Macular degeneration Neg Hx     Retinal detachment Neg Hx     Strabismus Neg Hx     Stroke Neg Hx     Thyroid disease Neg Hx      SOCIAL HISTORY:   Social History     Socioeconomic History    Marital status:    Tobacco Use    Smoking status: Former Smoker     Packs/day: 0.50     Quit date: 2016     Years since quittin.5    Smokeless tobacco: Never Used   Substance and Sexual Activity    Alcohol use: Yes    Drug use: No     Social Determinants of Health     Financial Resource Strain: Low Risk     Difficulty of Paying Living Expenses: Not hard at all   Food Insecurity: No Food Insecurity    Worried About Running Out of Food in the Last Year: Never true    Ran Out of Food in the Last Year: Never true   Transportation Needs: No Transportation Needs    Lack of Transportation (Medical): No    Lack of Transportation  (Non-Medical): No   Physical Activity: Sufficiently Active    Days of Exercise per Week: 3 days    Minutes of Exercise per Session: 60 min   Stress: No Stress Concern Present    Feeling of Stress : Only a little   Social Connections: Unknown    Frequency of Communication with Friends and Family: More than three times a week    Frequency of Social Gatherings with Friends and Family: Twice a week    Active Member of Clubs or Organizations: No    Attends Club or Organization Meetings: Patient refused    Marital Status:    Housing Stability: Low Risk     Unable to Pay for Housing in the Last Year: No    Number of Places Lived in the Last Year: 1    Unstable Housing in the Last Year: No       MEDICATIONS:   Current Outpatient Medications:     atorvastatin (LIPITOR) 20 MG tablet, Take 2 tablets (40 mg total) by mouth once daily., Disp: 90 tablet, Rfl: 3    atorvastatin (LIPITOR) 40 MG tablet, Take 40 mg by mouth once daily., Disp: , Rfl:     azelastine (ASTELIN) 137 mcg (0.1 %) nasal spray, by Nasal route 2 (two) times daily as needed., Disp: , Rfl:     chlorthalidone (HYGROTEN) 50 MG Tab, Take 1 tablet (50 mg total) by mouth once daily., Disp: 90 tablet, Rfl: 3    chlorthalidone (HYGROTEN) 50 MG Tab, Take 1 tablet by mouth once daily., Disp: , Rfl:     EPINEPHrine (EPIPEN) 0.3 mg/0.3 mL AtIn, use as directed; 2/22/22 states for cat allergy-never used, Disp: , Rfl:     fish oil-omega-3 fatty acids 300-1,000 mg capsule, Take 1 capsule by mouth once daily., Disp: , Rfl:     fluticasone propionate (FLONASE) 50 mcg/actuation nasal spray, by Each Nostril route once daily., Disp: , Rfl:     HYDROcodone-acetaminophen (NORCO) 7.5-325 mg per tablet, Take 1 tablet by mouth every 6 (six) hours as needed for Pain., Disp: , Rfl:     multivitamin (THERAGRAN) per tablet, Take 1 tablet by mouth once daily., Disp: , Rfl:     sulfamethoxazole-trimethoprim 800-160mg (BACTRIM DS) 800-160 mg Tab, Take 1 tablet by  mouth every 12 (twelve) hours. X 14 days -started prior to sinus surgery on 2/21/22, Disp: , Rfl:     aspirin (ECOTRIN) 81 MG EC tablet, Take 1 tablet (81 mg total) by mouth once daily., Disp: 90 tablet, Rfl: 1  No current facility-administered medications for this visit.    Facility-Administered Medications Ordered in Other Visits:     0.9%  NaCl infusion, , Intravenous, Continuous, Aga Valencia NP    vancomycin in dextrose 5 % 1 gram/250 mL IVPB 1,000 mg, 1,000 mg, Intravenous, On Call Procedure, Aga Valencia NP, 1,000 mg at 03/02/20 1509  ALLERGIES:   Review of patient's allergies indicates:   Allergen Reactions    Penicillins Other (See Comments)     Unknown reaction as a baby       Review of Systems   Constitution: Negative. Negative for chills, fever and night sweats.   HENT: Negative for congestion and headaches.    Eyes: Negative for blurred vision, left vision loss and right vision loss.   Cardiovascular: Negative for chest pain and syncope.   Respiratory: Negative for cough and shortness of breath.    Endocrine: Negative for polydipsia, polyphagia and polyuria.   Hematologic/Lymphatic: Negative for bleeding problem. Does not bruise/bleed easily.   Skin: Negative for dry skin, itching and rash.   Musculoskeletal: Negative for falls and muscle weakness.   Gastrointestinal: Negative for abdominal pain and bowel incontinence.   Genitourinary: Negative for bladder incontinence and nocturia.   Neurological: Negative for disturbances in coordination, loss of balance and seizures.   Psychiatric/Behavioral: Negative for depression. The patient does not have insomnia.    Allergic/Immunologic: Negative for hives and persistent infections.     PHYSICAL EXAM:  GEN: A&Ox3, WD WN NAD  HEENT: WNL  CHEST: CTAB, no W/R/R  HEART: RRR, no M/R/G   ABD: Soft, NT ND, BS x4 QUADS  MS: Refer to previous note for detailed MS exam  NEURO: CN II-XII intact       The surgical consent was then reviewed with the  patient, who agreed with all the contents of the consent form and it was signed. he was instructed to wait for a phone call from the anesthesia department prior to surgery to discuss past medical history, medications, and clearance. Also, informed he may be required to get additional testing per the anesthesia department prior to having surgery.     he was also instructed to present to Joint Boot Camp Class prior to surgery or his surgery may be cancelled or postponed.     Due to the serious nature of total joint infection and the high prevalence of community acquired MRSA, vancomycin will be used perioperatively.     PHYSICAL THERAPY:  He was also instructed regarding physical therapy and will begin POD # 1-3. He is doing physical therapy at [facility].    POST OP CARE:instructions were reviewed including care of the wound and dressing after surgery and when he can shower.     PAIN MANAGEMENT: Black Preciado was also given a pain management regime, which includes the TENS unit given to him by Tim Chopra along with the education required for its use. He was also instructed regarding the Polar ice unit that will be in place after surgery and his postoperative pain medications.     PAIN MEDICATION:  Percocet 10/325mg 1 po q 4-6 hours prn pain  Phenergan 25 mg one p.o. q.4-6 hours p.r.n. nausea and vomiting  Celebrex 200 mg BID  Robaxin 500mg TID PRN  Aspirin 325mg daily x 6 weeks for DVT prophylaxis starting on the evening after surgery.    Post op meds to be delivered bedside prior to discharge. Deliver to family if patient is in surgery at 5pm.     Patient denies history of seizures.     Patient will also use bilateral TEDs on lower extremities, SCDs during surgery, and early ambulation post-op. If the patient was previously taking 81mg baby aspirin, they were told to not take it will using the above stated aspirin and to restart the 81mg aspirin after completion of the  aspirin dose.       Patient was also told to buy over the counter Prilosec medication and take it once daily for GI protection as long as they are taking NSAIDs or Aspirin.    DVT prophylaxis was discussed with the patient today including risk factors for developing DVTs and history of DVTs. The patient was asked if any specific recommendations were given from the doctor/s that did pre-operative surgical clearance.         The patient was told that narcotic pain medications may make them drowsy and instructions were given to not sign legal documents, drive or operate heavy machinery, cars, or equipment while under the influence of narcotic medications.     As there were no other questions to be asked, he was given my business card along with Estela Trevino MD business card if he has any questions or concerns prior to surger

## 2022-03-03 ENCOUNTER — CLINICAL SUPPORT (OUTPATIENT)
Dept: REHABILITATION | Facility: HOSPITAL | Age: 62
End: 2022-03-03
Payer: MEDICARE

## 2022-03-03 DIAGNOSIS — Z78.9 DIFFICULTY NAVIGATING STAIRS: ICD-10-CM

## 2022-03-03 DIAGNOSIS — R26.89 IMPAIRMENT OF BALANCE: Primary | ICD-10-CM

## 2022-03-03 DIAGNOSIS — R26.9 GAIT ABNORMALITY: ICD-10-CM

## 2022-03-03 PROCEDURE — 97112 NEUROMUSCULAR REEDUCATION: CPT | Mod: HCNC

## 2022-03-03 PROCEDURE — 97530 THERAPEUTIC ACTIVITIES: CPT | Mod: HCNC

## 2022-03-03 PROCEDURE — 97110 THERAPEUTIC EXERCISES: CPT | Mod: HCNC

## 2022-03-03 NOTE — PROGRESS NOTES
Physical Therapy Daily Treatment Note     Name: Black Preciado  Clinic Number: [id number]    Therapy Diagnosis:   Encounter Diagnoses   Name Primary?    Impairment of balance Yes    Difficulty navigating stairs     Gait abnormality      Physician: Noman Jeffery, *    Visit Date: 3/3/2022  Physician Orders: PT Eval and Treat  Medical Diagnosis from Referral:   M17.12 (ICD-10-CM) - Primary osteoarthritis of left knee   M21.162 (ICD-10-CM) - Acquired genu varum, left   M25.561,M25.562,G89.29 (ICD-10-CM) - Chronic pain of both knees      Evaluation Date: 12/6/2021  Authorization Period Expiration: 06/20/2020  Plan of Care Expiration: 04/01/2022  Visit # / Visits authorized: 5 / 30 (8 total)    Time In: 1003  Time Out: 1059  Total Billable Time: 53 minutes    Precautions: Standard and pacemaker    Subjective     Pt reports: He feels like he is definitely getting around better than he was before. His TKA surgery was moved up to 3/18.     He was compliant with home exercise program.  Response to previous treatment: N/A  Functional change: N/A    Pain: Not verbalized 5/10  Location: (L) Knee     Objective     Daily Measurements: NT      Daily Treatment       Black received therapeutic exercises to develop strength, endurance, ROM, flexibility and core stabilization for 35 minutes including:  Bike completed for 10 min to increase ROM, endurance and decrease pain to improve tolerance to ADLs and age related activities.  Long seated HS stretch 4x 30 seconds   Long seated GS complex stretch 4x 30 seconds   Glute bridges w/ RTB and 26# KB 3x12      Black received the following manual therapy techniques: were applied to the: L Knee for 8 minutes, including:  Accessory Tibial ER Mob  PA Tibial Accessory glide Grd III-IV      Black participated in dynamic functional therapeutic activities to improve functional performance for 10  minutes, including:  Mini Squats at [employer] x6 reps EMO x10 rounds      Home  Exercises and Patient Education Provided     Education provided:   - Reviewed HEP  -Education on importance of rest/work cycles to decrease knee pain    Written Home Exercises Provided: yes.  Exercises were reviewed and Black was able to demonstrate them prior to the end of the session.  Black demonstrated good  understanding of the education provided.     See EMR under patient instructions for exercises given.     Assessment     Pt ana's improved mobility and flexibility since beginning PT as well as improved tolerance to functional tasks such as squatting. He is currently scheduled for his TKA 3/18/22 and will transition to our total joint rehab clinic 3 days following his procedure. After discussing his options Black elected to focus on his HEP until his procedure.     Black Is progressing well towards his goals.     Pt will continue to benefit from skilled outpatient physical therapy to address the deficits listed in the problem list box on initial evaluation, provide pt/family education and to maximize pt's level of independence in the home and community environment. Pt prognosis is Good.     Pt's spiritual, cultural and educational needs considered and pt agreeable to plan of care and goals.    Anticipated barriers to physical therapy: None    Goals:  Short Term Goals: 6 weeks  1. Pt will be compliant with HEP 50% of prescribed amount. (Met)  2. The pt to demo improvement in FOTO score to meet or exceed MCID indicating a clinically meaningful change. (Met)  3.  Pt will demo improved squat mechanics which will include initiation with hip hinge and maintained hip ER activation. (Met)  4. Pt will demo ability to perform DL bridge with glute dominant pattern and without compensation. (Met)  5. Pt will improve hip abd strength by 1/2 muscle grade.      Long Term Goals: 16 weeks   1. Pt will be compliant with % of prescribed amount.   2. Pt will report ability to walk x20 minutes with NPRS <2/10.  "  3. Pt will demo ability to perform a functional DL squat to 24" height w/NPRS <3/10.  4. Pt will demo improved hip extension and TKE on L knee with gait.       Plan     Pt will focus on HEP until his scheduled procedure and resume formal rehab at our total joint clinic 3 days post-op. First visit has already been scheduled.     Deshawn Cintron, PT , DPT    "

## 2022-03-04 ENCOUNTER — PATIENT MESSAGE (OUTPATIENT)
Dept: ADMINISTRATIVE | Facility: OTHER | Age: 62
End: 2022-03-04
Payer: MEDICARE

## 2022-03-09 PROBLEM — I10 ESSENTIAL HYPERTENSION: Status: ACTIVE | Noted: 2020-11-02

## 2022-03-09 PROBLEM — E78.2 MIXED HYPERLIPIDEMIA: Status: ACTIVE | Noted: 2020-11-02

## 2022-03-10 ENCOUNTER — LAB VISIT (OUTPATIENT)
Dept: LAB | Facility: HOSPITAL | Age: 62
End: 2022-03-10
Attending: ANESTHESIOLOGY
Payer: MEDICARE

## 2022-03-10 ENCOUNTER — OFFICE VISIT (OUTPATIENT)
Dept: INTERNAL MEDICINE | Facility: CLINIC | Age: 62
End: 2022-03-10
Payer: MEDICARE

## 2022-03-10 VITALS
SYSTOLIC BLOOD PRESSURE: 131 MMHG | OXYGEN SATURATION: 98 % | TEMPERATURE: 98 F | HEIGHT: 70 IN | HEART RATE: 90 BPM | DIASTOLIC BLOOD PRESSURE: 81 MMHG | WEIGHT: 286 LBS | BODY MASS INDEX: 40.94 KG/M2 | RESPIRATION RATE: 18 BRPM

## 2022-03-10 DIAGNOSIS — I47.29 NSVT (NONSUSTAINED VENTRICULAR TACHYCARDIA): ICD-10-CM

## 2022-03-10 DIAGNOSIS — R55 VASOVAGAL SYNCOPE: Chronic | ICD-10-CM

## 2022-03-10 DIAGNOSIS — Z12.5 SCREENING FOR PROSTATE CANCER: ICD-10-CM

## 2022-03-10 DIAGNOSIS — I10 ESSENTIAL HYPERTENSION: ICD-10-CM

## 2022-03-10 DIAGNOSIS — F17.200 TOBACCO DEPENDENCE: Chronic | ICD-10-CM

## 2022-03-10 DIAGNOSIS — E78.00 PURE HYPERCHOLESTEROLEMIA: ICD-10-CM

## 2022-03-10 DIAGNOSIS — M17.12 PRIMARY OSTEOARTHRITIS OF LEFT KNEE: Chronic | ICD-10-CM

## 2022-03-10 DIAGNOSIS — G47.33 OSA (OBSTRUCTIVE SLEEP APNEA): ICD-10-CM

## 2022-03-10 DIAGNOSIS — Z01.818 PRE-OP TESTING: ICD-10-CM

## 2022-03-10 DIAGNOSIS — N40.0 BENIGN PROSTATIC HYPERPLASIA, UNSPECIFIED WHETHER LOWER URINARY TRACT SYMPTOMS PRESENT: ICD-10-CM

## 2022-03-10 DIAGNOSIS — E66.01 MORBID OBESITY WITH BMI OF 40.0-44.9, ADULT: Chronic | ICD-10-CM

## 2022-03-10 DIAGNOSIS — M79.605 PAIN OF LEFT LOWER EXTREMITY: ICD-10-CM

## 2022-03-10 DIAGNOSIS — Z00.00 ROUTINE MEDICAL EXAM: ICD-10-CM

## 2022-03-10 DIAGNOSIS — R73.9 HYPERGLYCEMIA: ICD-10-CM

## 2022-03-10 DIAGNOSIS — R73.03 PREDIABETES: ICD-10-CM

## 2022-03-10 DIAGNOSIS — Z86.73 PERSONAL HISTORY OF TIA (TRANSIENT ISCHEMIC ATTACK): ICD-10-CM

## 2022-03-10 DIAGNOSIS — R39.9 LOWER URINARY TRACT SYMPTOMS (LUTS): ICD-10-CM

## 2022-03-10 LAB
ALBUMIN SERPL BCP-MCNC: 3.9 G/DL (ref 3.5–5.2)
ALP SERPL-CCNC: 90 U/L (ref 55–135)
ALT SERPL W/O P-5'-P-CCNC: 62 U/L (ref 10–44)
ANION GAP SERPL CALC-SCNC: 11 MMOL/L (ref 8–16)
ANION GAP SERPL CALC-SCNC: 11 MMOL/L (ref 8–16)
AST SERPL-CCNC: 39 U/L (ref 10–40)
BASOPHILS # BLD AUTO: 0.04 K/UL (ref 0–0.2)
BASOPHILS NFR BLD: 0.6 % (ref 0–1.9)
BILIRUB SERPL-MCNC: 0.5 MG/DL (ref 0.1–1)
BUN SERPL-MCNC: 10 MG/DL (ref 6–20)
BUN SERPL-MCNC: 10 MG/DL (ref 6–20)
CALCIUM SERPL-MCNC: 9.4 MG/DL (ref 8.7–10.5)
CALCIUM SERPL-MCNC: 9.4 MG/DL (ref 8.7–10.5)
CHLORIDE SERPL-SCNC: 98 MMOL/L (ref 95–110)
CHLORIDE SERPL-SCNC: 98 MMOL/L (ref 95–110)
CO2 SERPL-SCNC: 28 MMOL/L (ref 23–29)
CO2 SERPL-SCNC: 28 MMOL/L (ref 23–29)
COMPLEXED PSA SERPL-MCNC: 0.72 NG/ML (ref 0–4)
CREAT SERPL-MCNC: 0.9 MG/DL (ref 0.5–1.4)
CREAT SERPL-MCNC: 0.9 MG/DL (ref 0.5–1.4)
DIFFERENTIAL METHOD: ABNORMAL
EOSINOPHIL # BLD AUTO: 0.6 K/UL (ref 0–0.5)
EOSINOPHIL NFR BLD: 9.2 % (ref 0–8)
ERYTHROCYTE [DISTWIDTH] IN BLOOD BY AUTOMATED COUNT: 13 % (ref 11.5–14.5)
ERYTHROCYTE [DISTWIDTH] IN BLOOD BY AUTOMATED COUNT: 13 % (ref 11.5–14.5)
EST. GFR  (AFRICAN AMERICAN): >60 ML/MIN/1.73 M^2
EST. GFR  (AFRICAN AMERICAN): >60 ML/MIN/1.73 M^2
EST. GFR  (NON AFRICAN AMERICAN): >60 ML/MIN/1.73 M^2
EST. GFR  (NON AFRICAN AMERICAN): >60 ML/MIN/1.73 M^2
GLUCOSE SERPL-MCNC: 94 MG/DL (ref 70–110)
GLUCOSE SERPL-MCNC: 94 MG/DL (ref 70–110)
HCT VFR BLD AUTO: 44.8 % (ref 40–54)
HCT VFR BLD AUTO: 44.8 % (ref 40–54)
HGB BLD-MCNC: 14.6 G/DL (ref 14–18)
HGB BLD-MCNC: 14.6 G/DL (ref 14–18)
IMM GRANULOCYTES # BLD AUTO: 0.02 K/UL (ref 0–0.04)
IMM GRANULOCYTES NFR BLD AUTO: 0.3 % (ref 0–0.5)
INR PPP: 1 (ref 0.8–1.2)
LYMPHOCYTES # BLD AUTO: 2.3 K/UL (ref 1–4.8)
LYMPHOCYTES NFR BLD: 32.9 % (ref 18–48)
MCH RBC QN AUTO: 31.3 PG (ref 27–31)
MCH RBC QN AUTO: 31.3 PG (ref 27–31)
MCHC RBC AUTO-ENTMCNC: 32.6 G/DL (ref 32–36)
MCHC RBC AUTO-ENTMCNC: 32.6 G/DL (ref 32–36)
MCV RBC AUTO: 96 FL (ref 82–98)
MCV RBC AUTO: 96 FL (ref 82–98)
MONOCYTES # BLD AUTO: 0.8 K/UL (ref 0.3–1)
MONOCYTES NFR BLD: 11.2 % (ref 4–15)
NEUTROPHILS # BLD AUTO: 3.2 K/UL (ref 1.8–7.7)
NEUTROPHILS NFR BLD: 45.8 % (ref 38–73)
NRBC BLD-RTO: 0 /100 WBC
PLATELET # BLD AUTO: 220 K/UL (ref 150–450)
PLATELET # BLD AUTO: 220 K/UL (ref 150–450)
PMV BLD AUTO: 12.1 FL (ref 9.2–12.9)
PMV BLD AUTO: 12.1 FL (ref 9.2–12.9)
POTASSIUM SERPL-SCNC: 3.5 MMOL/L (ref 3.5–5.1)
POTASSIUM SERPL-SCNC: 3.5 MMOL/L (ref 3.5–5.1)
PROT SERPL-MCNC: 7.2 G/DL (ref 6–8.4)
PROTHROMBIN TIME: 10.7 SEC (ref 9–12.5)
RBC # BLD AUTO: 4.67 M/UL (ref 4.6–6.2)
RBC # BLD AUTO: 4.67 M/UL (ref 4.6–6.2)
SODIUM SERPL-SCNC: 137 MMOL/L (ref 136–145)
SODIUM SERPL-SCNC: 137 MMOL/L (ref 136–145)
TSH SERPL DL<=0.005 MIU/L-ACNC: 1.49 UIU/ML (ref 0.4–4)
WBC # BLD AUTO: 6.99 K/UL (ref 3.9–12.7)
WBC # BLD AUTO: 6.99 K/UL (ref 3.9–12.7)

## 2022-03-10 PROCEDURE — 99214 PR OFFICE/OUTPT VISIT, EST, LEVL IV, 30-39 MIN: ICD-10-PCS | Mod: S$GLB,,, | Performed by: NURSE PRACTITIONER

## 2022-03-10 PROCEDURE — 1160F PR REVIEW ALL MEDS BY PRESCRIBER/CLIN PHARMACIST DOCUMENTED: ICD-10-PCS | Mod: CPTII,S$GLB,, | Performed by: NURSE PRACTITIONER

## 2022-03-10 PROCEDURE — 36415 COLL VENOUS BLD VENIPUNCTURE: CPT | Performed by: ANESTHESIOLOGY

## 2022-03-10 PROCEDURE — 3075F PR MOST RECENT SYSTOLIC BLOOD PRESS GE 130-139MM HG: ICD-10-PCS | Mod: CPTII,S$GLB,, | Performed by: NURSE PRACTITIONER

## 2022-03-10 PROCEDURE — 1159F PR MEDICATION LIST DOCUMENTED IN MEDICAL RECORD: ICD-10-PCS | Mod: CPTII,S$GLB,, | Performed by: NURSE PRACTITIONER

## 2022-03-10 PROCEDURE — 85025 COMPLETE CBC W/AUTO DIFF WBC: CPT | Performed by: INTERNAL MEDICINE

## 2022-03-10 PROCEDURE — 3075F SYST BP GE 130 - 139MM HG: CPT | Mod: CPTII,S$GLB,, | Performed by: NURSE PRACTITIONER

## 2022-03-10 PROCEDURE — 99214 OFFICE O/P EST MOD 30 MIN: CPT | Mod: S$GLB,,, | Performed by: NURSE PRACTITIONER

## 2022-03-10 PROCEDURE — 3008F PR BODY MASS INDEX (BMI) DOCUMENTED: ICD-10-PCS | Mod: CPTII,S$GLB,, | Performed by: NURSE PRACTITIONER

## 2022-03-10 PROCEDURE — 3008F BODY MASS INDEX DOCD: CPT | Mod: CPTII,S$GLB,, | Performed by: NURSE PRACTITIONER

## 2022-03-10 PROCEDURE — 84153 ASSAY OF PSA TOTAL: CPT | Performed by: INTERNAL MEDICINE

## 2022-03-10 PROCEDURE — 1160F RVW MEDS BY RX/DR IN RCRD: CPT | Mod: CPTII,S$GLB,, | Performed by: NURSE PRACTITIONER

## 2022-03-10 PROCEDURE — 3079F DIAST BP 80-89 MM HG: CPT | Mod: CPTII,S$GLB,, | Performed by: NURSE PRACTITIONER

## 2022-03-10 PROCEDURE — 84443 ASSAY THYROID STIM HORMONE: CPT | Performed by: INTERNAL MEDICINE

## 2022-03-10 PROCEDURE — 80053 COMPREHEN METABOLIC PANEL: CPT | Performed by: INTERNAL MEDICINE

## 2022-03-10 PROCEDURE — 99999 PR PBB SHADOW E&M-EST. PATIENT-LVL IV: CPT | Mod: PBBFAC,,, | Performed by: NURSE PRACTITIONER

## 2022-03-10 PROCEDURE — 3079F PR MOST RECENT DIASTOLIC BLOOD PRESSURE 80-89 MM HG: ICD-10-PCS | Mod: CPTII,S$GLB,, | Performed by: NURSE PRACTITIONER

## 2022-03-10 PROCEDURE — 85610 PROTHROMBIN TIME: CPT | Performed by: ANESTHESIOLOGY

## 2022-03-10 PROCEDURE — 99999 PR PBB SHADOW E&M-EST. PATIENT-LVL IV: ICD-10-PCS | Mod: PBBFAC,,, | Performed by: NURSE PRACTITIONER

## 2022-03-10 PROCEDURE — 1159F MED LIST DOCD IN RCRD: CPT | Mod: CPTII,S$GLB,, | Performed by: NURSE PRACTITIONER

## 2022-03-10 NOTE — HPI
This is a 61 y.o. male  who presents today for a preoperative evaluation in preparation for an Orthopedic  procedure.  Scheduled for  left knee arthroplasty.   Surgery is indicated for osteoarthritis of left knee.   Patient is new to me.  Details of current problem: The duration of problem is 25 years. 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  Has tried steroid injections/and a knee scope with temporary relief.   Reports symptoms of  sharp, stabbing and throbbing pain to left knee.   Aggravating factors include:  weightbearing activities, prolonged walking and standing; lack of sleep.   Relieving factors are hanging left knee over chair with left leg not touching floor.  Treated with  Tylenol/Ibuprofen/Oxycodone prn.   Reports pain: 4/10 today; cane prn for assistive devices.     The history has been obtained by speaking with the patient and reviewing the electronic medical record and/or outside health information. Significant health conditions for the perioperative period are discussed below in assessment and plan.     Patient reports current health status to be Fair.  Denies any new symptoms before surgery.

## 2022-03-10 NOTE — ASSESSMENT & PLAN NOTE
Quit in October 2021 but admits to smoking a few cigarettes recently- last use two weeks ago.  Advised to quit smoking to decrease risk of infection and delayed healing. There is an increased risk for blood clots, cardiovascular and pulmonary complications.

## 2022-03-10 NOTE — ASSESSMENT & PLAN NOTE
Reports being told he has had a TIA based on MRA brain in 2015. Workup was due to vasovagal syncope. There are no residual deficits.   Control risk factors such as HTN/HLP/Obesity.  Stop smoking; reduce salt intake.

## 2022-03-10 NOTE — PROGRESS NOTES
[facility]  Progress Note    Patient Name: Black Preciado  MRN: [id number]  Date of Evaluation- 03/10/2022  PCP- Manpreet Brewer MD    Future cases for Black Preciado [[id number]]     Case ID Status Date Time Aidan Procedure Provider Location    [id number] [facility] 3/18/2022  7:00  ARTHROPLASTY, KNEE Estela Trevino MD [[id number]] [facility] OR 2ND FLR          HPI:  This is a 61 y.o. male  who presents today for a preoperative evaluation in preparation for an Orthopedic  procedure.  Scheduled for  left knee arthroplasty.   Surgery is indicated for osteoarthritis of left knee.   Patient is new to me.  Details of current problem: The duration of problem is 25 years. 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  Has tried steroid injections/and a knee scope with temporary relief.   Reports symptoms of  sharp, stabbing and throbbing pain to left knee.   Aggravating factors include:  weightbearing activities, prolonged walking and standing; lack of sleep.   Relieving factors are hanging left knee over chair with left leg not touching floor.  Treated with  Tylenol/Ibuprofen/Oxycodone prn.   Reports pain: 4/10 today; cane prn for assistive devices.     The history has been obtained by speaking with the patient and reviewing the electronic medical record and/or outside health information. Significant health conditions for the perioperative period are discussed below in assessment and plan.     Patient reports current health status to be Fair.  Denies any new symptoms before surgery.          Subjective/ Objective:     Chief Complaint: Preoperative evaulation, perioperative medical management, and complication reduction plan.     Functional Capacity: rides bike 1/2 mile 5x weekly; walks dog daily without CP/SOB.        Anesthesia issues: sore throat with recent sinus surgery 2/21/22.    Difficulty mouth opening: No    Steroid use in the last 12 months: Yes-  Medrol Dose [place]     Dental Issues: No    Family anesthesia difficulty: None       Family Hx of Thrombosis: None    Past Medical History:   Diagnosis Date    Hyperlipidemia     Hypertension     Major depression, single episode 9/13/2013    Morbid obesity with BMI of 40.0-44.9, adult 7/18/2016    AUREA (obstructive sleep apnea)     Primary osteoarthritis of left knee 7/18/2016    Screening for colorectal cancer 12/13/2018    Normal 2014 -10 yrs    Sinus tanja-tachy syndrome 7/15/2015    Syncope and collapse     vasovagal -sees EP Cards         Past Medical History Pertinent Negatives:   Diagnosis Date Noted    ADHD (attention deficit hyperactivity disorder) 09/13/2013    Amblyopia 05/13/2013    Anemia 03/10/2022    Anticoagulant long-term use 07/15/2015    Asthma 07/15/2015    Bipolar disorder 09/13/2013    Cancer 07/15/2015    Cataract 05/13/2013    Cataract 08/14/2014    CHF (congestive heart failure) 07/15/2015    COPD (chronic obstructive pulmonary disease) 07/15/2015    Coronary artery disease 07/15/2015    Deep vein thrombosis 03/10/2022    Diabetes mellitus 05/13/2013    Diabetes mellitus 08/14/2014    Diabetes mellitus, type 2 03/10/2022    Diabetic retinopathy 05/13/2013    Diabetic retinopathy 08/14/2014    Encounter for blood transfusion 07/15/2015    GERD (gastroesophageal reflux disease) 03/10/2022    Glaucoma 05/13/2013    Glaucoma 08/14/2014    History of psychiatric hospitalization 09/13/2013    Macular degeneration 05/13/2013    Jennifer 09/13/2013    Psychiatric exam 09/13/2013    Retinal detachment 05/13/2013    Seizures 07/15/2015    Self-harming behavior 09/13/2013    Strabismus 05/13/2013    Suicide attempt 09/13/2013    Therapy 09/13/2013    Thyroid disease 07/15/2015    Uveitis 05/13/2013         Past Surgical History:   Procedure Laterality Date    INSERTION OF PACEMAKER  11/03/2020    knee scope      REMOVAL OF IMPLANTABLE LOOP RECORDER N/A 3/2/2020     "Procedure: REMOVAL, IMPLANTABLE LOOP RECORDER;  Surgeon: Cy Villeda MD;  Location: [facility] EP LAB;  Service: Cardiology;  Laterality: N/A;  EOC, ILR Removal, RN Sedation, DM, 3 Prep    SINUS SURGERY  02/21/2022       Review of Systems   Constitutional: Negative for chills, fatigue, fever and unexpected weight change.   HENT: Negative for dental problem, hearing loss, postnasal drip, rhinorrhea, sore throat, tinnitus and trouble swallowing.         Popping ears  Recent sinus surgery- sinuplasty 2/21/22. Doing well   Eyes: Negative for photophobia, pain, discharge and visual disturbance.        Watery eyes   Respiratory: Positive for wheezing (occasional- at night). Negative for apnea, cough, chest tightness and shortness of breath.    Cardiovascular: Negative for chest pain, palpitations and leg swelling.   Gastrointestinal: Negative for abdominal pain, blood in stool, constipation, nausea and vomiting.        Denies Fatty liver, Hepatitis   Endocrine: Negative for cold intolerance, heat intolerance, polydipsia, polyphagia and polyuria.    Genitourinary: Positive for frequency. Negative for decreased urine volume, difficulty urinating, dysuria, hematuria and urgency.        Nocturia 2-3x  incontinence   Musculoskeletal: Positive for arthralgias (left knee). Negative for back pain, neck pain and neck stiffness.   Skin: Negative for rash and wound.   Allergic/Immunologic: Negative for immunocompromised state.   Neurological: Positive for dizziness (occasional) and numbness (BUE- while riding bike). Negative for tremors, seizures, syncope, weakness and headaches.   Hematological: Negative for adenopathy. Does not bruise/bleed easily.   Psychiatric/Behavioral: Negative for confusion, hallucinations, sleep disturbance and suicidal ideas.          VITALS  Visit Vitals  /81   Pulse 90   Temp 98.2 °F (36.8 °C) (Oral)   Resp 18   Ht 5' 10" (1.778 m)   Wt 129.7 kg (286 lb)   SpO2 98%   BMI 41.04 kg/m²    "       Physical Exam  Vitals reviewed.   Constitutional:       General: He is not in acute distress.     Appearance: He is well-developed. He is morbidly obese.   HENT:      Head: Normocephalic.      Nose: Nose normal.      Mouth/Throat:      Pharynx: No oropharyngeal exudate.   Eyes:      General:         Right eye: No discharge.         Left eye: No discharge.      Conjunctiva/sclera: Conjunctivae normal.      Pupils: Pupils are equal, round, and reactive to light.   Neck:      Thyroid: No thyromegaly.      Vascular: No carotid bruit or JVD.      Trachea: No tracheal deviation.   Cardiovascular:      Rate and Rhythm: Normal rate and regular rhythm.      Pulses:           Carotid pulses are 2+ on the right side and 2+ on the left side.       Dorsalis pedis pulses are 2+ on the right side and 2+ on the left side.        Posterior tibial pulses are 2+ on the right side and 2+ on the left side.      Heart sounds: Normal heart sounds. No murmur heard.  Pulmonary:      Effort: Pulmonary effort is normal. No respiratory distress.      Breath sounds: Normal breath sounds. No stridor. No wheezing, rhonchi or rales.   Abdominal:      General: Bowel sounds are normal. There is no distension.      Palpations: Abdomen is soft.      Tenderness: There is no abdominal tenderness. There is no guarding.   Musculoskeletal:      Cervical back: Normal range of motion.      Right lower leg: No edema.      Left lower leg: No edema.   Lymphadenopathy:      Cervical: No cervical adenopathy.    Skin:     General: Skin is warm and dry.      Capillary Refill: Capillary refill takes less than 2 seconds.      Findings: No erythema or rash.   Neurological:      Mental Status: He is alert and oriented to person, place, and time.      Coordination: Coordination normal.          Significant Labs:  Lab Results   Component Value Date    WBC 6.99 03/10/2022    WBC 6.99 03/10/2022    HGB 14.6 03/10/2022    HGB 14.6 03/10/2022    HCT 44.8 03/10/2022     HCT 44.8 03/10/2022     03/10/2022     03/10/2022    CHOL 145 02/15/2021    TRIG 90 02/15/2021    HDL 48 02/15/2021    ALT 62 (H) 03/10/2022    AST 39 03/10/2022     03/10/2022     03/10/2022    K 3.5 03/10/2022    K 3.5 03/10/2022    CL 98 03/10/2022    CL 98 03/10/2022    CREATININE 0.9 03/10/2022    CREATININE 0.9 03/10/2022    BUN 10 03/10/2022    BUN 10 03/10/2022    CO2 28 03/10/2022    CO2 28 03/10/2022    TSH 1.491 03/10/2022    PSA 0.34 12/14/2018    INR 1.0 03/10/2022    HGBA1C 5.7 (H) 02/15/2021       Diagnostic Studies: No relevant studies.    EKG: Outside 2/7/22- scanned in media      2D ECHO: 11/2020- In Care Everywhere  TTE:      CONCLUSIONS     1. Normal left ventricular cavity size and systolic function. Left ventricular ejection fraction is estimated at 65-70%.       2. Normal diastolic function     3. Normal right ventricular size and systolic function.     4. No significant chamber abnormalities.       5. No significant valve abnormalities.          Cristo Atkins    (Electronically Signed)     Final Date: 03 November 2020 11:06       Nuclear Stress 12/2020- scanned in [place]        Imaging   Old carotid US 2/11/15  IMPRESSION:    No significant plaque formation or thickening of the intima in either side.  The Doppler spectral analysis indicates no significant flow restricting the abnormality in either side.        Electronically signed by: SASHA DC MD  Date:                                            02/11/15  Time:                                           14:30             Active Cardiac Conditions: None      Revised Cardiac Risk Index   High -Risk Surgery  Intraperitoneal; Intrathoracic; suprainguinal vascular Yes- + 1 No- 0   History of Ischemic Heart Disease   (Hx of MI/positive exercise test/current chest pain due to ischemia/use of nitrate therapy/EKG with pathological Q waves) Yes- + 1 No- 0   History of CHF  (Pulmonary edema/bilateral rales or S3  gallop/PND/CXR showing pulmonary vascular redistribution) Yes- + 1 No- 0   History of CVA   (Prior stroke or TIA) Yes- + 1 No- 0   Pre-operative treatment with insulin Yes- + 1 No- 0   Pre-operative creatinine > 2mg/dl Yes- + 1 No- 0   Total: 1      Risk Status:  Estimated risk of cardiac complications after non-cardiac surgery using the Revised Cardiac Risk Index for Preoperative risk is 6.0 %      ARISCAT ([place]) risk index: Intermediate: 13.3% risk of post-op pulmonary complications.    American Society of Anesthesiologists Physical Status classification (ASA): 3    [facility] respiratory failure index: 0.5 %           No further cardiac workup needed prior to surgery.                   Assessment/Plan:     Pure hypercholesterolemia  Encouraged weight loss, healthy diet (DASH/Mediterranean) and exercise. Patient should exercise 30 minutes at least five times weekly. Limit alcohol.  Treated with: atorvastatin     NSVT (nonsustained ventricular tachycardia)  Followed per outside cardiology (Andreia); optimized for surgery 3/2/22- scanned in media.  Has Medtronic pacemaker for Sick Sinus Syndrome    Essential hypertension  Current BP  controlled today.  Taking: chlorthalidone  No home BP readings.  Encouraged keeping a healthy weight and BMI  Lifestyle changes to reduce systolic BP:   exercise 30 minutes per day,  5 days per week or 150 minutes weekly; sodium reduction and avoidance of high salt foods such as processed meats, frozen meals and  fast foods.     BP acceptable for surgery. I recommend monitoring BP during perioperative period as well as uncontrolled pain which can elevated blood pressure.           Morbid obesity with BMI of 40.0-44.9, adult  Lifestyle changes should be made by eating healthy, exercising at least 150 minutes weekly, and avoiding sedentary behavior.       Primary osteoarthritis of left knee  Scheduled with Dr. Trevino on 3/18/22 for left knee arthroplasty.    Vasovagal  "syncope  Patient reports heart stopped in 2015.  Resolved since pacemaker placement  Had Tilt test in 2015- normal response      Tobacco dependence  Quit in October 2021 but admits to smoking a few cigarettes recently- last use two weeks ago.  Advised to quit smoking to decrease risk of infection and delayed healing. There is an increased risk for blood clots, cardiovascular and pulmonary complications.     AUREA (obstructive sleep apnea)  CPAP compliance: No. Reason for non-compliance: I couldn't get used to it."     Encouraged weight loss, increased exercise.   Recommend caution with sedating medication that can cause respiratory depression. Patients with untreated AUREA have an increased risk of stroke, HTN, and heart disease.        Lower urinary tract symptoms (LUTS)  Referred to Urology per PCP- prefers to wait until after surgery.  LUTS: frequency/nocturia/incontinence  Denies BPH    PSA 3/10/22  PSA Diagnostic 0.00 - 4.00 ng/mL 0.72          Personal history of TIA (transient ischemic attack)  Reports being told he has had a TIA based on MRA brain in 2015. Workup was due to vasovagal syncope. There are no residual deficits.   Control risk factors such as HTN/HLP/Obesity.  Stop smoking; reduce salt intake.        Prediabetes  A1c is 5.7 on 2/2021 labs  I recommend monitoring patient's glucose level during the perioperative period. Glucose may be elevated from stress hyperglycemia and may require insulin.        Discussion/Management of Perioperative Care    Thromboembolic prophylaxis (VTE) Care: Risk factors for thrombosis include: morbid obesity, surgical procedure and tobacco use.  I recommend prophylaxis of thromboembolism with the use of compression stockings/pneumatic devices, and/or pharmacologic agents. The benefits should outweigh the risks for pharmacologic prophylaxis in the perioperative period. I also encourage early ambulation if not contraindicated during the post-operative period.    Risk factors " for post-operative pulmonary complications include:functional dependence, surgery > 3 hours, tobacco use and vascular disease. To reduce the risk of pulmonary complications, prophylactic recommendations include: smoking cessation, incentive spirometry use/deep breathing, early ambulation and pain control.    Risk factors for renal complications include: HTN. To reduce the risk of postoperative renal complications, I recommend the patient maintain adequate fluid volume status by drinking 2 liters of water daily.  Avoid/reduce NSAIDS and CARRILLO-2 inhibitors use as well as IV contrast for renal protection.    I recommend the use of appropriate prophylactic antibiotics to reduce the risk of surgical site infections.    The patient is at an increased risk for urinary retention due to : BPH/LUTS, possible regional anesthesia and advanced age. I recommend to avoid/decrease the use of benzodiazepines, anticholinergics, and Benadryl in the perioperative period. I also recommend using opioids for the shortest period of time if possible.          This visit was focused on Preoperative evaluation, Perioperative Medical management, complication reduction plans. I suggest that the patient follows up with primary care or relevant sub specialists for ongoing health care.    I appreciate the opportunity to be involved in this patients care. Please feel free to contact me if there were any questions about this consultation.    Patient is optimized for surgery.    Cleared per outside cardiology (Andreia)- scanned in media along with most recent EKG/Stress test/TTE and clinic visit note.     Yaritza Arreola NP  Perioperative Medicine  [facility]

## 2022-03-10 NOTE — ASSESSMENT & PLAN NOTE
Referred to Urology per PCP- prefers to wait until after surgery.  LUTS: frequency/nocturia/incontinence  Denies BPH    PSA 3/10/22  PSA Diagnostic 0.00 - 4.00 ng/mL 0.72

## 2022-03-10 NOTE — OUTPATIENT SUBJECTIVE & OBJECTIVE
Outpatient Subjective & Objective      Chief Complaint: Preoperative evaulation, perioperative medical management, and complication reduction plan.     Functional Capacity: rides bike 1/2 mile 5x weekly; walks dog daily without CP/SOB.        Anesthesia issues: sore throat with recent sinus surgery 2/21/22.    Difficulty mouth opening: No    Steroid use in the last 12 months: Yes- Medrol Dose [place]     Dental Issues: No    Family anesthesia difficulty: None       Family Hx of Thrombosis: None    Past Medical History:   Diagnosis Date    Hyperlipidemia     Hypertension     Major depression, single episode 9/13/2013    Morbid obesity with BMI of 40.0-44.9, adult 7/18/2016    AUREA (obstructive sleep apnea)     Primary osteoarthritis of left knee 7/18/2016    Screening for colorectal cancer 12/13/2018    Normal 2014 -10 yrs    Sinus tanja-tachy syndrome 7/15/2015    Syncope and collapse     vasovagal -sees EP Cards         Past Medical History Pertinent Negatives:   Diagnosis Date Noted    ADHD (attention deficit hyperactivity disorder) 09/13/2013    Amblyopia 05/13/2013    Anemia 03/10/2022    Anticoagulant long-term use 07/15/2015    Asthma 07/15/2015    Bipolar disorder 09/13/2013    Cancer 07/15/2015    Cataract 05/13/2013    Cataract 08/14/2014    CHF (congestive heart failure) 07/15/2015    COPD (chronic obstructive pulmonary disease) 07/15/2015    Coronary artery disease 07/15/2015    Deep vein thrombosis 03/10/2022    Diabetes mellitus 05/13/2013    Diabetes mellitus 08/14/2014    Diabetes mellitus, type 2 03/10/2022    Diabetic retinopathy 05/13/2013    Diabetic retinopathy 08/14/2014    Encounter for blood transfusion 07/15/2015    GERD (gastroesophageal reflux disease) 03/10/2022    Glaucoma 05/13/2013    Glaucoma 08/14/2014    History of psychiatric hospitalization 09/13/2013    Macular degeneration 05/13/2013    Jennifer 09/13/2013    Psychiatric exam 09/13/2013    Retinal  detachment 05/13/2013    Seizures 07/15/2015    Self-harming behavior 09/13/2013    Strabismus 05/13/2013    Suicide attempt 09/13/2013    Therapy 09/13/2013    Thyroid disease 07/15/2015    Uveitis 05/13/2013         Past Surgical History:   Procedure Laterality Date    INSERTION OF PACEMAKER  11/03/2020    knee scope      REMOVAL OF IMPLANTABLE LOOP RECORDER N/A 3/2/2020    Procedure: REMOVAL, IMPLANTABLE LOOP RECORDER;  Surgeon: Cy Villeda MD;  Location: [facility];  Service: Cardiology;  Laterality: N/A;  EOC, ILR Removal, RN Sedation, DM, 3 Prep    SINUS SURGERY  02/21/2022       Review of Systems   Constitutional: Negative for chills, fatigue, fever and unexpected weight change.   HENT: Negative for dental problem, hearing loss, postnasal drip, rhinorrhea, sore throat, tinnitus and trouble swallowing.         Popping ears  Recent sinus surgery- sinuplasty 2/21/22. Doing well   Eyes: Negative for photophobia, pain, discharge and visual disturbance.        Watery eyes   Respiratory: Positive for wheezing (occasional- at night). Negative for apnea, cough, chest tightness and shortness of breath.    Cardiovascular: Negative for chest pain, palpitations and leg swelling.   Gastrointestinal: Negative for abdominal pain, blood in stool, constipation, nausea and vomiting.        Denies Fatty liver, Hepatitis   Endocrine: Negative for cold intolerance, heat intolerance, polydipsia, polyphagia and polyuria.   Genitourinary: Positive for frequency. Negative for decreased urine volume, difficulty urinating, dysuria, hematuria and urgency.        Nocturia 2-3x  incontinence   Musculoskeletal: Positive for arthralgias (left knee). Negative for back pain, neck pain and neck stiffness.   Skin: Negative for rash and wound.   Allergic/Immunologic: Negative for immunocompromised state.   Neurological: Positive for dizziness (occasional) and numbness (BUE- while riding bike). Negative for tremors, seizures,  "syncope, weakness and headaches.   Hematological: Negative for adenopathy. Does not bruise/bleed easily.   Psychiatric/Behavioral: Negative for confusion, hallucinations, sleep disturbance and suicidal ideas.          VITALS  Visit Vitals  /81   Pulse 90   Temp 98.2 °F (36.8 °C) (Oral)   Resp 18   Ht 5' 10" (1.778 m)   Wt 129.7 kg (286 lb)   SpO2 98%   BMI 41.04 kg/m²          Physical Exam  Vitals reviewed.   Constitutional:       General: He is not in acute distress.     Appearance: He is well-developed. He is morbidly obese.   HENT:      Head: Normocephalic.      Nose: Nose normal.      Mouth/Throat:      Pharynx: No oropharyngeal exudate.   Eyes:      General:         Right eye: No discharge.         Left eye: No discharge.      Conjunctiva/sclera: Conjunctivae normal.      Pupils: Pupils are equal, round, and reactive to light.   Neck:      Thyroid: No thyromegaly.      Vascular: No carotid bruit or JVD.      Trachea: No tracheal deviation.   Cardiovascular:      Rate and Rhythm: Normal rate and regular rhythm.      Pulses:           Carotid pulses are 2+ on the right side and 2+ on the left side.       Dorsalis pedis pulses are 2+ on the right side and 2+ on the left side.        Posterior tibial pulses are 2+ on the right side and 2+ on the left side.      Heart sounds: Normal heart sounds. No murmur heard.  Pulmonary:      Effort: Pulmonary effort is normal. No respiratory distress.      Breath sounds: Normal breath sounds. No stridor. No wheezing, rhonchi or rales.   Abdominal:      General: Bowel sounds are normal. There is no distension.      Palpations: Abdomen is soft.      Tenderness: There is no abdominal tenderness. There is no guarding.   Musculoskeletal:      Cervical back: Normal range of motion.      Right lower leg: No edema.      Left lower leg: No edema.   Lymphadenopathy:      Cervical: No cervical adenopathy.   Skin:     General: Skin is warm and dry.      Capillary Refill: Capillary " refill takes less than 2 seconds.      Findings: No erythema or rash.   Neurological:      Mental Status: He is alert and oriented to person, place, and time.      Coordination: Coordination normal.          Significant Labs:  Lab Results   Component Value Date    WBC 6.99 03/10/2022    WBC 6.99 03/10/2022    HGB 14.6 03/10/2022    HGB 14.6 03/10/2022    HCT 44.8 03/10/2022    HCT 44.8 03/10/2022     03/10/2022     03/10/2022    CHOL 145 02/15/2021    TRIG 90 02/15/2021    HDL 48 02/15/2021    ALT 62 (H) 03/10/2022    AST 39 03/10/2022     03/10/2022     03/10/2022    K 3.5 03/10/2022    K 3.5 03/10/2022    CL 98 03/10/2022    CL 98 03/10/2022    CREATININE 0.9 03/10/2022    CREATININE 0.9 03/10/2022    BUN 10 03/10/2022    BUN 10 03/10/2022    CO2 28 03/10/2022    CO2 28 03/10/2022    TSH 1.491 03/10/2022    PSA 0.34 12/14/2018    INR 1.0 03/10/2022    HGBA1C 5.7 (H) 02/15/2021       Diagnostic Studies: No relevant studies.    EKG: Outside 2/7/22- scanned in media      2D ECHO: 11/2020- In Care Everywhere  TTE:      CONCLUSIONS     1. Normal left ventricular cavity size and systolic function. Left ventricular ejection fraction is estimated at 65-70%.       2. Normal diastolic function     3. Normal right ventricular size and systolic function.     4. No significant chamber abnormalities.       5. No significant valve abnormalities.          Cristo Atkins    (Electronically Signed)     Final Date: 03 November 2020 11:06       Nuclear Stress 12/2020- scanned in [place]        Imaging   Old carotid US 2/11/15  IMPRESSION:    No significant plaque formation or thickening of the intima in either side.  The Doppler spectral analysis indicates no significant flow restricting the abnormality in either side.        Electronically signed by: SASHA DC MD  Date:                                            02/11/15  Time:                                           14:30             Active Cardiac  Conditions: None      Revised Cardiac Risk Index   High -Risk Surgery  Intraperitoneal; Intrathoracic; suprainguinal vascular Yes- + 1 No- 0   History of Ischemic Heart Disease   (Hx of MI/positive exercise test/current chest pain due to ischemia/use of nitrate therapy/EKG with pathological Q waves) Yes- + 1 No- 0   History of CHF  (Pulmonary edema/bilateral rales or S3 gallop/PND/CXR showing pulmonary vascular redistribution) Yes- + 1 No- 0   History of CVA   (Prior stroke or TIA) Yes- + 1 No- 0   Pre-operative treatment with insulin Yes- + 1 No- 0   Pre-operative creatinine > 2mg/dl Yes- + 1 No- 0   Total: 1      Risk Status:  Estimated risk of cardiac complications after non-cardiac surgery using the Revised Cardiac Risk Index for Preoperative risk is 6.0 %      ARISCAT (Canet) risk index: Intermediate: 13.3% risk of post-op pulmonary complications.    American Society of Anesthesiologists Physical Status classification (ASA): 3    [facility] respiratory failure index: 0.5 %           No further cardiac workup needed prior to surgery.    Outpatient Subjective & Objective

## 2022-03-10 NOTE — ASSESSMENT & PLAN NOTE
"CPAP compliance: No. Reason for non-compliance: I couldn't get used to it."     Encouraged weight loss, increased exercise.   Recommend caution with sedating medication that can cause respiratory depression. Patients with untreated AUREA have an increased risk of stroke, HTN, and heart disease.      "

## 2022-03-10 NOTE — ASSESSMENT & PLAN NOTE
Patient reports heart stopped in 2015.  Resolved since pacemaker placement  Had Tilt test in 2015- normal response

## 2022-03-10 NOTE — ASSESSMENT & PLAN NOTE
Encouraged weight loss, healthy diet (DASH/Mediterranean) and exercise. Patient should exercise 30 minutes at least five times weekly. Limit alcohol.  Treated with: atorvastatin

## 2022-03-10 NOTE — ASSESSMENT & PLAN NOTE
Followed per outside cardiology (Andreia); optimized for surgery 3/2/22- scanned in media.  Has Medtronic pacemaker for Sick Sinus Syndrome

## 2022-03-10 NOTE — ASSESSMENT & PLAN NOTE
A1c is 5.7 on 2/2021 labs  I recommend monitoring patient's glucose level during the perioperative period. Glucose may be elevated from stress hyperglycemia and may require insulin.

## 2022-03-10 NOTE — ASSESSMENT & PLAN NOTE
Current BP  controlled today.  Taking: chlorthalidone  No home BP readings.  Encouraged keeping a healthy weight and BMI  Lifestyle changes to reduce systolic BP:   exercise 30 minutes per day,  5 days per week or 150 minutes weekly; sodium reduction and avoidance of high salt foods such as processed meats, frozen meals and  fast foods.     BP acceptable for surgery. I recommend monitoring BP during perioperative period as well as uncontrolled pain which can elevated blood pressure.

## 2022-03-10 NOTE — ASSESSMENT & PLAN NOTE
Lifestyle changes should be made by eating healthy, exercising at least 150 minutes weekly, and avoiding sedentary behavior.

## 2022-03-11 RX ORDER — ASPIRIN 81 MG/1
81 TABLET ORAL DAILY
COMMUNITY

## 2022-03-11 NOTE — PRE-PROCEDURE INSTRUCTIONS
PreOp Instructions given:    >>Medication information (what to hold and what to take)    NPO guidelines as follows: (or as per your Surgeon)  -- Stop ALL solid food, gum, candy (including vitamins) past midnight  -- If you are told to take medication on the morning of surgery, it may be taken with a sip of water.   -- Arrival place and directions given; time to be given the day before procedure by the Surgeon's Office   -- Bathing with antibacterial soap   -- Don't wear any jewelry or bring any valuables AM of surgery   -- No makeup or moisturizer to face   -- No perfume/cologne, powder, lotions or aftershave     Pt verbalized understanding.

## 2022-03-16 ENCOUNTER — TELEPHONE (OUTPATIENT)
Dept: SPORTS MEDICINE | Facility: CLINIC | Age: 62
End: 2022-03-16
Payer: MEDICARE

## 2022-03-16 ENCOUNTER — LAB VISIT (OUTPATIENT)
Dept: PRIMARY CARE CLINIC | Facility: CLINIC | Age: 62
End: 2022-03-16
Payer: MEDICARE

## 2022-03-16 DIAGNOSIS — Z01.818 PREOP TESTING: ICD-10-CM

## 2022-03-16 LAB
SARS-COV-2 RNA RESP QL NAA+PROBE: NOT DETECTED
SARS-COV-2- CYCLE NUMBER: NORMAL

## 2022-03-16 PROCEDURE — U0005 INFEC AGEN DETEC AMPLI PROBE: HCPCS | Performed by: ORTHOPAEDIC SURGERY

## 2022-03-16 PROCEDURE — U0003 INFECTIOUS AGENT DETECTION BY NUCLEIC ACID (DNA OR RNA); SEVERE ACUTE RESPIRATORY SYNDROME CORONAVIRUS 2 (SARS-COV-2) (CORONAVIRUS DISEASE [COVID-19]), AMPLIFIED PROBE TECHNIQUE, MAKING USE OF HIGH THROUGHPUT TECHNOLOGIES AS DESCRIBED BY CMS-2020-01-R: HCPCS | Performed by: ORTHOPAEDIC SURGERY

## 2022-03-16 NOTE — TELEPHONE ENCOUNTER
Spoke with patient and clarified that we have his clearances and his arrival time is 5am on Friday at [facility] 2nd floor.     Mary Salguero   Clinical Assistant to Dr. Estela Trevino    ----- Message from Teressa Lara sent at 3/16/2022  9:20 AM CDT -----  Regarding: Clearance  Contact: pt @ [phone]  Patient calling to see if office received clearance from Cardiologist to have upcoming surgery. Pt would like a call from office

## 2022-03-17 ENCOUNTER — ANESTHESIA EVENT (OUTPATIENT)
Dept: SURGERY | Facility: HOSPITAL | Age: 62
End: 2022-03-17
Payer: MEDICARE

## 2022-03-18 ENCOUNTER — HOSPITAL ENCOUNTER (OUTPATIENT)
Facility: HOSPITAL | Age: 62
Discharge: HOME | End: 2022-03-18
Attending: ORTHOPAEDIC SURGERY | Admitting: ORTHOPAEDIC SURGERY
Payer: MEDICARE

## 2022-03-18 ENCOUNTER — PATIENT MESSAGE (OUTPATIENT)
Dept: ADMINISTRATIVE | Facility: OTHER | Age: 62
End: 2022-03-18
Payer: MEDICARE

## 2022-03-18 ENCOUNTER — ANESTHESIA (OUTPATIENT)
Dept: SURGERY | Facility: HOSPITAL | Age: 62
End: 2022-03-18
Payer: MEDICARE

## 2022-03-18 ENCOUNTER — DOCUMENTATION ONLY (OUTPATIENT)
Dept: ELECTROPHYSIOLOGY | Facility: CLINIC | Age: 62
End: 2022-03-18
Payer: MEDICARE

## 2022-03-18 VITALS
DIASTOLIC BLOOD PRESSURE: 56 MMHG | SYSTOLIC BLOOD PRESSURE: 111 MMHG | WEIGHT: 285 LBS | BODY MASS INDEX: 40.8 KG/M2 | OXYGEN SATURATION: 95 % | RESPIRATION RATE: 18 BRPM | HEIGHT: 70 IN | TEMPERATURE: 98 F | HEART RATE: 60 BPM

## 2022-03-18 DIAGNOSIS — M21.162 GENU VARUM, ACQUIRED, LEFT: ICD-10-CM

## 2022-03-18 DIAGNOSIS — M17.12 PRIMARY OSTEOARTHRITIS OF LEFT KNEE: ICD-10-CM

## 2022-03-18 PROCEDURE — 76942 ECHO GUIDE FOR BIOPSY: CPT | Mod: 26,,, | Performed by: ANESTHESIOLOGY

## 2022-03-18 PROCEDURE — 71000033 HC RECOVERY, INTIAL HOUR: Performed by: ORTHOPAEDIC SURGERY

## 2022-03-18 PROCEDURE — 27000221 HC OXYGEN, UP TO 24 HOURS

## 2022-03-18 PROCEDURE — 64448 NJX AA&/STRD FEM NRV NFS IMG: CPT | Mod: 59,LT | Performed by: STUDENT IN AN ORGANIZED HEALTH CARE EDUCATION/TRAINING PROGRAM

## 2022-03-18 PROCEDURE — 97165 OT EVAL LOW COMPLEX 30 MIN: CPT

## 2022-03-18 PROCEDURE — 64448 L ADDUCTOR CANAL CATHETER: ICD-10-PCS | Mod: 59,LT,, | Performed by: ANESTHESIOLOGY

## 2022-03-18 PROCEDURE — 63600175 PHARM REV CODE 636 W HCPCS: Performed by: STUDENT IN AN ORGANIZED HEALTH CARE EDUCATION/TRAINING PROGRAM

## 2022-03-18 PROCEDURE — 64448 NJX AA&/STRD FEM NRV NFS IMG: CPT | Mod: 59,LT,, | Performed by: ANESTHESIOLOGY

## 2022-03-18 PROCEDURE — 63600175 PHARM REV CODE 636 W HCPCS: Performed by: PHYSICIAN ASSISTANT

## 2022-03-18 PROCEDURE — 36000710: Performed by: ORTHOPAEDIC SURGERY

## 2022-03-18 PROCEDURE — 63600175 PHARM REV CODE 636 W HCPCS: Performed by: ANESTHESIOLOGY

## 2022-03-18 PROCEDURE — 71000016 HC POSTOP RECOV ADDL HR: Performed by: ORTHOPAEDIC SURGERY

## 2022-03-18 PROCEDURE — 97161 PT EVAL LOW COMPLEX 20 MIN: CPT

## 2022-03-18 PROCEDURE — 71000015 HC POSTOP RECOV 1ST HR: Performed by: ORTHOPAEDIC SURGERY

## 2022-03-18 PROCEDURE — 94799 UNLISTED PULMONARY SVC/PX: CPT

## 2022-03-18 PROCEDURE — 88304 TISSUE EXAM BY PATHOLOGIST: CPT | Mod: 26,,, | Performed by: PATHOLOGY

## 2022-03-18 PROCEDURE — D9220A PRA ANESTHESIA: ICD-10-PCS | Mod: ,,, | Performed by: ANESTHESIOLOGY

## 2022-03-18 PROCEDURE — 27447 PR TOTAL KNEE ARTHROPLASTY: ICD-10-PCS | Mod: LT,,, | Performed by: ORTHOPAEDIC SURGERY

## 2022-03-18 PROCEDURE — D9220A PRA ANESTHESIA: Mod: ,,, | Performed by: ANESTHESIOLOGY

## 2022-03-18 PROCEDURE — C1713 ANCHOR/SCREW BN/BN,TIS/BN: HCPCS | Performed by: ORTHOPAEDIC SURGERY

## 2022-03-18 PROCEDURE — 27201423 OPTIME MED/SURG SUP & DEVICES STERILE SUPPLY: Performed by: ORTHOPAEDIC SURGERY

## 2022-03-18 PROCEDURE — 88311 DECALCIFY TISSUE: CPT | Performed by: PATHOLOGY

## 2022-03-18 PROCEDURE — 88304 PR  SURG PATH,LEVEL III: ICD-10-PCS | Mod: 26,,, | Performed by: PATHOLOGY

## 2022-03-18 PROCEDURE — 25000003 PHARM REV CODE 250: Performed by: STUDENT IN AN ORGANIZED HEALTH CARE EDUCATION/TRAINING PROGRAM

## 2022-03-18 PROCEDURE — 36000711: Performed by: ORTHOPAEDIC SURGERY

## 2022-03-18 PROCEDURE — 63600175 PHARM REV CODE 636 W HCPCS: Performed by: NURSE PRACTITIONER

## 2022-03-18 PROCEDURE — 25000003 PHARM REV CODE 250: Performed by: PHYSICIAN ASSISTANT

## 2022-03-18 PROCEDURE — 97530 THERAPEUTIC ACTIVITIES: CPT

## 2022-03-18 PROCEDURE — C1776 JOINT DEVICE (IMPLANTABLE): HCPCS | Performed by: ORTHOPAEDIC SURGERY

## 2022-03-18 PROCEDURE — 94761 N-INVAS EAR/PLS OXIMETRY MLT: CPT

## 2022-03-18 PROCEDURE — 76942 L ADDUCTOR CANAL CATHETER: ICD-10-PCS | Mod: 26,,, | Performed by: ANESTHESIOLOGY

## 2022-03-18 PROCEDURE — 27447 TOTAL KNEE ARTHROPLASTY: CPT | Mod: LT,,, | Performed by: ORTHOPAEDIC SURGERY

## 2022-03-18 PROCEDURE — 88304 TISSUE EXAM BY PATHOLOGIST: CPT | Performed by: PATHOLOGY

## 2022-03-18 PROCEDURE — 37000008 HC ANESTHESIA 1ST 15 MINUTES: Performed by: ORTHOPAEDIC SURGERY

## 2022-03-18 PROCEDURE — 97535 SELF CARE MNGMENT TRAINING: CPT

## 2022-03-18 PROCEDURE — 37000009 HC ANESTHESIA EA ADD 15 MINS: Performed by: ORTHOPAEDIC SURGERY

## 2022-03-18 DEVICE — CEMENT BONE SMPLX HV GENTMYCN: Type: IMPLANTABLE DEVICE | Site: KNEE | Status: FUNCTIONAL

## 2022-03-18 DEVICE — IMPLANTABLE DEVICE: Type: IMPLANTABLE DEVICE | Site: KNEE | Status: FUNCTIONAL

## 2022-03-18 RX ORDER — ACETAMINOPHEN 500 MG
1000 TABLET ORAL EVERY 6 HOURS
Status: DISCONTINUED | OUTPATIENT
Start: 2022-03-18 | End: 2022-03-18 | Stop reason: HOSPADM

## 2022-03-18 RX ORDER — CELECOXIB 200 MG/1
400 CAPSULE ORAL
Status: COMPLETED | OUTPATIENT
Start: 2022-03-18 | End: 2022-03-18

## 2022-03-18 RX ORDER — PREGABALIN 75 MG/1
75 CAPSULE ORAL
Status: COMPLETED | OUTPATIENT
Start: 2022-03-18 | End: 2022-03-18

## 2022-03-18 RX ORDER — ACETAMINOPHEN 500 MG
1000 TABLET ORAL
Status: COMPLETED | OUTPATIENT
Start: 2022-03-18 | End: 2022-03-18

## 2022-03-18 RX ORDER — OXYCODONE HYDROCHLORIDE 5 MG/1
5 TABLET ORAL
Status: DISCONTINUED | OUTPATIENT
Start: 2022-03-18 | End: 2022-03-18 | Stop reason: HOSPADM

## 2022-03-18 RX ORDER — LIDOCAINE HYDROCHLORIDE 10 MG/ML
1 INJECTION, SOLUTION EPIDURAL; INFILTRATION; INTRACAUDAL; PERINEURAL
Status: DISCONTINUED | OUTPATIENT
Start: 2022-03-18 | End: 2022-03-18 | Stop reason: HOSPADM

## 2022-03-18 RX ORDER — MUPIROCIN 20 MG/G
1 OINTMENT TOPICAL
Status: COMPLETED | OUTPATIENT
Start: 2022-03-18 | End: 2022-03-18

## 2022-03-18 RX ORDER — KETAMINE HCL IN 0.9 % NACL 50 MG/5 ML
SYRINGE (ML) INTRAVENOUS
Status: DISCONTINUED | OUTPATIENT
Start: 2022-03-18 | End: 2022-03-18

## 2022-03-18 RX ORDER — ROPIVACAINE/EPI/CLONIDINE/KET 2.46-0.005
SYRINGE (ML) INJECTION ONCE
Status: COMPLETED | OUTPATIENT
Start: 2022-03-18 | End: 2022-03-18

## 2022-03-18 RX ORDER — SODIUM CHLORIDE 0.9 % (FLUSH) 0.9 %
10 SYRINGE (ML) INJECTION
Status: DISCONTINUED | OUTPATIENT
Start: 2022-03-18 | End: 2022-03-18 | Stop reason: HOSPADM

## 2022-03-18 RX ORDER — DEXMEDETOMIDINE HYDROCHLORIDE 100 UG/ML
INJECTION, SOLUTION INTRAVENOUS
Status: DISCONTINUED | OUTPATIENT
Start: 2022-03-18 | End: 2022-03-18

## 2022-03-18 RX ORDER — SODIUM CHLORIDE 9 MG/ML
INJECTION, SOLUTION INTRAVENOUS
Status: DISCONTINUED | OUTPATIENT
Start: 2022-03-18 | End: 2022-03-18 | Stop reason: HOSPADM

## 2022-03-18 RX ORDER — TRANEXAMIC ACID 100 MG/ML
1000 INJECTION, SOLUTION INTRAVENOUS
Status: DISCONTINUED | OUTPATIENT
Start: 2022-03-18 | End: 2022-03-18 | Stop reason: HOSPADM

## 2022-03-18 RX ORDER — BISACODYL 10 MG
10 SUPPOSITORY, RECTAL RECTAL EVERY 12 HOURS PRN
Status: DISCONTINUED | OUTPATIENT
Start: 2022-03-18 | End: 2022-03-18 | Stop reason: HOSPADM

## 2022-03-18 RX ORDER — FAMOTIDINE 20 MG/1
20 TABLET, FILM COATED ORAL 2 TIMES DAILY
Status: DISCONTINUED | OUTPATIENT
Start: 2022-03-18 | End: 2022-03-18 | Stop reason: HOSPADM

## 2022-03-18 RX ORDER — ASPIRIN 81 MG/1
81 TABLET ORAL 2 TIMES DAILY
Status: DISCONTINUED | OUTPATIENT
Start: 2022-03-18 | End: 2022-03-18 | Stop reason: HOSPADM

## 2022-03-18 RX ORDER — ONDANSETRON 2 MG/ML
INJECTION INTRAMUSCULAR; INTRAVENOUS
Status: DISCONTINUED | OUTPATIENT
Start: 2022-03-18 | End: 2022-03-18

## 2022-03-18 RX ORDER — AMOXICILLIN 250 MG
1 CAPSULE ORAL 2 TIMES DAILY
Status: DISCONTINUED | OUTPATIENT
Start: 2022-03-18 | End: 2022-03-18 | Stop reason: HOSPADM

## 2022-03-18 RX ORDER — DEXAMETHASONE SODIUM PHOSPHATE 4 MG/ML
INJECTION, SOLUTION INTRA-ARTICULAR; INTRALESIONAL; INTRAMUSCULAR; INTRAVENOUS; SOFT TISSUE
Status: DISCONTINUED | OUTPATIENT
Start: 2022-03-18 | End: 2022-03-18

## 2022-03-18 RX ORDER — MIDAZOLAM HYDROCHLORIDE 1 MG/ML
.5-4 INJECTION INTRAMUSCULAR; INTRAVENOUS
Status: DISCONTINUED | OUTPATIENT
Start: 2022-03-18 | End: 2022-03-18 | Stop reason: HOSPADM

## 2022-03-18 RX ORDER — ROPIVACAINE HYDROCHLORIDE 2 MG/ML
INJECTION, SOLUTION EPIDURAL; INFILTRATION; PERINEURAL CONTINUOUS
Status: DISCONTINUED | OUTPATIENT
Start: 2022-03-18 | End: 2022-03-18 | Stop reason: HOSPADM

## 2022-03-18 RX ORDER — OXYCODONE HYDROCHLORIDE 5 MG/1
10 TABLET ORAL
Status: DISCONTINUED | OUTPATIENT
Start: 2022-03-18 | End: 2022-03-18 | Stop reason: HOSPADM

## 2022-03-18 RX ORDER — NALOXONE HCL 0.4 MG/ML
0.02 VIAL (ML) INJECTION
Status: DISCONTINUED | OUTPATIENT
Start: 2022-03-18 | End: 2022-03-18 | Stop reason: HOSPADM

## 2022-03-18 RX ORDER — PROCHLORPERAZINE EDISYLATE 5 MG/ML
5 INJECTION INTRAMUSCULAR; INTRAVENOUS EVERY 6 HOURS PRN
Status: DISCONTINUED | OUTPATIENT
Start: 2022-03-18 | End: 2022-03-18 | Stop reason: HOSPADM

## 2022-03-18 RX ORDER — ROPIVACAINE HYDROCHLORIDE 5 MG/ML
INJECTION, SOLUTION EPIDURAL; INFILTRATION; PERINEURAL
Status: COMPLETED | OUTPATIENT
Start: 2022-03-18 | End: 2022-03-18

## 2022-03-18 RX ORDER — METHOCARBAMOL 750 MG/1
750 TABLET, FILM COATED ORAL 3 TIMES DAILY
Status: DISCONTINUED | OUTPATIENT
Start: 2022-03-18 | End: 2022-03-18 | Stop reason: HOSPADM

## 2022-03-18 RX ORDER — PHENYLEPHRINE HCL IN 0.9% NACL 1 MG/10 ML
SYRINGE (ML) INTRAVENOUS
Status: DISCONTINUED | OUTPATIENT
Start: 2022-03-18 | End: 2022-03-18

## 2022-03-18 RX ORDER — SODIUM CHLORIDE 9 MG/ML
INJECTION, SOLUTION INTRAVENOUS CONTINUOUS
Status: DISCONTINUED | OUTPATIENT
Start: 2022-03-18 | End: 2022-03-18 | Stop reason: HOSPADM

## 2022-03-18 RX ORDER — LIDOCAINE HYDROCHLORIDE 20 MG/ML
INJECTION, SOLUTION EPIDURAL; INFILTRATION; INTRACAUDAL; PERINEURAL
Status: DISCONTINUED | OUTPATIENT
Start: 2022-03-18 | End: 2022-03-18

## 2022-03-18 RX ORDER — TRANEXAMIC ACID 100 MG/ML
INJECTION, SOLUTION INTRAVENOUS
Status: DISCONTINUED | OUTPATIENT
Start: 2022-03-18 | End: 2022-03-18

## 2022-03-18 RX ORDER — FENTANYL CITRATE 50 UG/ML
INJECTION, SOLUTION INTRAMUSCULAR; INTRAVENOUS
Status: DISCONTINUED | OUTPATIENT
Start: 2022-03-18 | End: 2022-03-18

## 2022-03-18 RX ORDER — FENTANYL CITRATE 50 UG/ML
25-200 INJECTION, SOLUTION INTRAMUSCULAR; INTRAVENOUS
Status: DISCONTINUED | OUTPATIENT
Start: 2022-03-18 | End: 2022-03-18 | Stop reason: HOSPADM

## 2022-03-18 RX ORDER — PROPOFOL 10 MG/ML
VIAL (ML) INTRAVENOUS
Status: DISCONTINUED | OUTPATIENT
Start: 2022-03-18 | End: 2022-03-18

## 2022-03-18 RX ORDER — HYDROMORPHONE HYDROCHLORIDE 1 MG/ML
0.2 INJECTION, SOLUTION INTRAMUSCULAR; INTRAVENOUS; SUBCUTANEOUS EVERY 5 MIN PRN
Status: DISCONTINUED | OUTPATIENT
Start: 2022-03-18 | End: 2022-03-18 | Stop reason: HOSPADM

## 2022-03-18 RX ORDER — POLYETHYLENE GLYCOL 3350 17 G/17G
17 POWDER, FOR SOLUTION ORAL DAILY
Status: DISCONTINUED | OUTPATIENT
Start: 2022-03-18 | End: 2022-03-18 | Stop reason: HOSPADM

## 2022-03-18 RX ADMIN — DEXMEDETOMIDINE HYDROCHLORIDE 12 MCG: 100 INJECTION, SOLUTION INTRAVENOUS at 07:03

## 2022-03-18 RX ADMIN — FAMOTIDINE 20 MG: 20 TABLET, FILM COATED ORAL at 10:03

## 2022-03-18 RX ADMIN — Medication 100 MCG: at 09:03

## 2022-03-18 RX ADMIN — METHOCARBAMOL 750 MG: 750 TABLET ORAL at 10:03

## 2022-03-18 RX ADMIN — ACETAMINOPHEN 1000 MG: 500 TABLET ORAL at 11:03

## 2022-03-18 RX ADMIN — CELECOXIB 400 MG: 200 CAPSULE ORAL at 06:03

## 2022-03-18 RX ADMIN — FENTANYL CITRATE 25 MCG: 50 INJECTION INTRAMUSCULAR; INTRAVENOUS at 07:03

## 2022-03-18 RX ADMIN — Medication 20 MG: at 07:03

## 2022-03-18 RX ADMIN — SODIUM CHLORIDE: 0.9 INJECTION, SOLUTION INTRAVENOUS at 07:03

## 2022-03-18 RX ADMIN — Medication 100 MCG: at 08:03

## 2022-03-18 RX ADMIN — HYDROMORPHONE HYDROCHLORIDE 0.2 MG: 1 INJECTION, SOLUTION INTRAMUSCULAR; INTRAVENOUS; SUBCUTANEOUS at 11:03

## 2022-03-18 RX ADMIN — PROPOFOL 50 MG: 10 INJECTION, EMULSION INTRAVENOUS at 07:03

## 2022-03-18 RX ADMIN — PROPOFOL 50 MCG/KG/MIN: 10 INJECTION, EMULSION INTRAVENOUS at 07:03

## 2022-03-18 RX ADMIN — MEPIVACAINE HYDROCHLORIDE 6 ML/HR: 15 INJECTION, SOLUTION EPIDURAL; INFILTRATION at 07:03

## 2022-03-18 RX ADMIN — SODIUM CHLORIDE: 0.9 INJECTION, SOLUTION INTRAVENOUS at 09:03

## 2022-03-18 RX ADMIN — Medication 150 MCG: at 09:03

## 2022-03-18 RX ADMIN — VANCOMYCIN HYDROCHLORIDE 1500 MG: 1 INJECTION, POWDER, LYOPHILIZED, FOR SOLUTION INTRAVENOUS at 07:03

## 2022-03-18 RX ADMIN — PREGABALIN 75 MG: 75 CAPSULE ORAL at 06:03

## 2022-03-18 RX ADMIN — Medication 200 MCG: at 08:03

## 2022-03-18 RX ADMIN — ACETAMINOPHEN 1000 MG: 500 TABLET ORAL at 06:03

## 2022-03-18 RX ADMIN — POLYETHYLENE GLYCOL 3350 17 G: 17 POWDER, FOR SOLUTION ORAL at 10:03

## 2022-03-18 RX ADMIN — SODIUM CHLORIDE, SODIUM GLUCONATE, SODIUM ACETATE, POTASSIUM CHLORIDE, MAGNESIUM CHLORIDE, SODIUM PHOSPHATE, DIBASIC, AND POTASSIUM PHOSPHATE: .53; .5; .37; .037; .03; .012; .00082 INJECTION, SOLUTION INTRAVENOUS at 08:03

## 2022-03-18 RX ADMIN — TRANEXAMIC ACID 1000 MG: 1 INJECTION, SOLUTION INTRAVENOUS at 07:03

## 2022-03-18 RX ADMIN — HYDROMORPHONE HYDROCHLORIDE 0.2 MG: 1 INJECTION, SOLUTION INTRAMUSCULAR; INTRAVENOUS; SUBCUTANEOUS at 12:03

## 2022-03-18 RX ADMIN — DEXAMETHASONE SODIUM PHOSPHATE 8 MG: 4 INJECTION INTRA-ARTICULAR; INTRALESIONAL; INTRAMUSCULAR; INTRAVENOUS; SOFT TISSUE at 07:03

## 2022-03-18 RX ADMIN — FENTANYL CITRATE 100 MCG: 50 INJECTION INTRAMUSCULAR; INTRAVENOUS at 06:03

## 2022-03-18 RX ADMIN — SENNOSIDES AND DOCUSATE SODIUM 1 TABLET: 50; 8.6 TABLET ORAL at 10:03

## 2022-03-18 RX ADMIN — VANCOMYCIN HYDROCHLORIDE 1500 MG: 1.5 INJECTION, POWDER, LYOPHILIZED, FOR SOLUTION INTRAVENOUS at 05:03

## 2022-03-18 RX ADMIN — Medication 150 MCG: at 08:03

## 2022-03-18 RX ADMIN — ONDANSETRON 4 MG: 2 INJECTION INTRAMUSCULAR; INTRAVENOUS at 08:03

## 2022-03-18 RX ADMIN — MEPIVACAINE HYDROCHLORIDE 3 ML: 15 INJECTION, SOLUTION EPIDURAL; INFILTRATION at 07:03

## 2022-03-18 RX ADMIN — ROPIVACAINE HYDROCHLORIDE 10 ML: 5 INJECTION, SOLUTION EPIDURAL; INFILTRATION; PERINEURAL at 06:03

## 2022-03-18 RX ADMIN — DEXMEDETOMIDINE HYDROCHLORIDE 4 MCG: 100 INJECTION, SOLUTION INTRAVENOUS at 09:03

## 2022-03-18 RX ADMIN — MUPIROCIN 1 G: 20 OINTMENT TOPICAL at 06:03

## 2022-03-18 RX ADMIN — OXYCODONE 10 MG: 5 TABLET ORAL at 10:03

## 2022-03-18 RX ADMIN — PROPOFOL 75 MCG/KG/MIN: 10 INJECTION, EMULSION INTRAVENOUS at 07:03

## 2022-03-18 RX ADMIN — MEPIVACAINE HYDROCHLORIDE 5 ML: 15 INJECTION, SOLUTION EPIDURAL; INFILTRATION at 07:03

## 2022-03-18 RX ADMIN — MIDAZOLAM 2 MG: 1 INJECTION INTRAMUSCULAR; INTRAVENOUS at 06:03

## 2022-03-18 RX ADMIN — Medication: at 08:03

## 2022-03-18 RX ADMIN — LIDOCAINE HYDROCHLORIDE 60 MG: 20 INJECTION, SOLUTION EPIDURAL; INFILTRATION; INTRACAUDAL at 07:03

## 2022-03-18 NOTE — PATIENT INSTRUCTIONS
[address], [place]                                                                                          [phone]                   Postoperative Instructions for Knee Surgery                 Your Surgery Included:   Open               Arthroscopic   [] Ligament Repair       [] Diagnostic           [] ACL     [] PCL     [] MCL     [] PLLC      [] Synovectomy / Plica Removal [] Meniscal Cartilage Repair / Transplantation      [] Lysis of Adhesions / Manipulation [] Articular Cartilage Repair      [] Interval Release           [] Cartimax       [] OATS   [] JOSIE      [] Meniscectomy           [] Osteochondral Allograft      [] Meniscal Cartilage Repair  [] Patellar Realignment       [] Debridement / Chondroplasty         [] Lateral Release   [] Ligament Repair      [] Articular Cartilage Repair          [] Extensor Mechanism             []   Microfracture  []  OATS         []  Cartilage Biopsy [] Tendon Repair          [] Ligament Reconstruction          [] Patella                  [] Quadriceps             []   ACL    []   PCL  [] High Tibial Osteotomy       [] Subchondroplasty  [x] Joint Replacement         [] Amniox Arthrocentesis           [] Unicompartmental   [] Patellofemoral                  Call our office ([phone]) immediately or message through [facility] if you experience any of the following:       Excessive bleeding or pus like drainage at the incision site       Uncontrollable pain not relieved by pain medication       Excessive swelling or redness at the incision site       Fever above 101.5 degrees not controlled with Tylenol or Motrin       Shortness of Breath or severe calf pain       Any foul odor or blistering from the surgery site    FOR EMERGENCIES: [facility] is the best way to contact us. If on the weekend, page the  at [phone] who will direct your call appropriately.    1.   Pain Management: A cold therapy cuff, pain medications,  local injections, TENs unit, and in some cases, regional anesthesia injections are used to manage your post-operative pain. The decision to use each of these options is based on their risks and benefits.     Medications: You were given one or more of the following medication prescriptions during your preoperative appointment. Follow the instructions on the bottles.     Narcotic Medication (usually Percocet, Norco, or Tramadol): Begin taking the medication before your knee starts to hurt. Some patients do not like to take any medication, but if you wait until your pain is severe before taking, you will be very uncomfortable for several hours waiting for the narcotic to work. Always take with food.     Nausea / Vomiting: For this issue, we prescribe Phenergan or Zofran, use this medication as directed as needed for nausea.     Cold Therapy: You may have been sent home with a qualifyor Care® cold therapy unit and wrap for your knee. Fill with ice and water to the indicated fill line. You can use 20-30 minutes on then off, several times a day. This will help relieve pain and control swelling. Do not sleep with on.     Regional Anesthesia Injections (Blocks): You may have been given a regional nerve block/catheter either before or after surgery. This may make your entire leg numb for 2-5 days.                           * Proceed with caution when bearing weight on your leg.     2.   Diet: Eat a bland diet for the first day after surgery. Progress your diet as tolerated. Constipation may occur with Narcotic usage. We recommend Colace 100mg twice a day while taking narcotics.    3.   Activity: Limit your activity during the first 48 hours, keep your leg elevated with pillows under your heel. After the first 48 hours at home, increase your activity level based on your symptoms.    4.   Dressing: (c) The soft, bulky dressing will be removed on the 3rd day after surgery. The Aquacel dressing (tan, long adhesive bandage) will  "remain on until the 1st post operative appointment. Do not remove. It is normal for some blood to be seen on the dressings. It is also normal for you to see apparent bruising on the skin around your incisions. If you are concerned by the drainage or the appearance of your wound site, you can send a picture via [facility].    5.   Shower: (c) You may shower on the 3rd day after surgery. The Aquacel bandage is to be covered with saran wrap before showering. Do not submerge limb in any water for 4 weeks or until incisions completely healed.  Do not get bandage wet.    6.   Your procedure did not require a post-operative brace.    7.   Your procedure did not require a Continuous Passive Motion (CPM) device.    8.   Weight Bearing: You may have been sent home with crutches. If instructed (see below), use these crutches at all times unless at complete rest.      [] Non-weight bearing for     0 weeks (you may touch your toes to the floor)      [] Partial weight bearing for  0 weeks    [] 25% Body Weight   [] 50% Body Weight      [x] Full weight bearing            [x]  NOW    []  after 0 weeks     9.  Knee Exercises: Begin these exercises the first day after surgery in order to help you regain your motion and strength. You may do the following marked exercises:     [x] Quad Sets - Begin activating your quadriceps muscle by driving your          knee downward into full knee extension while seated on a table or bed   with a towel rolled and propped under your heel     [x] Straight Leg Raise (SLR) - While kiley your quadriceps muscle, lift     your fully extended leg to the level of your non-operative knee (as shown)     [x] Heel Slides - With the knee straight, slide your heel slowly toward your   buttocks, hold at the endpoint for 10-15 seconds, then slowly straighten     [x] Ankle pumps - With your knee straight, move your ankle in a "pumping"    fashion to activate your calf and leg muscles      10.  Physical Therapy: " Physical therapy is an essential component to your recovery from surgery. Your physical therapy will start in 1 days.    FIRST POSTOPERATIVE VISIT: As scheduled.

## 2022-03-18 NOTE — ANESTHESIA PROCEDURE NOTES
L Adductor Canal Catheter    Patient location during procedure: pre-op   Block not for primary anesthetic.  Reason for block: at surgeon's request and post-op pain management   Post-op Pain Location: L Leg Pain   Start time: 3/18/2022 6:41 AM  Timeout: 3/18/2022 6:40 AM   End time: 3/18/2022 6:50 AM    Staffing  Authorizing Provider: Georgi Garcia MD  Performing Provider: Marisela Zavala MD    Preanesthetic Checklist  Completed: patient identified, IV checked, site marked, risks and benefits discussed, surgical consent, monitors and equipment checked, pre-op evaluation and timeout performed  Peripheral Block  Patient position: supine  Prep: ChloraPrep and site prepped and draped  Patient monitoring: heart rate, cardiac monitor, continuous pulse ox, continuous capnometry and frequent blood pressure checks  Block type: adductor canal  Laterality: left  Injection technique: continuous  Needle  Needle type: Tuohy   Needle gauge: 17 G  Needle length: 3.5 in  Needle localization: anatomical landmarks and ultrasound guidance  Catheter type: spring wound  Catheter size: 19 G  Test dose: lidocaine 1.5% with Epi 1-to-200,000 and negative   -ultrasound image captured on disc.  Assessment  Injection assessment: negative aspiration, negative parasthesia and local visualized surrounding nerve  Paresthesia pain: none  Heart rate change: no  Slow fractionated injection: yes  Pain Tolerance: comfortable throughout block and no complaints  Medications:    Medications: ropivacaine (NAROPIN) injection 0.5% - Perineural   10 mL - 3/18/2022 6:50:00 AM    Additional Notes  VSS.  [facility] RN monitoring vitals throughout procedure.  Patient tolerated procedure well.     20cc 0.25% Ropivacaine w 1:300 epi given.

## 2022-03-18 NOTE — PLAN OF CARE
Discharge instructions given to patient and spouse. Copies provided. Both verbalized understanding. RXs filled by hospital pharmacy and brought to bedside. No c/o nausea. Pain level tolerable. Surgical site C/D/I. Ice machine and walker sent home with patient. No s/s of distress noted. Pt taken via wc to vehicle.

## 2022-03-18 NOTE — DISCHARGE SUMMARY
[facility] - Surgery ([facility])  Orthopedics  Discharge Summary      Patient Name: Black Preciado  MRN: [id number]  Admission Date: 3/18/2022  Hospital Length of Stay: 0 days  Discharge Date and Time: No discharge date for patient encounter.  Attending Physician: Estela Trevino MD   Discharging Provider: Trev Delgado MD  Primary Care Provider: Manpreet Brewer MD    HPI: see hpi    Procedure(s) (LRB):  ARTHROPLASTY, KNEE (Left)      Hospital Course: Patient presented for above procedure.  Tolerated it well and was discharged home POD 1 after voiding, tolerating diet, ambulating, pain controlled.  Discharge instructions, follow-up appointment, and med rec are below.          Significant Diagnostic Studies: No pertinent studies.    Pending Diagnostic Studies:     Procedure Component Value Units Date/Time    Specimen to Pathology, Surgery Orthopedics [[id number]] Collected: 03/18/22 0843    Order Status: Sent Lab Status: In process Updated: 03/18/22 0923        There are no hospital problems to display for this patient.     Discharged Condition: stable    Disposition: Home or Self Care    Follow Up:    Patient Instructions:   No discharge procedures on file.  Medications:  Reconciled Home Medications:      Medication List      ASK your doctor about these medications    * aspirin 325 MG EC tablet  Commonly known as: ECOTRIN  Take 1 tablet (325 mg total) by mouth once daily.     * aspirin 81 MG EC tablet  Commonly known as: ECOTRIN  Take 81 mg by mouth once daily.     atorvastatin 40 MG tablet  Commonly known as: LIPITOR  Take 40 mg by mouth once daily.     azelastine 137 mcg (0.1 %) nasal spray  Commonly known as: ASTELIN  by Nasal route 2 (two) times daily as needed.     celecoxib 200 MG capsule  Commonly known as: CeleBREX  Take 1 capsule (200 mg total) by mouth 2 (two) times daily.     chlorthalidone 50 MG Tab  Commonly known as: HYGROTEN  Take 1 tablet by mouth once daily.     EPINEPHrine 0.3 mg/0.3 mL  Atin  Commonly known as: EPIPEN  use as directed; 2/22/22 states for cat allergy-never used     fish oil-omega-3 fatty acids 300-1,000 mg capsule  Take 1 capsule by mouth once daily.     fluticasone propionate 50 mcg/actuation nasal spray  Commonly known as: FLONASE  by Each Nostril route once daily.     HYDROcodone-acetaminophen 7.5-325 mg per tablet  Commonly known as: NORCO  Take 1 tablet by mouth every 6 (six) hours as needed for Pain.     methocarbamoL 500 MG Tab  Commonly known as: ROBAXIN  Take 1 tablet (500 mg total) by mouth 3 (three) times daily. for 10 days     multivitamin per tablet  Commonly known as: THERAGRAN  Take 1 tablet by mouth once daily.     oxyCODONE-acetaminophen  mg per tablet  Commonly known as: PERCOCET  Take 1 tablet by mouth every 6 (six) hours as needed for Pain.     promethazine 25 MG tablet  Commonly known as: PHENERGAN  Take 1 tablet (25 mg total) by mouth every 6 (six) hours as needed for Nausea.     sulfamethoxazole-trimethoprim 800-160mg 800-160 mg Tab  Commonly known as: BACTRIM DS  Take 1 tablet by mouth every 12 (twelve) hours. X 14 days -started prior to sinus surgery on 2/21/22         * This list has 2 medication(s) that are the same as other medications prescribed for you. Read the directions carefully, and ask your doctor or other care provider to review them with you.                Trev Delgado MD  Orthopedics  [facility] - Surgery ([facility])

## 2022-03-18 NOTE — OP NOTE
[facility] - Surgery (2nd Fl)  Operative Note      Date of Procedure: 3/18/2022     Procedure: Procedure(s) (LRB):  ARTHROPLASTY, KNEE (Left)     Surgeon(s) and Role:     * Estela Trevino MD - Primary     * Trev Delgado MD - Resident - Assisting        Pre-Operative Diagnosis: Primary osteoarthritis of left knee [M17.12]  Genu varum of both lower extremities [M21.161, M21.162]    Post-Operative Diagnosis: Post-Op Diagnosis Codes:     * Primary osteoarthritis of left knee [M17.12]     * Genu varum of both lower extremities [M21.161, M21.162]    Anesthesia: General    Operative Findings (including complications, if any): Tricompartmental arthritis; large loose body    Description of Technical Procedures:   DATE OF PROCEDURE:  3/18/2022    ATTENDING SURGEON: Surgeon(s) and Role:     * Estela Trevino MD - Primary     * Trev Delgado MD - Resident - Assisting     FIRST ASSISTANT:Trev Delgado MD- Resident  SECOND ASSISTANT: Carmina Zeng PA-C    PREOPERATIVE DIAGNOSIS: Left Arthritis, Traumatic M12.50, Genu Varum (acquired) M21.169 and Loose Body    POSTOPERATIVE DIAGNOSIS:  Left Arthritis, Traumatic M12.50, Genu Varum (acquired) M21.169 and , Large Loose Body    PROCEDURES PERFORMED:  Left Replacement, Knee, Medial and Lateral compartment (Total Knee) 82200      ANESTHESIA:  Spinal/Epidural, Local 50 cc SUMAN, Adductor block with catheter    FLUIDS IN THE CASE: 2000 mL     TOURNIQUET TIME: 14 minutes.    COMPLICATIONS: NONE    URINE OUTPUT: 0 mL    ESTIMATED BLOOD LOSS: 200 mL    CONDITION:  Good      INDICATIONS FOR OPERATIVE PROCEDURES:  Black Preciado is a 61 y.o. male with history of left knee pain and pathology. He noted significant problems in the area of concern with problems on activities of daily living and aggressive use of the right leg. As a result of these problems and problems with overall activity level, the patient was deemed to be an appropriate candidate for operative intervention.  There was significant genu varum and superomedial loose body was noted and based on the patient's age and functional level, the patient was deemed to be an appropriate candidate for use of [employer] Implant products.    IMPLANTS UTILIZED:   ConforMIS tibial femoral implant  ConforMIS total tibial tray  ConforMIS 6 mm medial insert  ConforMIS lateral A insert  ConforMIS 41 mm x 10 mm patellar component  [place] Gentamicin bone Cement    FINDINGS:     ARTICULAR CARTILAGE LESION(S):  Medial Femoral Condyle: ICRS Grade 4A      Size: 3 x 6 cm  Medial tibial plateau: ICRS Grade 4A      Size: 4.0 x 4.0 cm        Lateral Femoral Condyle: ICRS Grade 4A      Size: 3.5 x 3.5 cm  Lateral tibial plateau: ICRS Grade 4A      Size: 3.5 x 3.5 cm        Patellar surface: ICRS Grade 4A      Size: 3.5 x 3.5 cm  Trochlear groove: ICRS Grade 4A      Size: 4.0 x 4.0 cm    EXAMINATION UNDER ANESTHESIA:   Extension 10 degrees  Flexion 90 degrees  Lachman Maneuver:  Negative  Anterior Drawer: Negative  Posterior Drawer:  Negative    DESCRIPTION OF PROCEDURE:   The patient was brought into the Operating Room and placed in supine position. Upon application of femoral block in the preoperative holding area, the patient underwentGeneral to stabilize the area. The patient was given the appropriate dose of antibiotics based on body weight. Timeout was utilized to verify the RIGHT LEFT BILATERAL:[id number]} side as the operative side. Both upper extremities were placed in comfortable position. The nonoperative leg was carefully padded along the heel and peroneal nerve regions. Hart catheter was applied. Operative leg was then prepped and draped in a sterile fashion with ChloraPrep material with a bump under the right hip and popliteal post along the right side of the table. Once prepped and draped in sterile fashion, Coban was placed on the knee. A medial based incision was carried down the skin down to fat and fascia. A medial subvastus approach was  performed and the patella was subluxed lateral  Flaps were raised superiorly and inferiorly as well as medially and laterally along the region of concern.      Attention was turned to the distal femur and the # 1 preparation device from ConforMIS was used to create the distal lug holes for the implant and # 2 guide. We then drilled the distal femoral region as medially and laterally along the region of concern. We placed a # 2 cutting block, which was applied made the distal pin holes, which were then removed simultaneously and drilled proximal pin holes and pins to stabilize the distal femoral cutting block. This was left in place and the saw blade used to create the distal femoral cut. Bone was removed. We then assessed our cut with a #3 guide from the ConforMIS set. The cut was found to be excellent. As a result, we turned our attention to the proximal tibia.    ACL structure was resected. The PCL structure was recessed. Medial and lateral meniscus remnants were removed with the Bovie cautery. We then applied the # 4 cutting system directly over the area of concern. We used to currettes to remove the cartilage from the proximal tibia down to the subchondral bone level. Extramedullary alignment alec was used to verify appropriate alignment. The cutting guide was pinned into position and an oscillating saw used, we made the proximal cut. We used the # 3 guide to assess our extension gap and the # 6 guide to assess our flexion gap. There gaps were symmetric flexion and extension gaps with appropriate alingment as well.    With this performed, we then visualized the distal femus once again and placed the # 7 femoral cutting guide into position and pinned this with the appropriate distal femoral rotation. The previously placed pin holes along the distal femur were used to position this cutting guide and the oblique pin holes created and the pins placed to hold the guide in the appropriated rotation. The central pins  were removed. Anterior and posterior condylar and chamfer cuts were created as well as the distal lug holes for the femoral component. The # 8/9 femoral guide was applied and the final posterior chamfer cuts were created. We placed the trial femoral and tibial components demonstrating great stability in flexion and extension with varus and valgus load as well as great flexion without shift of the tibial component.    We re-exposed the proximal tibia, placed the preparation tray for the tibial Region and pinned this into position. The central tower was applied and the proximal tibia drilled centrally followed by application of the trial keel. We then removed the trial keel.We exposed the patella, sized the patella, demonstrating the 41 mm patellar component would most normalize the patient's anatomy. As a result, we removed approximately 10 mm of bone, drilled 3 peg holes and placed the trial patella fit in excellent fashion. The knee was taken through range of motion demonstrating excellent tracking and stability. As a result, trial components were left in place. An Esmarch was then used to exsanguinate the limb. Tourniquet was inflated. Trial components were removed. We exposed the femoral, proximal tibial plateau and patellar regions. Pulsavac was used to remove all blood elements and the areas dried and prepared for cementing. We then mixed cement and placed cement first at the tibial region and placed the tibial component position and extruded cement was removed. We then turned our attention to the femoral and patellar regions. These dried areas were then cemented with extruded cement removed from the femoral component as well as applied cement to the patellar component. The we then performed trial reduction with the trial inserts applied. Knee was taken to extension demonstrating excellent stability with no signs of hyperextension remaining. The tourniquet was released and all bleeding sites were cauterized. We  placed a one to one mixture of iodine and saline into the knee and allowed this to sit for 3-5 minutes. Final pulsavac lavage was performed with application of 3 liters of fluid through the knee. Trial components were removed and the actual 6 mm medial insert was applied along with the size A lateral insert. Prior to placement of these inserts SUMAN mixture was applied to the posterior capsular, medial subvastus region and anterior fatpad regions of the knee with a 20 gauge spinal needle. Further irrigation performed along the area of concern. Following placement of the drain, we then closed the synovial layer and parapatellar tendon region with a series of #1 Vicryl sutures placed in figure-of-eight fashion. We then closed subcutaneous tissues with 3-0 Vicryl sutures placed in inverted fashion. Skin was closed with running 3-0 Monocryl sutures placed in subcuticular fashion along with application of Dermabond ointment and an aquacel bandage. We did apply intraarticular Exparel for postoperative pain control. We applied Xeroform gauze, ABD pads, cast padding, sterile electrode pads proximally and distally, a long-leg CRYSTAL hose stocking and cooling unit. The patient was allowed to recover from the anesthetic. was removed. The patient was taken to Recovery Room in stable condition. At the completion of the case, all instrument and sponge counts were correct.     NOTE: I was present and scrubbed for the key portions of the procedure.    PHYSICAL THERAPY:  The patient should begin physical therapy on postoperative   day #3 and will be advanced to outpatient therapy as soon as   Possible following discharge.    Weight bearing:as tolerated  left leg  Range of Motion:Full normal motion symmetric to opposite side    begin active assisted range of motion programs.    Immediate specific exercises should include:extension exercises, quadriceps load with a heel roll, prone hang procedures with 5-10 lbs. ankle weights.    Gait  program should include: active extension with a heel-to-toe gait working on maintaining extension    Significant Surgical Tasks Conducted by the Assistant(s), if Applicable: preparation and closure    Estimated Blood Loss (EBL): 200 mL           Implants:   Implant Name Type Inv. Item Serial No.  Lot No. LRB No. Used Action   CEMENT BONE SMPLX HV GENTMYCN - [id number]  CEMENT BONE SMPLX HV GENTMYCN  [employer]. [id number] Left 2 Implanted   I TOTAL IDENTITY  CR FEMORAL INMPLANT ( COCR CEMENTED) , LEFT   [id number] CONFORMIS  Left 1 Implanted   I TOTAL IDENTITY CR TIBIAL TRAY (TITANIUM, CEMENTED) LEFT   [id number] CONFORMIS  Left 1 Implanted   ITOTAL IDENTITY  CR TIBIAL INSERT, IPOLY XE MEDIAL, 6MM, LEFT   [id number] CONFORMIS  Left 1 Implanted   ITOTAL  IDENTITY CR TIBIAL INSERT, IPOLYXE, LATERAL, A , LEFT   [id number] CONFORMIS  Left 1 Implanted   ITOTAL-IPOLY XE-41MM X 10MM PATELLA IMPLANT    CONFORMIS [id number] Left 1 Implanted       Specimens:   Specimen (24h ago, onward)             Start     Ordered    03/18/22 0922  Specimen to Pathology, Surgery Orthopedics  Once        Comments: Pre-op Diagnosis: Primary osteoarthritis of left knee [M17.12]  Genu varum of both lower extremities [M21.161, M21.162]    Procedure(s):  ARTHROPLASTY, KNEE     Number of specimens: 1    Name of specimens: 1. Loose body left knee- permanent   References:    Click here for ordering Quick Tip   Question Answer Comment   Procedure Type: Orthopedics    Specimen Class: Routine/Screening    Release to patient Immediate        03/18/22 0923                        Condition: Good    Disposition: PACU - hemodynamically stable.    Attestation: I was present and scrubbed for the key portions of the procedure.    Discharge Note    OUTCOME: Patient tolerated treatment/procedure well without complication and is now ready for discharge.    DISPOSITION: Home or Self Care    FINAL DIAGNOSIS:  <principal problem not specified>    FOLLOWUP: In  clinic    DISCHARGE INSTRUCTIONS:  No discharge procedures on file.

## 2022-03-18 NOTE — ANESTHESIA PREPROCEDURE EVALUATION
[facility]  Anesthesia Pre-Operative Evaluation         Patient Name: Black Preciado  YOB: 1960  MRN: [id number]    SUBJECTIVE:     Pre-operative evaluation for Procedure(s) (LRB):  ARTHROPLASTY, KNEE (Left)     03/18/2022    Black Preciado is a 61 y.o. male w/ a significant PMHx of p5zcbfgo, HTN, AUREA, non-sustained VT, syncope, arthritis, and TIA.    Patient now presents for the above procedure(s).      LDA: None documented.     Prev airway: None documented.    Drips: None documented.      Patient Active Problem List   Diagnosis    Vasovagal syncope    Bradycardia    Primary osteoarthritis of left knee    Tobacco dependence    Morbid obesity with BMI of 40.0-44.9, adult    Pure hypercholesterolemia    Screening for colorectal cancer    Other specified cardiac arrhythmias    Status post placement of implantable loop recorder    Syncope and collapse    Acquired genu varum, left    Chronic pain of both knees    Impairment of balance    Difficulty navigating stairs    Gait abnormality    NSVT (nonsustained ventricular tachycardia)    Essential hypertension    Mixed hyperlipidemia    AUREA (obstructive sleep apnea)    Lower urinary tract symptoms (LUTS)    Personal history of TIA (transient ischemic attack)    Prediabetes       Review of patient's allergies indicates:   Allergen Reactions    Penicillins Other (See Comments)     Unknown reaction as a baby       Current Inpatient Medications:   ROPIvacaine-epi-clonid-ketorol (SUMAN)   Intra-articular Once       Current Facility-Administered Medications on File Prior to Encounter   Medication Dose Route Frequency Provider Last Rate Last Admin    0.9%  NaCl infusion   Intravenous Continuous Aga Valencia NP        vancomycin in dextrose 5 % 1 gram/250 mL IVPB 1,000 mg  1,000 mg Intravenous On Call Procedure Aga Valencia NP   1,000 mg at 03/02/20 1509     Current Outpatient Medications on File  Prior to Encounter   Medication Sig Dispense Refill    fish oil-omega-3 fatty acids 300-1,000 mg capsule Take 1 capsule by mouth once daily.      HYDROcodone-acetaminophen (NORCO) 7.5-325 mg per tablet Take 1 tablet by mouth every 6 (six) hours as needed for Pain.      multivitamin (THERAGRAN) per tablet Take 1 tablet by mouth once daily.      sulfamethoxazole-trimethoprim 800-160mg (BACTRIM DS) 800-160 mg Tab Take 1 tablet by mouth every 12 (twelve) hours. X 14 days -started prior to sinus surgery on 22         Past Surgical History:   Procedure Laterality Date    INSERTION OF PACEMAKER  2020    knee scope      REMOVAL OF IMPLANTABLE LOOP RECORDER N/A 3/2/2020    Procedure: REMOVAL, IMPLANTABLE LOOP RECORDER;  Surgeon: Cy Villeda MD;  Location: [facility] EP LAB;  Service: Cardiology;  Laterality: N/A;  EOC, ILR Removal, RN Sedation, DM, 3 Prep    SINUS SURGERY  2022       Social History     Socioeconomic History    Marital status:    Tobacco Use    Smoking status: Former Smoker     Packs/day: 0.50     Types: Cigarettes     Quit date: 10/1/2021     Years since quittin.4    Smokeless tobacco: Never Used   Substance and Sexual Activity    Alcohol use: Yes     Alcohol/week: 6.0 standard drinks     Types: 6 Cans of beer per week     Comment: socially    Drug use: Yes     Types: Marijuana     Comment: last used on 3/17/22     Social Determinants of Health     Financial Resource Strain: Low Risk     Difficulty of Paying Living Expenses: Not hard at all   Food Insecurity: No Food Insecurity    Worried About Running Out of Food in the Last Year: Never true    Ran Out of Food in the Last Year: Never true   Transportation Needs: No Transportation Needs    Lack of Transportation (Medical): No    Lack of Transportation (Non-Medical): No   Physical Activity: Sufficiently Active    Days of Exercise per Week: 3 days    Minutes of Exercise per Session: 60 min   Stress: No Stress  Concern Present    Feeling of Stress : Only a little   Social Connections: Unknown    Frequency of Communication with Friends and Family: More than three times a week    Frequency of Social Gatherings with Friends and Family: Twice a week    Active Member of Clubs or Organizations: No    Attends Club or Organization Meetings: Patient refused    Marital Status:    Housing Stability: Low Risk     Unable to Pay for Housing in the Last Year: No    Number of Places Lived in the Last Year: 1    Unstable Housing in the Last Year: No       OBJECTIVE:     Vital Signs Range (Last 24H):  Temp:  [37 °C (98.6 °F)]   Pulse:  [60]   Resp:  [20]   BP: (127)/(69)   SpO2:  [96 %]       Significant Labs:  Lab Results   Component Value Date    WBC 6.99 03/10/2022    WBC 6.99 03/10/2022    HGB 14.6 03/10/2022    HGB 14.6 03/10/2022    HCT 44.8 03/10/2022    HCT 44.8 03/10/2022     03/10/2022     03/10/2022    CHOL 145 02/15/2021    TRIG 90 02/15/2021    HDL 48 02/15/2021    ALT 62 (H) 03/10/2022    AST 39 03/10/2022     03/10/2022     03/10/2022    K 3.5 03/10/2022    K 3.5 03/10/2022    CL 98 03/10/2022    CL 98 03/10/2022    CREATININE 0.9 03/10/2022    CREATININE 0.9 03/10/2022    BUN 10 03/10/2022    BUN 10 03/10/2022    CO2 28 03/10/2022    CO2 28 03/10/2022    TSH 1.491 03/10/2022    PSA 0.34 12/14/2018    INR 1.0 03/10/2022    HGBA1C 5.7 (H) 02/15/2021       Diagnostic Studies: No relevant studies.    EKG:   Results for orders placed or performed during the hospital encounter of 03/02/20   EKG 12-lead    Collection Time: 03/02/20 12:59 PM    Narrative    Test Reason : Z01.812,    Vent. Rate : 063 BPM     Atrial Rate : 063 BPM     P-R Int : 138 ms          QRS Dur : 094 ms      QT Int : 414 ms       P-R-T Axes : 013 -11 021 degrees     QTc Int : 423 ms    Normal sinus rhythm  Normal ECG  When compared with ECG of 07-FEB-2020 10:01,  No significant change was found  Confirmed by Leann GOTTI,  Spencer ([id number]) on 3/2/2020 5:39:02 PM    Referred By: DARRELL BALLESTEROS           Confirmed By:Spencer Noriega MD       2D ECHO:  TTE:  Results for orders placed or performed during the hospital encounter of 02/07/20   Echo   Result Value Ref Range    Ascending aorta 2.67 cm    STJ 2.63 cm    AV mean gradient 3 mmHg    Ao peak neptali 1.17 m/s    Ao VTI 22.20 cm    IVS 1.07 0.6 - 1.1 cm    LA size 4.27 cm    Left Atrium Major Axis 5.67 cm    Left Atrium Minor Axis 5.65 cm    LVIDd 4.60 3.5 - 6.0 cm    LVIDs 3.40 2.1 - 4.0 cm    LVOT diameter 2.40 cm    LVOT peak VTI 22.26 cm    Posterior Wall 0.96 0.6 - 1.1 cm    MV Peak A Neptali 0.63 m/s    E wave deceleration time 263.65 msec    MV Peak E Neptali 0.60 m/s    PV Peak D Neptali 0.45 m/s    PV Peak S Neptali 0.47 m/s    RA Major Axis 4.50 cm    RA Width 3.78 cm    RVDD 4.28 cm    Sinus 3.87 cm    TAPSE 1.82 cm    TR Max Neptali 2.18 m/s    TDI LATERAL 0.11 m/s    TDI SEPTAL 0.08 m/s    LA WIDTH 3.82 cm    LV Diastolic Volume 83.64 mL    LV Systolic Volume 32.24 mL    LVOT peak neptali 1.14 m/s    LV LATERAL E/E' RATIO 5.45 m/s    LV SEPTAL E/E' RATIO 7.50 m/s    FS 26 %    LA volume 78.47 cm3    LV mass 162.09 g    Left Ventricle Relative Wall Thickness 0.42 cm    AV valve area 4.53 cm2    AV Velocity Ratio 0.97     AV index (prosthetic) 1.00     E/A ratio 0.95     Mean e' 0.10 m/s    Pulm vein S/D ratio 1.04     LVOT area 4.5 cm2    LVOT stroke volume 100.65 cm3    AV peak gradient 5 mmHg    E/E' ratio 6.32 m/s    Triscuspid Valve Regurgitation Peak Gradient 19 mmHg    BSA 2.53 m2    LV Systolic Volume Index 13.3 mL/m2    LV Diastolic Volume Index 34.58 mL/m2    LA Volume Index 32.4 mL/m2    LV Mass Index 67 g/m2    Right Atrial Pressure (from IVC) 3 mmHg    TV rest pulmonary artery pressure 22 mmHg    Narrative    · Normal left ventricular systolic function. The estimated ejection   fraction is 65%.  · Normal LV diastolic function.  · No wall motion abnormalities.  · Normal right ventricular systolic  function.  · The estimated PA systolic pressure is 22 mmHg.  · Normal central venous pressure (3 mmHg).          NIGHAT:  No results found for this or any previous visit.    ASSESSMENT/PLAN:                                                                                                                 03/18/2022  Black Preciado is a 61 y.o., male.      Pre-op Assessment    I have reviewed the Patient Summary Reports.    I have reviewed the NPO Status.   I have reviewed the Medications.     Review of Systems  Anesthesia Hx:  No problems with previous Anesthesia  Denies Family Hx of Anesthesia complications.   Denies Personal Hx of Anesthesia complications.   Social:  Smoker, Alcohol Use    Cardiovascular:   Hypertension Dysrhythmias (non-sustained VT)    Pulmonary:   Sleep Apnea    Musculoskeletal:   Arthritis     Psych:   Psychiatric History Anxiety  History of psychiatric care  Psychiatric problem  Major depression, single episode         Physical Exam  General: Well nourished    Airway:  Mallampati: III   Mouth Opening: Normal  TM Distance: Normal  Tongue: Large  Neck ROM: Normal ROM    Dental:  Intact        Anesthesia Plan  Type of Anesthesia, risks & benefits discussed:    Anesthesia Type: Gen ETT, Regional  Intra-op Monitoring Plan: Standard ASA Monitors  Post Op Pain Control Plan: multimodal analgesia and IV/PO Opioids PRN  Induction:  IV  Airway Plan: Direct, Post-Induction  Informed Consent: Informed consent signed with the Patient and all parties understand the risks and agree with anesthesia plan.  All questions answered.   ASA Score: 3  Day of Surgery Review of History & Physical: H&P Update referred to the surgeon/provider.    Ready For Surgery From Anesthesia Perspective.     .

## 2022-03-18 NOTE — PROGRESS NOTES
Received call from preop. Pt going for knee surgery and has a MDT pacemaker, noted on his id card.  Pt never seen here at [facility].    No reprogramming needs to be done as the surgery is below his waist, as long as it is certain pt has a PPM and not an ICD.

## 2022-03-18 NOTE — PT/OT/SLP EVAL
"Occupational Therapy   Evaluation and Discharge Note    Name: Black Preciado  MRN: [id number]  Admitting Diagnosis:  <principal problem not specified>   Recent Surgery: Procedure(s) (LRB):  ARTHROPLASTY, KNEE (Left) Day of Surgery    Recommendations:     Discharge Recommendations: outpatient PT  Discharge Equipment Recommendations:  none  Barriers to discharge:  None    Assessment:     Black Preciado is a 61 y.o. male with a medical diagnosis of <principal problem not specified>. At this time, patient is functioning at their prior level of function and does not require further acute OT services.    OT Acute eval complete. Goals established. Pt required SBA in bed mobility and Min A to william/doff gown. Pt able to complete toileting in standing with SBA for standing balance. Pt ambulated ~60ft SBA using RW with v/c for RW management. Pt does not required Acute OT services and  is safe to d/c home with OP PT when medically appropriate.     Plan:     During this hospitalization, patient does not require further acute OT services.  Please re-consult if situation changes.    · Plan of Care Reviewed with: patient    Subjective     Chief Complaint: "I really have to use the bathroom"  Patient/Family Comments/goals: to go home    Occupational Profile:  Living Environment: Pt lives with spouse in [facility] with 1STE  Previous level of function: Mod I  Equipment Used at home:  cane, straight, walker, rolling  Assistance upon Discharge: Pt will have assistance from spouse upon d/c.    Pain/Comfort:  · Pain Rating 1: 7/10  · Location - Side 1: Left  · Location 1: knee  · Pain Addressed 1: Pre-medicate for activity, Reposition, Distraction    Patients cultural, spiritual, Temple conflicts given the current situation: no    Objective:     Communicated with: RN prior to session.  Patient found supine with blood pressure cuff, FCD, perineural catheter, peripheral IV, telemetry upon OT entry to room.    General Precautions: " Standard, fall   Orthopedic Precautions:LLE weight bearing as tolerated   Braces: N/A  Respiratory Status: Room air     Occupational Performance:    Bed Mobility:    · Patient completed Scooting/Bridging with stand by assistance  · Patient completed Supine to Sit with contact guard assistance  · Patient completed Sit to Supine with contact guard assistance    Functional Mobility/Transfers:  · Patient completed Sit <> Stand Transfer with stand by assistance  with  rolling walker   · Functional Mobility:  Pt required SBA in bed mobility and Min A to william/doff gown. Pt able to complete toileting in standing with SBA for standing balance. Pt ambulated ~60ft SBA using RW with v/c for RW management. Pt does not required Acute OT services and  is safe to d/c home with OP PT when medically appropriate.    Activities of Daily Living:  · Upper Body Dressing: minimum assistance william/doff gown  · Lower Body Dressing: total assistance william socks  · Toileting: stand by assistance using urinal in standing and sitting    Cognitive/Visual Perceptual:  Cognitive/Psychosocial Skills:     -       Oriented to: Person, Place, Time and Situation   -       Safety awareness/insight to disability: intact     Physical Exam:  Upper Extremity Range of Motion:     -       Right Upper Extremity: WFL  -       Left Upper Extremity: WFL  Upper Extremity Strength:    -       Right Upper Extremity: WFL  -       Left Upper Extremity: WFL   Strength:    -       Right Upper Extremity: WFL  -       Left Upper Extremity: WFL    AMPAC 6 Click ADL:  AMPAC Total Score: 22    Treatment & Education:  - OT/POC-  - Importance of mobility to maximize recovery.  - safety w/ functional mobility; hand placement to ensure safe transfers to various surfaces in prep for ADLs  - Reviewed gait sequence and RW management via verbalization and demonstration   - encouraged to ambulate within household environment at least every hour to hour 1/2  -Education provided on  cryotherapy protocols  Education:    Patient left supine with all lines intact, call button in reach and RN notified    GOALS:   Multidisciplinary Problems     Occupational Therapy Goals     Not on file                History:     Past Medical History:   Diagnosis Date    Hyperlipidemia     Hypertension     Major depression, single episode 9/13/2013    Morbid obesity with BMI of 40.0-44.9, adult 7/18/2016    AUREA (obstructive sleep apnea)     Primary osteoarthritis of left knee 7/18/2016    Screening for colorectal cancer 12/13/2018    Normal 2014 -10 yrs    Sinus tanja-tachy syndrome 7/15/2015    Syncope and collapse     vasovagal -sees EP Cards       Past Surgical History:   Procedure Laterality Date    INSERTION OF PACEMAKER  11/03/2020    knee scope      REMOVAL OF IMPLANTABLE LOOP RECORDER N/A 3/2/2020    Procedure: REMOVAL, IMPLANTABLE LOOP RECORDER;  Surgeon: Cy Villeda MD;  Location: [facility] EP LAB;  Service: Cardiology;  Laterality: N/A;  EOC, ILR Removal, RN Sedation, DM, 3 Prep    SINUS SURGERY  02/21/2022       Time Tracking:     OT Date of Treatment: 03/18/22  OT Start Time: 1320  OT Stop Time: 1350  OT Total Time (min): 30 min    Billable Minutes:Evaluation 10  Self Care/Home Management 20    3/18/2022

## 2022-03-18 NOTE — ANESTHESIA PROCEDURE NOTES
CSE    Patient location during procedure: OR  Start time: 3/18/2022 7:10 AM  Timeout: 3/18/2022 7:05 AM  End time: 3/18/2022 7:25 AM      Staffing  Authorizing Provider: Kath Tamez MD  Performing Provider: Comfort Renee MD    Preanesthetic Checklist  Completed: patient identified, IV checked, site marked, risks and benefits discussed, surgical consent, monitors and equipment checked, pre-op evaluation and timeout performed  CSE  Patient position: sitting  Prep: ChloraPrep  Patient monitoring: heart rate, continuous pulse ox and frequent blood pressure checks  Approach: midline  Spinal Needle  Needle type: pencil-tip   Needle gauge: 25 G  Needle length: 5 in  Epidural Needle  Injection technique: STEF air  Needle type: Tuohy   Needle gauge: 17 G  Needle length: 3.5 in  Needle insertion depth: 6 cm  Location: L3-4  Needle localization: anatomical landmarks   Catheter  Catheter type: Wikia  Catheter size: 19 G  Catheter at skin depth: 10 cm  Test dose: lidocaine 1.5% with Epi 1-to-200,000  Test dose: 3 mL  Additional Documentation: incremental injection, negative aspiration for CSF, negative aspiration for heme and negative test dose  Assessment  Intrathecal Medications:   administered: primary anesthetic mcg of    Medications:    Medications: Intrathecal - mepivacaine 15 mg/mL (1.5 %) injection - Intrathecal   3 mL - 3/18/2022 7:23:00 AM

## 2022-03-18 NOTE — PT/OT/SLP EVAL
"Physical Therapy Evaluation and Discharge Note     Patient Name:  Black Preciado  MRN: [id number]    Admit Date: 3/18/2022  Admitting Diagnosis:  <principal problem not specified>  Length of Stay: 0 days  Recent Surgery: Procedure(s) (LRB):  ARTHROPLASTY, KNEE (Left) Day of Surgery    Recommendations:     Discharge Recommendations: Outpatient PT   Equipment recommendations: none - has RW  Barriers to discharge: None Identified     Assessment:     Black Preciado is a 61 y.o. male admitted to [facility] on 3/18/2022 with medical diagnosis of s/p L knee arthroplasty. Pt presents with decreased functional mobility, pain, weakness, and WBAT LLE orthopedic precautions. These deficits effect their roles and responsibilities in which they were able to complete prior to admit. Pt was able to perform bed mobility SBA. Ambulated 60 ft SBA with RW. Pt trialed safely stepping up/down 6" step with RW. Pt demonstrates safety with ambulation. Black Preciado does not require acute care PT at this time. Once medically stable, recommending pt discharge to Outpatient PT .      Rehab Prognosis: Good    Plan:     Pt does not require acute care PT during this admission.    Plan of Care Expires:  04/17/22  Plan of Care reviewed with: patient    This plan of care has been discussed with the patient/caregiver, who was included in its development and is in agreement with the identified goals and treatment plan.     Subjective     Communicated with RN prior to session.  Patient found HOB elevated upon PT entry to room, agreeable to evaluation. Pt alone during session.    Chief Complaint: No chief complaint on file.      Patient/Family Comments/goals: "I really need to go to the bathroom"    Pain/Comfort:  · Pain Rating 1: 7/10  · Location - Side 1: Left  · Location 1: knee  · Pain Addressed 1: Reposition, Distraction, Cessation of Activity  · Pain Rating Post-Intervention 1: 7/10    Patients cultural, spiritual, Jainism conflicts given the " current situation: None identified     Patient History: information obtained from pt    Living Environment: Pt lives with wife in single level home  with 1 MATA. Bathroom set-up: tub/shower combo  Prior Level of Function: independent with mobility and ADLs  DME owned: rolling walker  Support Available/Caregiver Assistance: family    Objective:     Patient found with: cryotherapy, telemetry    Recent Surgery: Procedure(s) (LRB):  ARTHROPLASTY, KNEE (Left) Day of Surgery  General Precautions: Standard, fall   Orthopedic Precautions:LLE weight bearing as tolerated   Braces:     Oxygen Device: room air      Exams:     Cognition:  · Alert  · Command following: Follows multistep verbal commands  · Communication: clear/fluent     Sensation:   o Light touch sensation: Intact BLEs     Edema/Skin Integrity: None noted; Visible skin intact     Lower Extremity Range of Motion:  o Right Lower Extremity: WFL  o Left Lower Extremity: WFL     Lower Extremity Strength:  o Right Lower Extremity: WFL  o Left Lower Extremity: WFL    Functional Mobility:    Bed Mobility:  · Supine > Sit with stand by assistance  · Sit > Supine with stand by assistance    Transfers:   · Sit <> Stand Transfer: Stand-by Assistance x 1 trials from EOB with RW AD              Gait:  · Distance: 60  · Assistance level: Stand-by Assistance  · Assistive Device: rolling walker  · Gait Assessment: decreased step length  and antalgic gait pattern    Balance:  · Dynamic Sitting: GOOD: Maintains balance through MODERATE excursions of active trunk movement  · Standing:  · Static: GOOD: Takes MODERATE challenges from all directions   · Dynamic: FAIR+: Needs CLOSE SUPERVISION during gait and is able to right self with minor LOB    Outcome Measure: AM-PAC 6 CLICK MOBILITY  Total Score:20     Patient/Caregiver Education:     Therapist educated pt/caregiver regarding:    PT POC and goals for therapy    Safety with mobility and fall risk    Instruction on use of  call button and importance of calling nursing staff for assistance with mobility    Time provided for therapeutic counseling and discussion of current health disposition. All questions answered to satisfaction, within scope of PT practice     Patient/caregiver able to verbalize understanding and expressed no further questions this visit; will follow-up with pt/caregiver during current admit for additional questions/concerns within scope of practice.     White board updated.     Patient left HOB elevated with all lines intact and call button in reach.    GOALS:   Multidisciplinary Problems     Physical Therapy Goals        Problem: Physical Therapy Goal    Goal Priority Disciplines Outcome Goal Variances Interventions   Physical Therapy Goal     PT, PT/OT      Description: Pt has no acute PT needs, thus no goals created                       History:     Past Medical History:   Diagnosis Date    Hyperlipidemia     Hypertension     Major depression, single episode 9/13/2013    Morbid obesity with BMI of 40.0-44.9, adult 7/18/2016    AUREA (obstructive sleep apnea)     Primary osteoarthritis of left knee 7/18/2016    Screening for colorectal cancer 12/13/2018    Normal 2014 -10 yrs    Sinus tanja-tachy syndrome 7/15/2015    Syncope and collapse     vasovagal -sees EP Cards       Past Surgical History:   Procedure Laterality Date    INSERTION OF PACEMAKER  11/03/2020    knee scope      REMOVAL OF IMPLANTABLE LOOP RECORDER N/A 3/2/2020    Procedure: REMOVAL, IMPLANTABLE LOOP RECORDER;  Surgeon: Cy Villeda MD;  Location: [facility] EP LAB;  Service: Cardiology;  Laterality: N/A;  EOC, ILR Removal, RN Sedation, DM, 3 Prep    SINUS SURGERY  02/21/2022       Time Tracking:     PT Received On: 03/18/22  PT Start Time: 1318     PT Stop Time: 1350  PT Total Time (min): 32 min     Billable Minutes: Evaluation 1 unit and Therapeutic Activity 20 min    03/18/2022

## 2022-03-18 NOTE — TRANSFER OF CARE
"Anesthesia Transfer of Care Note    Patient: Black Preciado    Procedure(s) Performed: Procedure(s) (LRB):  ARTHROPLASTY, KNEE (Left)    Patient location: PACU    Anesthesia Type: CSE and MAC    Transport from OR: Transported from OR on 6-10 L/min O2 by face mask with adequate spontaneous ventilation    Post pain: adequate analgesia    Post assessment: no apparent anesthetic complications    Post vital signs: stable    Level of consciousness: responds to stimulation    Nausea/Vomiting: no nausea/vomiting    Complications: none    Transfer of care protocol was followed      Last vitals:   Visit Vitals  /62   Pulse 61   Temp 36.1 °C (97 °F) (Temporal)   Resp 13   Ht 5' 10" (1.778 m)   Wt 129.3 kg (285 lb)   SpO2 95%   BMI 40.89 kg/m²     "

## 2022-03-18 NOTE — PROGRESS NOTES
Spoke with the pacemaker clinic about patient medtronic pacemaker.   Was informed nothing needs to be done because the surgeon is working below the umbilicus.

## 2022-03-18 NOTE — INTERVAL H&P NOTE
The patient has been examined and the H&P has been reviewed:    I concur with the findings and no changes have occurred since H&P was written. Cleared for surgery. Everything in media tab in chart.    Anesthesia/Surgery risks, benefits and alternative options discussed and understood by patient/family.          There are no hospital problems to display for this patient.

## 2022-03-18 NOTE — PLAN OF CARE
OT Acute eval complete. Goals established. Pt required SBA in bed mobility and Min A to william/doff gown. Pt able to complete toileting in standing with SBA for standing balance. Pt ambulated ~60ft SBA using RW with v/c for RW management. Pt does not required Acute OT services and  is safe to d/c home with OP PT when medically appropriate.

## 2022-03-18 NOTE — PLAN OF CARE
Demarco completed. D/C 3/18 from acute PT service.    Problem: Physical Therapy Goal  Goal: Physical Therapy Goal  Description: Pt has no acute PT needs, thus no goals created    Outcome: Met     3/18/2022

## 2022-03-20 NOTE — ANESTHESIA POST-OP PAIN MANAGEMENT
Acute Pain Service Progress Note    03/20/2022    Called and spoke to Black Preciado about the Nimbus pump and PNC.  Pain has been well controlled.  Denies tinnitus, metallic taste in mouth, weakness in extremity.  Denies erythema, warmth, tenderness, bleeding at site of catheter entry.  Dressing c/d/i.  All questions answered.  Encouraged him to call the provided number if any issues arise.  Will follow up.              Trev Bourgeois MD   Fellow  Regional Anesthesiology and Acute Pain Medicine  [facility]

## 2022-03-21 ENCOUNTER — OFFICE VISIT (OUTPATIENT)
Dept: SPORTS MEDICINE | Facility: CLINIC | Age: 62
End: 2022-03-21
Payer: MEDICARE

## 2022-03-21 ENCOUNTER — TELEPHONE (OUTPATIENT)
Dept: SPORTS MEDICINE | Facility: CLINIC | Age: 62
End: 2022-03-21
Payer: MEDICARE

## 2022-03-21 ENCOUNTER — PATIENT MESSAGE (OUTPATIENT)
Dept: SPORTS MEDICINE | Facility: CLINIC | Age: 62
End: 2022-03-21

## 2022-03-21 ENCOUNTER — CLINICAL SUPPORT (OUTPATIENT)
Dept: REHABILITATION | Facility: HOSPITAL | Age: 62
End: 2022-03-21
Payer: MEDICARE

## 2022-03-21 DIAGNOSIS — M21.162 GENU VARUM, ACQUIRED, LEFT: ICD-10-CM

## 2022-03-21 DIAGNOSIS — M25.662 KNEE STIFFNESS, LEFT: ICD-10-CM

## 2022-03-21 DIAGNOSIS — M17.12 PRIMARY OSTEOARTHRITIS OF LEFT KNEE: ICD-10-CM

## 2022-03-21 DIAGNOSIS — Z96.652 S/P TOTAL KNEE REPLACEMENT, LEFT: Primary | ICD-10-CM

## 2022-03-21 DIAGNOSIS — R29.898 WEAKNESS OF LEFT LOWER EXTREMITY: ICD-10-CM

## 2022-03-21 DIAGNOSIS — R26.9 GAIT ABNORMALITY: Primary | ICD-10-CM

## 2022-03-21 PROCEDURE — 97162 PT EVAL MOD COMPLEX 30 MIN: CPT

## 2022-03-21 PROCEDURE — 1160F RVW MEDS BY RX/DR IN RCRD: CPT | Mod: CPTII,95,, | Performed by: PHYSICIAN ASSISTANT

## 2022-03-21 PROCEDURE — 1159F MED LIST DOCD IN RCRD: CPT | Mod: CPTII,95,, | Performed by: PHYSICIAN ASSISTANT

## 2022-03-21 PROCEDURE — 99024 POSTOP FOLLOW-UP VISIT: CPT | Mod: 95,,, | Performed by: PHYSICIAN ASSISTANT

## 2022-03-21 PROCEDURE — 99024 PR POST-OP FOLLOW-UP VISIT: ICD-10-PCS | Mod: 95,,, | Performed by: PHYSICIAN ASSISTANT

## 2022-03-21 PROCEDURE — 97110 THERAPEUTIC EXERCISES: CPT

## 2022-03-21 PROCEDURE — 1159F PR MEDICATION LIST DOCUMENTED IN MEDICAL RECORD: ICD-10-PCS | Mod: CPTII,95,, | Performed by: PHYSICIAN ASSISTANT

## 2022-03-21 PROCEDURE — 1160F PR REVIEW ALL MEDS BY PRESCRIBER/CLIN PHARMACIST DOCUMENTED: ICD-10-PCS | Mod: CPTII,95,, | Performed by: PHYSICIAN ASSISTANT

## 2022-03-21 NOTE — PROGRESS NOTES
I called the patient today regarding his surgery with Dr. Estela Trevino. The patient had a left TKA on 03/18/2022.    Pain Scale: 7 / 10    Any issues with Fever: No.    Any issues with medications (specifically DVT prophylaxis): No.   Taking ASA 325mg once daily    Any issues with bowel movements:  Passing santosh: No.                                                                 Urination: No.                                                                 Constipation: No.   No issues with the above.    Completing at home exercises: yes    Any concerns regarding their dressing/bandage:  No.    Discussed leaving aquacel in place    Patient confirmed first OP-PT appointment:  Today on at 11 am at [facility].    Any other concerns: No.     The patient was informed that if they have any urgent issues with their bandage, medications or any other health concerns regarding their surgery to call the 24/7 Orthopedic Post-op Hot Line at [phone]. The patient was reminded that if they have any chest pain or shortness of breath to call 911 or go to the ER.    The patient verbalized understanding and does not have any other questions

## 2022-03-21 NOTE — PROGRESS NOTES
See evaluation in POC for goals and assessment     Eval Date: 03/23/2022    Miriam Wilson, PT, DPT, CLT

## 2022-03-21 NOTE — ANESTHESIA POSTPROCEDURE EVALUATION
Anesthesia Post Evaluation    Patient: Black Preciado    Procedure(s) Performed: Procedure(s) (LRB):  ARTHROPLASTY, KNEE (Left)    Final Anesthesia Type: CSE      Patient location during evaluation: PACU  Patient participation: Yes- Able to Participate  Level of consciousness: awake and alert  Post-procedure vital signs: reviewed and stable  Pain management: adequate  Airway patency: patent    PONV status at discharge: No PONV  Anesthetic complications: no      Cardiovascular status: blood pressure returned to baseline  Respiratory status: unassisted  Hydration status: euvolemic  Follow-up not needed.          Vitals Value Taken Time   /56 03/18/22 1447   Temp 36.6 °C (97.9 °F) 03/18/22 1400   Pulse 60 03/18/22 1456   Resp 18 03/18/22 1445   SpO2 97 % 03/18/22 1456   Vitals shown include unvalidated device data.      Event Time   Out of Recovery 10:00:00         Pain/Alma Score: No data recorded

## 2022-03-23 ENCOUNTER — CLINICAL SUPPORT (OUTPATIENT)
Dept: REHABILITATION | Facility: HOSPITAL | Age: 62
End: 2022-03-23
Payer: MEDICARE

## 2022-03-23 ENCOUNTER — PATIENT MESSAGE (OUTPATIENT)
Dept: FAMILY MEDICINE | Facility: CLINIC | Age: 62
End: 2022-03-23
Payer: MEDICARE

## 2022-03-23 DIAGNOSIS — R29.898 WEAKNESS OF LEFT LOWER EXTREMITY: ICD-10-CM

## 2022-03-23 DIAGNOSIS — R26.9 GAIT ABNORMALITY: ICD-10-CM

## 2022-03-23 DIAGNOSIS — M25.662 KNEE STIFFNESS, LEFT: Primary | ICD-10-CM

## 2022-03-23 PROCEDURE — 97110 THERAPEUTIC EXERCISES: CPT

## 2022-03-23 PROCEDURE — 97140 MANUAL THERAPY 1/> REGIONS: CPT

## 2022-03-23 RX ORDER — TAMSULOSIN HYDROCHLORIDE 0.4 MG/1
0.4 CAPSULE ORAL DAILY
Qty: 90 CAPSULE | Refills: 11 | Status: SHIPPED | OUTPATIENT
Start: 2022-03-23 | End: 2023-06-26 | Stop reason: SDUPTHER

## 2022-03-23 RX ORDER — TAMSULOSIN HYDROCHLORIDE 0.4 MG/1
0.4 CAPSULE ORAL DAILY
Qty: 30 CAPSULE | Refills: 11 | Status: SHIPPED | OUTPATIENT
Start: 2022-03-23 | End: 2022-03-23 | Stop reason: SDUPTHER

## 2022-03-23 NOTE — PATIENT INSTRUCTIONS
Heel Prop    Sit in a chair with the heel of the involved leg propped on another chair. Allow gravity to stretch your knee towards a more straightened position.   *Can also assist by placing your hand just above the knee and gently pressing down toward the floor.   Hold 2-5 minutes. Perform 1 sessions each hour you are awake.      Hamstring Stretch    Sit in a chair with the heel of the involved leg propped on another chair. Keep your knee straight and lean forward until you feel a stretch in the back of your thigh.   Hold 30 seconds. Repeat 3 times per session. Perform 1 sessions each hour you are awake.      Ankle Pump    With left leg elevated, gently flex and extend ankle. Move through full range of motion. Avoid pain. Perform more frequently if having increased swelling.  Repeat 20 times each side per set. Do 2 sets per session. Perform 1 sessions each hour you are awake.      Strengthening: Quadriceps Set    Tighten muscles on top of thighs by pushing knees down into a rolled towel.  Hold 5 seconds. Repeat 10 times each leg per set. Perform 1 sessions each hour you are awake.      Extension, Short Arc Quads     Place 1-2 pillows under knees. Straighten the affected leg fully, lower and repeat.  Repeat 10 times each leg per set. Do 3 sets per session. Do 2 sessions per day.      Extension, Long Arc Quads       Straighten the affected leg fully. Lower, and repeat  Repeat 10 times each leg per set. Do 3 sets per session. Do 2 sessions per day.      Seated Marching    Sit with both feet flat. Lift and lower affected knee toward the ceiling. Repeat.  Repeat 10 times each per set. Do 3 sets per session. Do 2 sessions per day.      Straight Leg Raise    With the affected leg straight and the other leg bent, raise straight leg until both knees are even. Slowly lower.  Repeat 10 times each per set. Do 2 sets per session. Do 1 sessions per day.    Sidelying Hip Abduction     Lie on your side with the top leg straight  and bottom bent. Lift the top leg up and slowly return.   Repeat 10 times each per set. Do 2 sets per session. Do 1 sessions per day.      Copyright © 2127-8530 [employer]  Copyright © I. All rights reserved.

## 2022-03-23 NOTE — ADDENDUM NOTE
Addendum  created 03/22/22 1955 by Noman Aguilar MD    Clinical Note Signed, Intraprocedure Event edited

## 2022-03-23 NOTE — PLAN OF CARE
[facility]  Physical Therapy Initial Evaluation    Name: Black Preciado  Clinic Number: [id number]    Therapy Diagnosis:   Encounter Diagnoses   Name Primary?    Primary osteoarthritis of left knee     Genu varum, acquired, left     Gait abnormality Yes    Knee stiffness, left     Weakness of left lower extremity      Physician: Carmina Zeng, PEDRO    Physician Orders: PT Eval and Treat   Medical Diagnosis from Referral: M17.12 (ICD-10-CM) - Primary osteoarthritis of left knee   M21.162 (ICD-10-CM) - Acquired genu varum, left   M25.561,M25.562,G89.29 (ICD-10-CM) - Chronic pain of both knees  Evaluation Date: 3/21/2022  Authorization Period Expiration: 4/4/2022  Plan of Care Expiration: 5/4/2022  Visit # / Visits authorized: 1/1  FOTO: 1/10    Time In: 11:02  Time Out: 12:00pm   Total Billable Time: 58 minutes    Precautions: Standard, hx of syncope     Subjective     Date of surgery: 3/18/2022    History of current condition - Black reports: he had a left total knee arthroplasty. He has been doing pretty good but he fell yesterday morning. He was able to get himself up but said he did fall on his knee. He spoke with his care team and reports he is pretty sure he mentioned his fall.  His pain pump has been leaking blood. His aquacel bandage was rolling down a but so his wife applied mailing tape     Past Medical History:   Diagnosis Date    Hyperlipidemia     Hypertension     Major depression, single episode 9/13/2013    Morbid obesity with BMI of 40.0-44.9, adult 7/18/2016    AUREA (obstructive sleep apnea)     Primary osteoarthritis of left knee 7/18/2016    Screening for colorectal cancer 12/13/2018    Normal 2014 -10 yrs    Sinus tanja-tachy syndrome 7/15/2015    Syncope and collapse     vasovagal -sees EP Cards     Black Preciado  has a past surgical history that includes knee scope; Removal of implantable loop recorder (N/A, 3/2/2020); Sinus surgery  (02/21/2022); Insertion of pacemaker (11/03/2020); and Knee Arthroplasty (Left, 3/18/2022).    Black has a current medication list which includes the following prescription(s): aspirin, aspirin, atorvastatin, azelastine, celecoxib, chlorthalidone, epinephrine, fish oil-omega-3 fatty acids, fluticasone propionate, hydrocodone-acetaminophen, methocarbamol, multivitamin, oxycodone-acetaminophen, promethazine, and sulfamethoxazole-trimethoprim 800-160mg, and the following Facility-Administered Medications: sodium chloride 0.9% and vancomycin in dextrose 5 %.    Review of patient's allergies indicates:   Allergen Reactions    Penicillins Other (See Comments)     Unknown reaction as a baby        Imaging, FINDINGS:  Postoperative changes are now identified relating to an interval left knee arthroplasty procedure, prostheses appearing unremarkable.  No unusual postoperative findings or significant detrimental interval change since the preoperative exam of 11/09/2021 appreciated.         Prior Therapy: yes   Social History: lives with wife  Occupation: not working   Prior Level of Function: Independent   Current Level of Function: dressing, bathing     Pain:  Current 7/10, worst 10/10, best 0/10   Location: left knee   Description: Aching  Aggravating Factors: Standing  Easing Factors: pain medication, ice and rest    Patient's goals: Get back to normal function     Objective     Pt was offered wheelchair at waiting room but preferred to walk to clinic   Pt arrives to clinic with bruising of the medial knee, says where he fell on his knee. No intense heat of skin. Aquacel has mailing tape on it, applied by pts wife   No blood seen through aquacel   Pain pump leaking blood     Posture: forward head   Palpation: minor tenderness around knee joint     Active range of motion:  Right knee: 0-117  Left knee: -6-0-60    Passive range of motion: Not performed- pt reports fall       Lower extremity srength with MicroFET handheld  "dynamometer Right Left Pain/dysfunction with movement   (approx 4 sec hold w/ max contraction)   Hip flexion 8.5 kg  6.1 kg     Hip abduction 10.3 kg  6.4 kg     Quadriceps 11.4 kg  3.9 kg     Hamstrings 13.2 kg  5.1 kg       Timed Up and Go:  31 seconds (with assistive device)    30" Chair Stand: 5 with upper extremity support;     Gait Analysis: Modified independent with rolling walker: step to gait pattern on L side, decreased knee flexion and extension, hip flexion over walker in standing     Impairment/Limitation/Restriction for KOOS JR. Score on FOTO Knee Survey    Therapist reviewed KOOS scores for Black Preciado on 3/21/2022.   KOOS documents entered into [employer] - see Media section.    Limitation Score: not taken     Limitation for Total FOTO Knee Survey     TREATMENT     Total Treatment time separate from Evaluation: 30 minutes    Black received therapeutic exercises to develop strength, endurance, ROM, flexibility and posture for 30 minutes including:    Quad sets: 5"x10  Short arc quads: x10   Straight leg raise: x5   Sidelying hip abduction: x5   Long arc quads: x10   Seated marching: x10   Seated heel prop: 3'   Seated hamstring stretch in heel prop: 3x30"     Black was offered a cold pack but denied the need for one, saying he would use it at home     Home Exercises and Patient Education Provided:    Education provided:   - Findings; prognosis and plan of care  - Home exercise program  - Modality options  - Therapist contact information    Written Home Exercises Provided: Yes.  Exercises were reviewed and Black was able to demonstrate them prior to the end of the session.  Black demonstrated good  understanding of the education provided.     See electronic medical record under Notes-Patient Instructions for exercises provided 3/21/2022.    Assessment     Black is a 61 y.o. male referred to outpatient Physical Therapy with a medical diagnosis of M17.12 (ICD-10-CM) - Primary osteoarthritis of " left knee   M21.162 (ICD-10-CM) - Acquired genu varum, left   M25.561,M25.562,G89.29 (ICD-10-CM) - Chronic pain of both knees   . Patient presents to initial evaluation post-op day 3 with increased swelling, decreased knee ROM, knee pain, and limited functional mobility and endurance. In regards to pts report of a fall, pt's care team was notified to confirm they were aware. Pt's aquacel appeared clear and did not show signs of blood leaking from broken sutures. Pt was able to ambulate and perform his exercises with no issues, showing no red flags In terms of PT. Pt showed good quad contraction with exercises and reports he performed these with prehab. As a precaution, PROM measurements were not performed on L knee. Pt was instructed to reach out to his care team through the hotline with any reports of falls, signs of infections, or concerns about the bandages. The mailing tape was removed from the pt's skin and Tegaderm was applied to the rolled down aquacel and leaking pain pump bandaging. Pt would benefit from PT to decrease pain, improve function, and increase independence.     Patient prognosis is Good.   Patient will benefit from skilled outpatient physical therapy to address the deficits stated above and in the chart below, provide pt/family education, and to maximize pt's level of independence.     Plan of care discussed with patient: Yes  Pt's spiritual, cultural and educational needs considered and patient is agreeable to the plan of care and goals as stated below:     Anticipated barriers for therapy: none    Medical necessity is demonstrated by the following  History  Co-morbidities and personal factors that may impact the plan of care Co-morbidities:   high BMI and HTN    Personal Factors:   no deficits     moderate   Examination  Body Structures and Functions, activity limitations and participation restrictions that may impact the plan of care Body Regions:   lower extremities    Body Systems:    gross  symmetry  ROM  strength  balance  gait  transfers  transitions  edema  scar formation    Participation Restrictions:   none    Activity limitations:   Learning and applying knowledge  no deficits    General Tasks and Commands  no deficits    Communication  no deficits    Mobility  lifting and carrying objects  walking    Self care  washing oneself (bathing, drying, washing hands)  dressing    Domestic Life  doing house work (cleaning house, washing dishes, laundry)    Interactions/Relationships  no deficits    Life Areas  no deficits    Community and Social Life  no deficits         moderate   Clinical Presentation evolving clinical presentation with changing clinical characteristics moderate   Decision Making/ Complexity Score: moderate     Short Term Goals (3 Weeks):   1. Patient will be independent with home exercise program to supplement physical therapy treatment in improving functional status.  2. Patient will improve (L) lower extremity strength to at least 75% of (R) lower extremity strength as measured via MicroFet handheld dynamometer to improve strength for closed chain tasks.   3. Patient will improve (L) knee active range of motion to 0-90 degrees to promote increased ease of sit<>stand transfers.  4. Patient will perform timed up and go with less restrictive assistive device in < 20 seconds to improve gait speed and safety with community ambulation.     Long Term Goals (6 Weeks):   1. Patient will improve (L) lower extremity strength to at least 90% of (R) lower extremity strength as measured via MicroFet handheld dynamometer to improve strength for closed chain tasks.   2. Patient will improve the total FOTO Knee Survey Score to </= 30% limited to demonstrate increased perceived functional mobility.  3. Patient will improve score on KOOS Jr. section of FOTO Knee Survey to = </= 70% to demonstrate increased perceived functional mobility.  4. Patient will perform timed up and go with least restrictive  "assistive device in < 13.5 seconds to improve gait speed and safety with community ambulation.  5. Patient will perform at least 17 sit to stands in 30 seconds without UE support to demonstrate increased functional strength.   6. Patient will improve (L) knee active range of motion to 0-120 degrees to promote higher level transfers and transitions without limitation.     Timed Up and Go Cutoff Scores:        30" Chair Stand Cutoff Scores:          Plan     Plan of care certification: 3/21/2022 to  5/4/2022.    Outpatient Physical Therapy 4 times weekly for 6 weeks to include the following interventions: Gait Training, Manual Therapy, Moist Heat/ Ice, Neuromuscular Re-ed, Orthotic Management and Training, Patient Education, Therapeutic Activites and Therapeutic Exercise, Instrument Assisted Soft Tissue Mobilization (IASTM), Kinesiotape    Miriam Wilson, PT, DPT          "

## 2022-03-23 NOTE — PROGRESS NOTES
[facility]   Physical Therapy Treatment Note     Name: Black Preciado  Clinic Number: [id number]    Therapy Diagnosis:   Encounter Diagnoses   Name Primary?    Knee stiffness, left Yes    Gait abnormality     Weakness of left lower extremity      Physician: Noman Jeffery, *    Visit Date: 3/23/2022    Physician Orders: PT Eval and Treat   Medical Diagnosis from Referral: M17.12 (ICD-10-CM) - Primary osteoarthritis of left knee   M21.162 (ICD-10-CM) - Acquired genu varum, left   M25.561,M25.562,G89.29 (ICD-10-CM) - Chronic pain of both knees  Evaluation Date: 3/21/2022  Authorization Period Expiration: 4/4/2022  Plan of Care Expiration: 5/4/2022  Visit # / Visits authorized: 2/25  FOTO: 2/10     Time In: 11:00  Time Out: 12:00pm   Total Billable Time: 58 minutes     Precautions: Standard, hx of syncope     SUBJECTIVE     Pt reports: Pt experiencing more pain today due to pain pump being removed yesterday. Pt ambulating into clinic with TENS on left knee and RW.   He was compliant with home exercise program.  Response to previous treatment: Felt better; was more mobile   Functional change: decreased knee mobility, increased swelling     Pain: 8/10  Location: left knee    OBJECTIVE     Objective Measures updated at progress report unless specified.     Treatment     Black received the treatments listed below:      Black received therapeutic exercises to develop strength, endurance, ROM, flexibility and posture for 42 minutes including:    Heel prop on 1/2 foam: 1 min 30 sec, 2 times   Quad sets w/ towel under knee: 5 minutes; 5 sec hold   Quad sets w/ towel under ankle: 5 minutes; 5 sec hold   Short arc quads: 3 sets x 10 reps; 3 sec hold   Long arc quads: 3 sets x 8 reps   Heel slides w/ green strap; 3 sec hold 20x    MANUAL THERAPY TECHNIQUES including Joint mobilizations and Soft tissue Mobilization were applied to left knee for 15 minutes:   -Seated at edge of mat; knee  flexion      Patient Education and Home Exercises     Home Exercises Provided and Patient Education Provided     Education provided:   - HEP given at initial visit   - Importance of bending knee at home and not only performing extension exercises.    Written Home Exercises Provided: yes. Exercises were reviewed and Black was able to demonstrate them prior to the end of the session.  Black demonstrated good  understanding of the education provided. See EMR under Patient Instructions for exercises provided during therapy sessions    ASSESSMENT   Black is a 61 y.o. male referred to outpatient Physical Therapy with a medical diagnosis of M17.12 (ICD-10-CM) - Primary osteoarthritis of left knee M21.162 (ICD-10-CM) - Acquired genu varum, left ;M25.561,M25.562,G89.29 (ICD-10-CM) - Chronic pain of both knees. Pt with noticeable swelling in quadriceps and ankle, ambulating into clinic with TENS unit to assist with pain modulation. Pt needed new CRYSTAL Hose due to leakage and increased swelling. Pt extension improving but due to swelling pt flexion was limited but improved after manual stretching by PT at edge of mat. Pt with moderate pain complaints with active flexion to extension due to swelling and tissue extensibility. Pt still showing extension lag with SAQ, will resume straight leg raises when patient can maintain 0 degrees active extension.     Black Is progressing well towards his goals.   Pt prognosis is Excellent.     Pt will continue to benefit from skilled outpatient physical therapy to address the deficits listed in the problem list box on initial evaluation, provide pt/family education and to maximize pt's level of independence in the home and community environment.     Pt's spiritual, cultural and educational needs considered and pt agreeable to plan of care and goals.     Anticipated barriers to physical therapy: None to note     Short Term Goals (3 Weeks):   1. Patient will be independent with home  exercise program to supplement physical therapy treatment in improving functional status.  2. Patient will improve (L) lower extremity strength to at least 75% of (R) lower extremity strength as measured via MicroFet handheld dynamometer to improve strength for closed chain tasks.   3. Patient will improve (L) knee active range of motion to 0-90 degrees to promote increased ease of sit<>stand transfers.  4. Patient will perform timed up and go with less restrictive assistive device in < 20 seconds to improve gait speed and safety with community ambulation.     Long Term Goals (6 Weeks):   1. Patient will improve (L) lower extremity strength to at least 90% of (R) lower extremity strength as measured via MicroFet handheld dynamometer to improve strength for closed chain tasks.   2. Patient will improve the total FOTO Knee Survey Score to </= 30% limited to demonstrate increased perceived functional mobility.  3. Patient will improve score on KOOS Jr. section of FOTO Knee Survey to = </= 70% to demonstrate increased perceived functional mobility.  4. Patient will perform timed up and go with least restrictive assistive device in < 13.5 seconds to improve gait speed and safety with community ambulation.  5. Patient will perform at least 17 sit to stands in 30 seconds without UE support to demonstrate increased functional strength.   6. Patient will improve (L) knee active range of motion to 0-120 degrees to promote higher level transfers and transitions without limitation.     PLAN   Improve active extension and flexion then progress to strengthening.     Angeles Marisa, PT, DPT

## 2022-03-24 NOTE — PROGRESS NOTES
[facility]   Physical Therapy Treatment Note     Name: Black Preciado  Clinic Number: [id number]    Therapy Diagnosis:   Encounter Diagnoses   Name Primary?    Knee stiffness, left Yes    Gait abnormality     Weakness of lower extremity, unspecified laterality      Physician: Noman Jeffery, *    Visit Date: 3/25/2022    Physician Orders: PT Eval and Treat   Medical Diagnosis from Referral: M17.12 (ICD-10-CM) - Primary osteoarthritis of left knee   M21.162 (ICD-10-CM) - Acquired genu varum, left   M25.561,M25.562,G89.29 (ICD-10-CM) - Chronic pain of both knees  Evaluation Date: 3/21/2022  Authorization Period Expiration: 4/4/2022  Plan of Care Expiration: 5/4/2022  Visit # / Visits authorized: 3/25  FOTO: 2/10    PTA visit : 1/5     Time In: 9:00 am  Time Out: 10:05 am  Total treatment time : 65 minutes   Total Billable Time: 30 minutes     Precautions: Standard, hx of syncope     SUBJECTIVE     Pt reports: feeling sore and it feels like a tight rubber band in his knee.   He was compliant with home exercise program.  Response to previous treatment: no adverse effects   Functional change: decreased knee mobility, increased swelling     Pain: 7/10  Location: left knee    OBJECTIVE     Objective Measures updated at progress report unless specified.     L knee AAROM : 0 - 0 - 85 degrees AAROM     Treatment     Black received the treatments listed below:      Black received therapeutic exercises to develop strength, endurance, ROM, flexibility and posture for 55 minutes including:    Heel prop on 1/2 foam: 3' min,  Quad sets w/ half bolster under knee: 5 minutes; 5 sec hold   Quad sets w/ half bolster under ankle: 5 minutes; 5 sec hold   Short arc quads medium bolster: 3 sets x 10 reps; 3 sec hold   Long arc quads: 3 sets x 8 reps   Heel slides w/ green strap; 3 sec hold 20x    MANUAL THERAPY TECHNIQUES including Joint mobilizations and Soft tissue Mobilization were applied to  left knee for 0 minutes:   -Seated at edge of mat; knee flexion      Pt received CP for 10' to L knee with LE elevated for pain relief .     Patient Education and Home Exercises     Home Exercises Provided and Patient Education Provided     Education provided:   - HEP given at initial visit   - Importance of bending knee at home and not only performing extension exercises.    Written Home Exercises Provided: yes. Exercises were reviewed and Black was able to demonstrate them prior to the end of the session.  Black demonstrated good  understanding of the education provided. See EMR under Patient Instructions for exercises provided during therapy sessions    ASSESSMENT     Pt presents s/p 7 days TKA . Pt exhibits 0 degrees of knee extension and 85 degrees of knee flexion . Pt still exhibits a moderate amount of swelling likely limiting knee flexion ROM at this time and was educated on continued use of CRYSTAL hose , ice and elevation to address . Pt demonstrated good quadriceps activation although does fatigue quickly with exercises requiring frequent rest breaks .      Black Is progressing well towards his goals.   Pt prognosis is Excellent.     Pt will continue to benefit from skilled outpatient physical therapy to address the deficits listed in the problem list box on initial evaluation, provide pt/family education and to maximize pt's level of independence in the home and community environment.   Pt's spiritual, cultural and educational needs considered and pt agreeable to plan of care and goals.     Anticipated barriers to physical therapy: None to note     Short Term Goals (3 Weeks):   1. Patient will be independent with home exercise program to supplement physical therapy treatment in improving functional status.  2. Patient will improve (L) lower extremity strength to at least 75% of (R) lower extremity strength as measured via MicroFet handheld dynamometer to improve strength for closed chain tasks.   3.  Patient will improve (L) knee active range of motion to 0-90 degrees to promote increased ease of sit<>stand transfers.  4. Patient will perform timed up and go with less restrictive assistive device in < 20 seconds to improve gait speed and safety with community ambulation.     Long Term Goals (6 Weeks):   1. Patient will improve (L) lower extremity strength to at least 90% of (R) lower extremity strength as measured via MicroFet handheld dynamometer to improve strength for closed chain tasks.   2. Patient will improve the total FOTO Knee Survey Score to </= 30% limited to demonstrate increased perceived functional mobility.  3. Patient will improve score on KOOS Jr. section of FOTO Knee Survey to = </= 70% to demonstrate increased perceived functional mobility.  4. Patient will perform timed up and go with least restrictive assistive device in < 13.5 seconds to improve gait speed and safety with community ambulation.  5. Patient will perform at least 17 sit to stands in 30 seconds without UE support to demonstrate increased functional strength.   6. Patient will improve (L) knee active range of motion to 0-120 degrees to promote higher level transfers and transitions without limitation.     PLAN   Improve active extension and flexion then progress to strengthening.     Melissa Nix, PTA

## 2022-03-25 ENCOUNTER — CLINICAL SUPPORT (OUTPATIENT)
Dept: REHABILITATION | Facility: HOSPITAL | Age: 62
End: 2022-03-25
Payer: MEDICARE

## 2022-03-25 DIAGNOSIS — R26.9 GAIT ABNORMALITY: ICD-10-CM

## 2022-03-25 DIAGNOSIS — M25.662 KNEE STIFFNESS, LEFT: Primary | ICD-10-CM

## 2022-03-25 DIAGNOSIS — R29.898 WEAKNESS OF LOWER EXTREMITY, UNSPECIFIED LATERALITY: ICD-10-CM

## 2022-03-25 LAB
COMMENT: NORMAL
FINAL PATHOLOGIC DIAGNOSIS: NORMAL
GROSS: NORMAL
Lab: NORMAL

## 2022-03-25 PROCEDURE — 97110 THERAPEUTIC EXERCISES: CPT | Mod: CQ

## 2022-03-27 ENCOUNTER — PATIENT MESSAGE (OUTPATIENT)
Dept: FAMILY MEDICINE | Facility: CLINIC | Age: 62
End: 2022-03-27
Payer: MEDICARE

## 2022-03-28 ENCOUNTER — CLINICAL SUPPORT (OUTPATIENT)
Dept: REHABILITATION | Facility: HOSPITAL | Age: 62
End: 2022-03-28
Payer: MEDICARE

## 2022-03-28 DIAGNOSIS — M25.662 KNEE STIFFNESS, LEFT: Primary | ICD-10-CM

## 2022-03-28 DIAGNOSIS — R29.898 WEAKNESS OF LOWER EXTREMITY, UNSPECIFIED LATERALITY: ICD-10-CM

## 2022-03-28 DIAGNOSIS — R26.9 GAIT ABNORMALITY: ICD-10-CM

## 2022-03-28 PROCEDURE — 97110 THERAPEUTIC EXERCISES: CPT | Mod: CQ

## 2022-03-28 NOTE — PROGRESS NOTES
[facility]   Physical Therapy Treatment Note     Name: Black Preciado  Clinic Number: [id number]    Therapy Diagnosis:   Encounter Diagnoses   Name Primary?    Knee stiffness, left Yes    Gait abnormality     Weakness of lower extremity, unspecified laterality      Physician: Noman Jeffery, *    Visit Date: 3/28/2022    Physician Orders: PT Eval and Treat   Medical Diagnosis from Referral: M17.12 (ICD-10-CM) - Primary osteoarthritis of left knee   M21.162 (ICD-10-CM) - Acquired genu varum, left   M25.561,M25.562,G89.29 (ICD-10-CM) - Chronic pain of both knees  Evaluation Date: 3/21/2022  Authorization Period Expiration: 4/4/2022  Plan of Care Expiration: 5/4/2022  Visit # / Visits authorized: 4/25  FOTO: 2/10    PTA visit : 2/5     Time In: 11:00 am  Time Out: 11:10 am  Total treatment time : 70 minutes   Total Billable Time: 60 minutes     Precautions: Standard, hx of syncope     SUBJECTIVE     Pt reports: he has been working hard on his exercises at home and is determined to get his knee better.   He was compliant with home exercise program.  Response to previous treatment: increased muscular soreness  Functional change: decreased knee mobility, increased swelling     Pain: 5/10  Location: left knee    OBJECTIVE     Objective Measures updated at progress report unless specified.     3/25/2022: L knee AAROM : 0 - 0 - 85 degrees AAROM     3/28/2022: Pt presents amb c/ SPC                    L knee AAROM : 0-0-86     Treatment     Black received the treatments listed below:      Black received therapeutic exercises to develop strength, endurance, ROM, flexibility and posture for 55 minutes including:    Heel prop on 1/2 foam: 3' min  Quad sets w/ half bolster under knee: 3 x 10 reps ; 5 sec hold   Quad sets w/ half bolster under ankle: 3 x 10 reps; 5 sec hold   Short arc quads medium bolster: 3 sets x 10 reps; 3 sec hold   + Straight leg raise c/ quad set : 3 x 5 reps    Long arc quads: 3 sets x 10 reps   Heel slides w/ green strap; 3 sec hold 20x  Gait training for 100' with SPC : SBA and cues for heel toe gait pattern and cane sequencing       MANUAL THERAPY TECHNIQUES including Joint mobilizations and Soft tissue Mobilization were applied to left knee for 5 minutes:   -Seated at edge of mat; knee flexion      Pt received CP for 10' to L knee with LE elevated for pain relief .     Patient Education and Home Exercises     Home Exercises Provided and Patient Education Provided     Education provided:   - HEP given at initial visit   - Importance of bending knee at home and not only performing extension exercises.    Written Home Exercises Provided: yes. Exercises were reviewed and Black was able to demonstrate them prior to the end of the session.  Black demonstrated good  understanding of the education provided. See EMR under Patient Instructions for exercises provided during therapy sessions    ASSESSMENT     Pt presents s/p 10 days TKA.  Pt exhibits 0 degrees of terminal knee extension and 86 degrees of knee flexion . Pt demonstrates good quadriceps activation and is able to independently perform SAQs, LAQs and initiation of SLR today with ability to perform maintaining TKE. Pt still exhibits a moderate amount of swelling likely limiting knee flexion ROM at this time and was educated on continued use of CRYSTAL hose , ice and elevation to address . Pt demonstrates good tolerance to ambulation with SPC and with good form noted with min cues required . Will continue to progress next per TKA protocol .     Black Is progressing well towards his goals.   Pt prognosis is Excellent.     Pt will continue to benefit from skilled outpatient physical therapy to address the deficits listed in the problem list box on initial evaluation, provide pt/family education and to maximize pt's level of independence in the home and community environment.   Pt's spiritual, cultural and educational  needs considered and pt agreeable to plan of care and goals.     Anticipated barriers to physical therapy: None to note     Short Term Goals (3 Weeks):   1. Patient will be independent with home exercise program to supplement physical therapy treatment in improving functional status.  2. Patient will improve (L) lower extremity strength to at least 75% of (R) lower extremity strength as measured via MicroFet handheld dynamometer to improve strength for closed chain tasks.   3. Patient will improve (L) knee active range of motion to 0-90 degrees to promote increased ease of sit<>stand transfers.  4. Patient will perform timed up and go with less restrictive assistive device in < 20 seconds to improve gait speed and safety with community ambulation.     Long Term Goals (6 Weeks):   1. Patient will improve (L) lower extremity strength to at least 90% of (R) lower extremity strength as measured via MicroFet handheld dynamometer to improve strength for closed chain tasks.   2. Patient will improve the total FOTO Knee Survey Score to </= 30% limited to demonstrate increased perceived functional mobility.  3. Patient will improve score on KOOS Jr. section of FOTO Knee Survey to = </= 70% to demonstrate increased perceived functional mobility.  4. Patient will perform timed up and go with least restrictive assistive device in < 13.5 seconds to improve gait speed and safety with community ambulation.  5. Patient will perform at least 17 sit to stands in 30 seconds without UE support to demonstrate increased functional strength.   6. Patient will improve (L) knee active range of motion to 0-120 degrees to promote higher level transfers and transitions without limitation.     PLAN   Improve active extension and flexion then progress to strengthening.     Melissa Nix, PTA

## 2022-03-30 ENCOUNTER — CLINICAL SUPPORT (OUTPATIENT)
Dept: REHABILITATION | Facility: HOSPITAL | Age: 62
End: 2022-03-30
Payer: MEDICARE

## 2022-03-30 DIAGNOSIS — R29.898 WEAKNESS OF LOWER EXTREMITY, UNSPECIFIED LATERALITY: ICD-10-CM

## 2022-03-30 DIAGNOSIS — M25.662 KNEE STIFFNESS, LEFT: Primary | ICD-10-CM

## 2022-03-30 DIAGNOSIS — R26.9 GAIT ABNORMALITY: ICD-10-CM

## 2022-03-30 PROCEDURE — 97110 THERAPEUTIC EXERCISES: CPT | Mod: CQ

## 2022-04-01 ENCOUNTER — OFFICE VISIT (OUTPATIENT)
Dept: SPORTS MEDICINE | Facility: CLINIC | Age: 62
End: 2022-04-01
Payer: MEDICARE

## 2022-04-01 ENCOUNTER — PATIENT MESSAGE (OUTPATIENT)
Dept: FAMILY MEDICINE | Facility: CLINIC | Age: 62
End: 2022-04-01
Payer: MEDICARE

## 2022-04-01 ENCOUNTER — HOSPITAL ENCOUNTER (OUTPATIENT)
Dept: RADIOLOGY | Facility: HOSPITAL | Age: 62
Discharge: HOME | End: 2022-04-01
Attending: PHYSICIAN ASSISTANT
Payer: MEDICARE

## 2022-04-01 ENCOUNTER — CLINICAL SUPPORT (OUTPATIENT)
Dept: REHABILITATION | Facility: HOSPITAL | Age: 62
End: 2022-04-01
Payer: MEDICARE

## 2022-04-01 VITALS
DIASTOLIC BLOOD PRESSURE: 86 MMHG | BODY MASS INDEX: 40.8 KG/M2 | HEIGHT: 70 IN | WEIGHT: 285 LBS | SYSTOLIC BLOOD PRESSURE: 129 MMHG | HEART RATE: 74 BPM

## 2022-04-01 DIAGNOSIS — R26.9 GAIT ABNORMALITY: ICD-10-CM

## 2022-04-01 DIAGNOSIS — M25.662 KNEE STIFFNESS, LEFT: Primary | ICD-10-CM

## 2022-04-01 DIAGNOSIS — Z96.652 S/P TOTAL KNEE REPLACEMENT, LEFT: ICD-10-CM

## 2022-04-01 DIAGNOSIS — R29.898 WEAKNESS OF LOWER EXTREMITY, UNSPECIFIED LATERALITY: ICD-10-CM

## 2022-04-01 DIAGNOSIS — Z96.652 S/P TOTAL KNEE REPLACEMENT, LEFT: Primary | ICD-10-CM

## 2022-04-01 PROCEDURE — 99024 PR POST-OP FOLLOW-UP VISIT: ICD-10-PCS | Mod: S$GLB,,, | Performed by: PHYSICIAN ASSISTANT

## 2022-04-01 PROCEDURE — 73560 X-RAY EXAM OF KNEE 1 OR 2: CPT | Mod: 26,LT,, | Performed by: RADIOLOGY

## 2022-04-01 PROCEDURE — 99024 POSTOP FOLLOW-UP VISIT: CPT | Mod: S$GLB,,, | Performed by: PHYSICIAN ASSISTANT

## 2022-04-01 PROCEDURE — 3074F SYST BP LT 130 MM HG: CPT | Mod: CPTII,S$GLB,, | Performed by: PHYSICIAN ASSISTANT

## 2022-04-01 PROCEDURE — 3079F PR MOST RECENT DIASTOLIC BLOOD PRESSURE 80-89 MM HG: ICD-10-PCS | Mod: CPTII,S$GLB,, | Performed by: PHYSICIAN ASSISTANT

## 2022-04-01 PROCEDURE — 99999 PR PBB SHADOW E&M-EST. PATIENT-LVL IV: CPT | Mod: PBBFAC,,, | Performed by: PHYSICIAN ASSISTANT

## 2022-04-01 PROCEDURE — 3079F DIAST BP 80-89 MM HG: CPT | Mod: CPTII,S$GLB,, | Performed by: PHYSICIAN ASSISTANT

## 2022-04-01 PROCEDURE — 73560 X-RAY EXAM OF KNEE 1 OR 2: CPT | Mod: TC,LT

## 2022-04-01 PROCEDURE — 1160F RVW MEDS BY RX/DR IN RCRD: CPT | Mod: CPTII,S$GLB,, | Performed by: PHYSICIAN ASSISTANT

## 2022-04-01 PROCEDURE — 3008F PR BODY MASS INDEX (BMI) DOCUMENTED: ICD-10-PCS | Mod: CPTII,S$GLB,, | Performed by: PHYSICIAN ASSISTANT

## 2022-04-01 PROCEDURE — 99999 PR PBB SHADOW E&M-EST. PATIENT-LVL IV: ICD-10-PCS | Mod: PBBFAC,,, | Performed by: PHYSICIAN ASSISTANT

## 2022-04-01 PROCEDURE — 3008F BODY MASS INDEX DOCD: CPT | Mod: CPTII,S$GLB,, | Performed by: PHYSICIAN ASSISTANT

## 2022-04-01 PROCEDURE — 1159F PR MEDICATION LIST DOCUMENTED IN MEDICAL RECORD: ICD-10-PCS | Mod: CPTII,S$GLB,, | Performed by: PHYSICIAN ASSISTANT

## 2022-04-01 PROCEDURE — 73560 XR KNEE 1 OR 2 VIEW LEFT: ICD-10-PCS | Mod: 26,LT,, | Performed by: RADIOLOGY

## 2022-04-01 PROCEDURE — 1160F PR REVIEW ALL MEDS BY PRESCRIBER/CLIN PHARMACIST DOCUMENTED: ICD-10-PCS | Mod: CPTII,S$GLB,, | Performed by: PHYSICIAN ASSISTANT

## 2022-04-01 PROCEDURE — 97110 THERAPEUTIC EXERCISES: CPT | Mod: CQ

## 2022-04-01 PROCEDURE — 3074F PR MOST RECENT SYSTOLIC BLOOD PRESSURE < 130 MM HG: ICD-10-PCS | Mod: CPTII,S$GLB,, | Performed by: PHYSICIAN ASSISTANT

## 2022-04-01 PROCEDURE — 1159F MED LIST DOCD IN RCRD: CPT | Mod: CPTII,S$GLB,, | Performed by: PHYSICIAN ASSISTANT

## 2022-04-01 RX ORDER — OXYCODONE AND ACETAMINOPHEN 5; 325 MG/1; MG/1
1 TABLET ORAL EVERY 6 HOURS PRN
Qty: 28 TABLET | Refills: 0 | Status: SHIPPED | OUTPATIENT
Start: 2022-04-01 | End: 2023-03-09

## 2022-04-01 NOTE — PROGRESS NOTES
[facility]   Physical Therapy Treatment Note     Name: Black Preciado  Clinic Number: [id number]    Therapy Diagnosis:   Encounter Diagnoses   Name Primary?    Knee stiffness, left Yes    Gait abnormality     Weakness of lower extremity, unspecified laterality      Physician: Noman Jeffery, *    Visit Date: 2022    Physician Orders: PT Eval and Treat   Medical Diagnosis from Referral: M17.12 (ICD-10-CM) - Primary osteoarthritis of left knee   M21.162 (ICD-10-CM) - Acquired genu varum, left   M25.561,M25.562,G89.29 (ICD-10-CM) - Chronic pain of both knees  Evaluation Date: 3/21/2022  Authorization Period Expiration: 2022  Plan of Care Expiration: 2022  Visit # / Visits authorized:  + eval    PTA visit :      Time In: 0855  Time Out: 1010  Total Treatment Time: 75 minutes   Total Billable Time: 65 minutes (4 TE)     Precautions: Standard, hx of syncope     SUBJECTIVE     Patient reports: that his knee feels ok today. He was sore after last session. Seeing MD after this visit to have bandage removed.  He was compliant with home exercise program. Performs 2-3x/day.   Response to previous treatment: good, no adverse reaction; increased soreness after last session  Functional change: was able to go grocery shopping and hold on to the [employer]; ambulates around his home with no AD, denies knee instability or buckling    Pain: 4/10, currently  Location: Left knee    OBJECTIVE     Objective Measures updated at progress report unless specified.   DOS 3/18/2022  6 week follow up 2022    3/25/2022: L knee AAROM : 0 - 0 - 85 degrees AAROM   3/28/2022: Pt presents amb c/ SPC                    L knee AAROM : 0-0-86   3/30/2022: STS: 5 no AD                    TU seconds                      L knee : 0-0-86 AAROM  2022: 0-92 degrees AAROM with strap   Seated at EOM: 81 degrees of Flexion PROM    Treatment     Black received the treatments listed below:       Black received therapeutic exercises to develop strength, endurance, ROM, flexibility and posture for 55 minutes including:    *BOLD = progression/addition of exercise    +Aerobic Activity for AAROM and to improve mobility for 5 minutes; half-revolutions, seat 14    Quad sets with half bolster under knee: 30 reps; 5 second hold   Quad sets with half bolster under ankle: 15 reps; 10 second hold   +Hamstring stretch with heel prop: 45 seconds, 3 reps  Short arc quads with medium bolster: 3 sets of 10 reps; 5 second hold with 3lb AW  +Short arc quads with small bolster: 2 sets of 10 reps; 5 second hold with 3lb AW  Long arc quads: 3 sets of 10 reps; 5 second hold with 3lb AW  Heel slides: 10 second hold, 15 reps    Sit to stands from standard chair with airex: 10 reps - reports of lateral knee pain with sitting > standing  Step ups on 4-inch step: 1 set of 10 reps  Step ups on 6-inch step: 1 set of 10 reps - reports of lateral knee pain with full weightbearing on knee to ascend stairs    Straight leg raise with quad set : 3 x 5 reps - deferred today    Add next visit:  +HL Bridges  +SL Clamshells    MANUAL THERAPY TECHNIQUES including Joint mobilizations and Soft tissue Mobilization were applied to left knee for 10 minutes:   - Knee flexion PROM seated at EOM  - Grade II-III Patella Mobs    Patient received cold pack for 10 minutes to L knee with LE elevated for pain relief and swelling reduction.    Patient Education and Home Exercises     Home Exercises Provided and Patient Education Provided     Education provided:   - Continued compliance with HEP to improve strength and mobility    Written Home Exercises Provided: yes. Exercises were reviewed and Black was able to demonstrate them prior to the end of the session.  Black demonstrated good  understanding of the education provided. See EMR under Patient Instructions for exercises provided during therapy sessions    ASSESSMENT     Good tolerance to addition  of recumbent bike for active assistive ROM; he was unable to complete full revolutions, however had good tolerance to half revolutions for end range stretch. He reports pain and discomfort in his lateral knee with sit > stands and ascending stairs. Consider addition of HL bridges and SL clamshells next visit to improve hip and glute strength.  Black Is progressing well towards his goals.   Pt prognosis is Excellent.     Pt will continue to benefit from skilled outpatient physical therapy to address the deficits listed in the problem list box on initial evaluation, provide pt/family education and to maximize pt's level of independence in the home and community environment.   Pt's spiritual, cultural and educational needs considered and pt agreeable to plan of care and goals.     Anticipated barriers to physical therapy: None to note     Short Term Goals (3 Weeks):   1. Patient will be independent with home exercise program to supplement physical therapy treatment in improving functional status.  2. Patient will improve (L) lower extremity strength to at least 75% of (R) lower extremity strength as measured via MicroFet handheld dynamometer to improve strength for closed chain tasks.   3. Patient will improve (L) knee active range of motion to 0-90 degrees to promote increased ease of sit<>stand transfers.  4. Patient will perform timed up and go with less restrictive assistive device in < 20 seconds to improve gait speed and safety with community ambulation.     Long Term Goals (6 Weeks):   1. Patient will improve (L) lower extremity strength to at least 90% of (R) lower extremity strength as measured via MicroFet handheld dynamometer to improve strength for closed chain tasks.   2. Patient will improve the total FOTO Knee Survey Score to </= 30% limited to demonstrate increased perceived functional mobility.  3. Patient will improve score on KOOS Jr. section of FOTO Knee Survey to = </= 70% to demonstrate  increased perceived functional mobility.  4. Patient will perform timed up and go with least restrictive assistive device in < 13.5 seconds to improve gait speed and safety with community ambulation.  5. Patient will perform at least 17 sit to stands in 30 seconds without UE support to demonstrate increased functional strength.   6. Patient will improve (L) knee active range of motion to 0-120 degrees to promote higher level transfers and transitions without limitation.     PLAN     Improve active extension and flexion then progress to strengthening.     Manisha Chavarria, PTA

## 2022-04-01 NOTE — PROGRESS NOTES
Subjective:          Chief Complaint: Black Preciado is a 61 y.o. male who had concerns including Post-op Evaluation of the Left Knee.    HPI   Patient presents to clinic for 2 week postop evaluation of left knee.  Pain today is 4/10.  He has been attending physical therapy 3 days a week at the [facility] location.  He has been taking half of the 10 mg Percocet 2 to 3 times a day.  He has been taking aspirin 325 mg once daily.  Denies any nausea, vomiting, fever, chills, chest pain, shortness of breath.  He is ambulating today with a cane.  Doing well.    Date of Procedure: 3/18/2022   Procedure: Procedure(s) (LRB):  ARTHROPLASTY, KNEE (Left)      Surgeon(s) and Role:     * Estela Trevino MD - Primary     * Trev Delgado MD - Resident - Assisting  Pre-Operative Diagnosis: Primary osteoarthritis of left knee [M17.12]  Genu varum of both lower extremities [M21.161, M21.162]     Post-Operative Diagnosis: Post-Op Diagnosis Codes:     * Primary osteoarthritis of left knee [M17.12]     * Genu varum of both lower extremities [M21.161, M21.162]    Review of Systems   Constitutional: Negative. Negative for chills, fever, weight gain and weight loss.   HENT: Negative for congestion and sore throat.    Eyes: Negative for blurred vision and double vision.   Cardiovascular: Negative for chest pain, leg swelling and palpitations.   Respiratory: Negative for cough and shortness of breath.    Hematologic/Lymphatic: Does not bruise/bleed easily.   Skin: Negative for itching, poor wound healing and rash.   Musculoskeletal: Positive for joint pain, joint swelling and stiffness. Negative for back pain, muscle weakness and myalgias.   Gastrointestinal: Negative for abdominal pain, constipation, diarrhea, nausea and vomiting.   Genitourinary: Negative.  Negative for frequency and hematuria.   Neurological: Negative for dizziness, headaches, numbness, paresthesias and sensory change.   Psychiatric/Behavioral: Negative for  altered mental status and depression. The patient is not nervous/anxious.    Allergic/Immunologic: Negative for hives.                   Objective:        General: Black is well-developed, well-nourished, appears stated age, in no acute distress, alert and oriented to time, place and person.     General    Vitals reviewed.  Constitutional: He is oriented to person, place, and time. He appears well-developed and well-nourished. No distress.   Cardiovascular: Normal rate and regular rhythm.    Pulmonary/Chest: Effort normal. No respiratory distress.   Neurological: He is alert and oriented to person, place, and time.   Psychiatric: He has a normal mood and affect. His behavior is normal. Thought content normal.     General Musculoskeletal Exam   Gait: antalgic and abnormal         Left Knee Exam     Inspection   Erythema: absent  Scars: present  Swelling: present  Effusion: present  Deformity: absent  Bruising: absent    Tenderness   Left knee tenderness location: global tenderness.    Range of Motion   Extension: 0   Flexion: 90     Other   Sensation: normal    Comments:  Aquacel bandage removed. Incision healing well with no signs of infection or necrosis. No purulent drainage. NVI. Sutures tags trimmed.       Muscle Strength   Left Lower Extremity   Hip Abduction: 5/5   Quadriceps:  4/5   Hamstrin/5     RADIOGRAPHS:  Left knee:  FINDINGS:  Postoperative changes are now identified relating to a recent left knee arthroplasty procedure, prostheses appearing unremarkable.  No unusual postoperative findings or significant detrimental change since the immediate postoperative examination of 2022 appreciated.        Assessment:       Encounter Diagnosis   Name Primary?    S/P total knee replacement, left Yes          Plan:         1. Provided patient with operative note.  2. Evaluated incisions.  Suture tags trimmed.  No signs of infection.  3. May shower now without covering incisions.  4. Continue   mg once a day.  5. Continue PT per protocol.  6. RTC in 4 weeks with Dr. Estela Trevino for 6 week post op appt.  7. Refilled percocet 5mg . Continue to wean off pain medication                Patient questionnaires may have been collected.

## 2022-04-02 ENCOUNTER — PATIENT MESSAGE (OUTPATIENT)
Dept: FAMILY MEDICINE | Facility: CLINIC | Age: 62
End: 2022-04-02
Payer: MEDICARE

## 2022-04-02 DIAGNOSIS — R06.2 WHEEZING: Primary | ICD-10-CM

## 2022-04-04 ENCOUNTER — CLINICAL SUPPORT (OUTPATIENT)
Dept: REHABILITATION | Facility: HOSPITAL | Age: 62
End: 2022-04-04
Payer: MEDICARE

## 2022-04-04 ENCOUNTER — PATIENT MESSAGE (OUTPATIENT)
Dept: FAMILY MEDICINE | Facility: CLINIC | Age: 62
End: 2022-04-04
Payer: MEDICARE

## 2022-04-04 DIAGNOSIS — R26.9 GAIT ABNORMALITY: ICD-10-CM

## 2022-04-04 DIAGNOSIS — M25.662 KNEE STIFFNESS, LEFT: Primary | ICD-10-CM

## 2022-04-04 DIAGNOSIS — R29.898 WEAKNESS OF LEFT LOWER EXTREMITY: ICD-10-CM

## 2022-04-04 PROCEDURE — 97110 THERAPEUTIC EXERCISES: CPT

## 2022-04-04 NOTE — PROGRESS NOTES
[facility]   Physical Therapy Treatment Note     Name: Black Preciado  Clinic Number: [id number]    Therapy Diagnosis:   Encounter Diagnoses   Name Primary?    Knee stiffness, left Yes    Gait abnormality     Weakness of left lower extremity      Physician: Noman Jeffery, *    Visit Date: 2022    Physician Orders: PT Eval and Treat   Medical Diagnosis from Referral: M17.12 (ICD-10-CM) - Primary osteoarthritis of left knee   M21.162 (ICD-10-CM) - Acquired genu varum, left   M25.561,M25.562,G89.29 (ICD-10-CM) - Chronic pain of both knees  Evaluation Date: 3/21/2022  Authorization Period Expiration: 2022  Plan of Care Expiration: 2022  Visit # / Visits authorized:  + eval    PTA visit :      Time In: 0900  Time Out: 1000  Total Treatment Time: 60 minutes   Total Billable Time: 30 minutes (2 TE)     Precautions: Standard, hx of syncope     SUBJECTIVE     Patient reports: Pt states he was on his feet a lot this weekend which is contributing to increased soreness and swelling he is experiencing in his knee today.   Response to previous treatment: good, no adverse reaction; increased soreness after last session  Functional change: was able to go grocery shopping and hold on to the [employer]; ambulates around his home with no AD, denies knee instability or buckling    Pain: 5/10, currently  Location: Left knee    OBJECTIVE     Objective Measures updated at progress report unless specified.   DOS 3/18/2022  6 week follow up 2022    3/25/2022: L knee AAROM : 0 - 0 - 85 degrees AAROM   3/28/2022: Pt presents amb c/ SPC                    L knee AAROM : 0-0-86   3/30/2022: STS: 5 no AD                    TU seconds                      L knee : 0-0-86 AAROM  2022: 0-92 degrees AAROM with strap   Seated at EOM: 81 degrees of Flexion PROM    Treatment     Black received the treatments listed below:      Black received therapeutic exercises to develop  strength, endurance, ROM, flexibility and posture for 55 minutes including:    *BOLD = progression/addition of exercise    +Aerobic Activity for AAROM and to improve mobility for 5 minutes; half-revolutions, seat 14    Quad sets with half bolster under knee: 30 reps; 5 second hold   Quad sets with half bolster under ankle: 15 reps; 10 second hold   Hamstring stretch with heel prop: 45 seconds, 3 reps  Short arc quads with medium bolster: 3 sets of 10 reps; 5 second hold with 3lb AW  Short arc quads with large bolster: 2 sets of 10 reps; 5 second hold with 3lb AW  Long arc quads: 3 sets of 10 reps; 5 second hold with 3lb AW  Heel slides: 10 second hold, 15 reps    Sit to stands from standard chair with airex: 10 reps - reports of lateral knee pain with sitting > standing - NT  Step ups on 4-inch step: 1 set of 10 reps - NT  Step ups on 6-inch step: 1 set of 10 reps - reports of lateral knee pain with full weightbearing on knee to ascend stairs - NT    Straight leg raise with quad set : 3 x 5 reps - deferred today    Add next visit:  +HL Bridges  +SL Clamshells    MANUAL THERAPY TECHNIQUES including Joint mobilizations and Soft tissue Mobilization were applied to left knee for 10 minutes:   - Knee flexion PROM seated at EOM  - Grade II-III Patella Mobs    Patient received cold pack for 10 minutes to L knee with LE elevated for pain relief and swelling reduction.    Patient Education and Home Exercises     Home Exercises Provided and Patient Education Provided     Education provided:   - Continued compliance with HEP to improve strength and mobility    Written Home Exercises Provided: yes. Exercises were reviewed and Black was able to demonstrate them prior to the end of the session.  Black demonstrated good  understanding of the education provided. See EMR under Patient Instructions for exercises provided during therapy sessions    ASSESSMENT   Good tolerance to addition of recumbent bike for active assistive ROM;  he was unable to complete full revolutions, however had good tolerance to half revolutions for end range stretch. Moving slower with exercises today due to increased swelling from standing over the weekend. Pt lacking active extension due to swelling which should increase over time. Consider addition of HL bridges and SL clamshells next visit to improve hip and glute strength.    Black Is progressing well towards his goals.   Pt prognosis is Excellent.     Pt will continue to benefit from skilled outpatient physical therapy to address the deficits listed in the problem list box on initial evaluation, provide pt/family education and to maximize pt's level of independence in the home and community environment.   Pt's spiritual, cultural and educational needs considered and pt agreeable to plan of care and goals.     Anticipated barriers to physical therapy: None to note     Short Term Goals (3 Weeks):   1. Patient will be independent with home exercise program to supplement physical therapy treatment in improving functional status.  2. Patient will improve (L) lower extremity strength to at least 75% of (R) lower extremity strength as measured via MicroFet handheld dynamometer to improve strength for closed chain tasks.   3. Patient will improve (L) knee active range of motion to 0-90 degrees to promote increased ease of sit<>stand transfers.  4. Patient will perform timed up and go with less restrictive assistive device in < 20 seconds to improve gait speed and safety with community ambulation.     Long Term Goals (6 Weeks):   1. Patient will improve (L) lower extremity strength to at least 90% of (R) lower extremity strength as measured via MicroFet handheld dynamometer to improve strength for closed chain tasks.   2. Patient will improve the total FOTO Knee Survey Score to </= 30% limited to demonstrate increased perceived functional mobility.  3. Patient will improve score on KOOS Jr. section of FOTO Knee Survey  to = </= 70% to demonstrate increased perceived functional mobility.  4. Patient will perform timed up and go with least restrictive assistive device in < 13.5 seconds to improve gait speed and safety with community ambulation.  5. Patient will perform at least 17 sit to stands in 30 seconds without UE support to demonstrate increased functional strength.   6. Patient will improve (L) knee active range of motion to 0-120 degrees to promote higher level transfers and transitions without limitation.     PLAN     Improve active extension and flexion then progress to strengthening.     Angeles Marisa, PT, DPT

## 2022-04-05 ENCOUNTER — TELEPHONE (OUTPATIENT)
Dept: FAMILY MEDICINE | Facility: CLINIC | Age: 62
End: 2022-04-05
Payer: MEDICARE

## 2022-04-06 ENCOUNTER — CLINICAL SUPPORT (OUTPATIENT)
Dept: REHABILITATION | Facility: HOSPITAL | Age: 62
End: 2022-04-06
Payer: MEDICARE

## 2022-04-06 DIAGNOSIS — R29.898 WEAKNESS OF LEFT LOWER EXTREMITY: ICD-10-CM

## 2022-04-06 DIAGNOSIS — M25.662 KNEE STIFFNESS, LEFT: Primary | ICD-10-CM

## 2022-04-06 DIAGNOSIS — R26.9 GAIT ABNORMALITY: ICD-10-CM

## 2022-04-06 PROCEDURE — 97110 THERAPEUTIC EXERCISES: CPT

## 2022-04-06 NOTE — PROGRESS NOTES
[facility]   Physical Therapy Treatment Note     Name: Black Preciado  Clinic Number: [id number]    Therapy Diagnosis:   Encounter Diagnoses   Name Primary?    Knee stiffness, left Yes    Gait abnormality     Weakness of left lower extremity      Physician: Noman Jeffery, *    Visit Date: 2022    Physician Orders: PT Eval and Treat   Medical Diagnosis from Referral: M17.12 (ICD-10-CM) - Primary osteoarthritis of left knee   M21.162 (ICD-10-CM) - Acquired genu varum, left   M25.561,M25.562,G89.29 (ICD-10-CM) - Chronic pain of both knees  Evaluation Date: 3/21/2022  Authorization Period Expiration: 2022  Plan of Care Expiration: 2022  Visit # / Visits authorized:  + eval    PTA visit :      Time In: 0900  Time Out: 1000  Total Treatment Time: 60 minutes   Total Billable Time: 60 minutes     Precautions: Standard, hx of syncope     SUBJECTIVE     Patient reports: Pt with complaints of sharp/shooting pain that he has been experiencing intermittently. States his knee is really sore today and he exercised a lot yesterday, working on bending his knee to 90 degrees  Response to previous treatment: good, no adverse reaction; increased soreness after last session  Functional change: none to note; continuing to ambulate with straight cane     Pain: 6/10, currently  Location: Left knee    OBJECTIVE     Objective Measures updated at progress report unless specified.   DOS 3/18/2022  6 week follow up 2022    3/25/2022: L knee AAROM : 0 - 0 - 85 degrees AAROM   3/28/2022: Pt presents amb c/ SPC                    L knee AAROM : 0-0-86   3/30/2022: STS: 5 no AD                    TU seconds                      L knee : 0-0-86 AAROM  2022: 0-92 degrees AAROM with strap   Seated at EOM: 81 degrees of Flexion PROM    Treatment     Black received the treatments listed below:      Black received therapeutic exercises to develop strength, endurance, ROM,  flexibility and posture for 55 minutes including:    *BOLD = progression/addition of exercise    - Aerobic Activity for AAROM and to improve mobility for 8 minutes; half-revolutions, seat 14    Quad sets with half bolster under knee: 30 reps; 5 second hold   Quad sets with half bolster under ankle: 15 reps; 10 second hold   Hamstring stretch with heel prop: 30 seconds, 5 reps  Short arc quads with medium bolster: 3 sets of 10 reps; 5 second hold with 4lb AW  Long arc quads: 3 sets of 10 reps; 5 second hold with 4lb AW  HL bridges w/ GTB; 1 x 10 reps; 3 sec hold   SL clamshells; 3 x 10 reps; 3 reps   Heel slides: 10 second hold, 15 reps    Sit to stands from standard chair with airex: 10 reps - reports of lateral knee pain with sitting > standing - NT  Step ups on 4-inch step: 1 set of 10 reps - NT  Step ups on 6-inch step: 1 set of 10 reps - reports of lateral knee pain with full weightbearing on knee to ascend stairs - NT    Straight leg raise with quad set : 3 x 5 reps - deferred today      MANUAL THERAPY TECHNIQUES including Joint mobilizations and Soft tissue Mobilization were applied to left knee for 10 minutes:   - Knee flexion PROM seated at EOM  - Grade II-III Patella Mobs    Patient received cold pack for 10 minutes to L knee with LE elevated for pain relief and swelling reduction.    Patient Education and Home Exercises     Home Exercises Provided and Patient Education Provided     Education provided:   - Continued compliance with HEP to improve strength and mobility    Written Home Exercises Provided: yes. Exercises were reviewed and Black was able to demonstrate them prior to the end of the session.  Black demonstrated good  understanding of the education provided. See EMR under Patient Instructions for exercises provided during therapy sessions    ASSESSMENT   *Banaged removed from incision today by physical therapist; wound closed with no drainage or erythema. Good tolerance to addition of  recumbent bike for active assistive ROM; he was unable to complete full revolutions, however had good tolerance to half revolutions for end range stretch. Moving slower with exercises today due to increased swelling from standing over the weekend. Pt lacking active extension due to swelling which should increase over time. Pt unable to complete full 1 x 10 of HL bridges without break after every 2 reps performed. Pt with marked complaints with each rep due to muscle soreness.     Black Is progressing well towards his goals.   Pt prognosis is Excellent.     Pt will continue to benefit from skilled outpatient physical therapy to address the deficits listed in the problem list box on initial evaluation, provide pt/family education and to maximize pt's level of independence in the home and community environment.   Pt's spiritual, cultural and educational needs considered and pt agreeable to plan of care and goals.     Anticipated barriers to physical therapy: None to note     Short Term Goals (3 Weeks):   1. Patient will be independent with home exercise program to supplement physical therapy treatment in improving functional status.  2. Patient will improve (L) lower extremity strength to at least 75% of (R) lower extremity strength as measured via MicroFet handheld dynamometer to improve strength for closed chain tasks.   3. Patient will improve (L) knee active range of motion to 0-90 degrees to promote increased ease of sit<>stand transfers.  4. Patient will perform timed up and go with less restrictive assistive device in < 20 seconds to improve gait speed and safety with community ambulation.     Long Term Goals (6 Weeks):   1. Patient will improve (L) lower extremity strength to at least 90% of (R) lower extremity strength as measured via MicroFet handheld dynamometer to improve strength for closed chain tasks.   2. Patient will improve the total FOTO Knee Survey Score to </= 30% limited to demonstrate increased  perceived functional mobility.  3. Patient will improve score on KOOS Jr. section of FOTO Knee Survey to = </= 70% to demonstrate increased perceived functional mobility.  4. Patient will perform timed up and go with least restrictive assistive device in < 13.5 seconds to improve gait speed and safety with community ambulation.  5. Patient will perform at least 17 sit to stands in 30 seconds without UE support to demonstrate increased functional strength.   6. Patient will improve (L) knee active range of motion to 0-120 degrees to promote higher level transfers and transitions without limitation.     PLAN   Improve active extension and flexion then progress to strengthening.     Angeles Marisa, PT, DPT

## 2022-04-08 ENCOUNTER — CLINICAL SUPPORT (OUTPATIENT)
Dept: REHABILITATION | Facility: HOSPITAL | Age: 62
End: 2022-04-08
Payer: MEDICARE

## 2022-04-08 DIAGNOSIS — R26.9 GAIT ABNORMALITY: ICD-10-CM

## 2022-04-08 DIAGNOSIS — R29.898 WEAKNESS OF LEFT LOWER EXTREMITY: ICD-10-CM

## 2022-04-08 DIAGNOSIS — M25.662 KNEE STIFFNESS, LEFT: Primary | ICD-10-CM

## 2022-04-08 PROCEDURE — 97110 THERAPEUTIC EXERCISES: CPT

## 2022-04-08 NOTE — PROGRESS NOTES
[facility]   Physical Therapy Treatment Note     Name: lBack Preciado  Clinic Number: [id number]    Therapy Diagnosis:   Encounter Diagnoses   Name Primary?    Knee stiffness, left Yes    Gait abnormality     Weakness of left lower extremity      Physician: Noman Jeffery, *    Visit Date: 2022    Physician Orders: PT Eval and Treat   Medical Diagnosis from Referral: M17.12 (ICD-10-CM) - Primary osteoarthritis of left knee   M21.162 (ICD-10-CM) - Acquired genu varum, left   M25.561,M25.562,G89.29 (ICD-10-CM) - Chronic pain of both knees  Evaluation Date: 3/21/2022  Authorization Period Expiration: 2022  Plan of Care Expiration: 2022  Visit # / Visits authorized:  + eval    PTA visit : 0      Time In: 0900  Time Out: 1000  Total Treatment Time: 60 minutes   Total Billable Time: 30 minutes     Precautions: Standard, hx of syncope     SUBJECTIVE     Patient reports: Pt experiencing increased soreness today, states he thinks he may have overdone it yesterday since he had a [employer] service at his home. Pt motivated to improve symptoms and get stronger.   Response to previous treatment: good, no adverse reaction; increased soreness after last session  Functional change: none to note; continuing to ambulate with straight cane     Pain: 6/10, currently  Location: Left knee    OBJECTIVE     Objective Measures updated at progress report unless specified.   DOS 3/18/2022  6 week follow up 2022    3/25/2022: L knee AAROM : 0 - 0 - 85 degrees AAROM   3/28/2022: Pt presents amb c/ SPC                    L knee AAROM : 0-0-86   3/30/2022: STS: 5 no AD                    TU seconds                      L knee : 0-0-86 AAROM  2022: 0-92 degrees AAROM with strap   Seated at EOM: 81 degrees of Flexion AROM  2022: 0-105 degrees AAROM without strap   Treatment     Black received the treatments listed below:      Black received therapeutic exercises to  develop strength, endurance, ROM, flexibility and posture for 55 minutes including:    *BOLD = progression/addition of exercise    - Aerobic Activity for AAROM and to improve mobility for 8 minutes; half-revolutions, seat 14    Quad sets with half bolster under knee: 30 reps; 5 second hold   Quad sets with half bolster under ankle: 15 reps; 10 second hold   Hamstring stretch with heel prop: 30 seconds, 5 reps  Short arc quads with large bolster; 3 sets of 10 reps; 5 second hold (no AW, increased swelling today)   Short arc quads with medium bolster: 3 sets of 10 reps; 5 second hold with 0lb AW (increased swelling today)   Long arc quads: 3 sets of 10 reps; 5 second hold with 0lb AW (increased swelling)   Heel slides: 10 second hold, 15 reps  HL bridges w/ GTB; 1 x 10 reps; 3 sec hold     SL clamshells; 3 x 10 reps; 3 reps - NT  Sit to stands from standard chair with airex: 10 reps - reports of lateral knee pain with sitting > standing - NT  Step ups on 4-inch step: 1 set of 10 reps - NT  Step ups on 6-inch step: 1 set of 10 reps - reports of lateral knee pain with full weightbearing on knee to ascend stairs - NT    Straight leg raise with quad set : 3 x 5 reps - deferred today      MANUAL THERAPY TECHNIQUES including Joint mobilizations and Soft tissue Mobilization were applied to left knee for 10 minutes:   - Knee flexion PROM seated at EOM  - Grade II-III Patella Mobs    Patient received cold pack for 10 minutes to L knee with LE elevated for pain relief and swelling reduction.    Patient Education and Home Exercises     Home Exercises Provided and Patient Education Provided     Education provided:   - Continued compliance with HEP to improve strength and mobility  - Decrease static standing activity over the weekend to decrease swelling; continue to wear kim hose throughout the day    Written Home Exercises Provided: yes. Exercises were reviewed and Black was able to demonstrate them prior to the end of the  session.  Black demonstrated good  understanding of the education provided. See EMR under Patient Instructions for exercises provided during therapy sessions    ASSESSMENT   Marked swelling in left lower extremity today due to increase activity yesterday. Moving slower with exercises today due to increased swelling from standing along with increased pain with all exercises; requiring frequent rest breaks. Pt lacking active extension due to swelling which should increase over time. Weight and reps performed were decreased today due to increased swelling and pain. Pt with marked complaints with each rep due to muscle soreness.     Black Is progressing well towards his goals.   Pt prognosis is Excellent.     Pt will continue to benefit from skilled outpatient physical therapy to address the deficits listed in the problem list box on initial evaluation, provide pt/family education and to maximize pt's level of independence in the home and community environment.   Pt's spiritual, cultural and educational needs considered and pt agreeable to plan of care and goals.     Anticipated barriers to physical therapy: None to note     Short Term Goals (3 Weeks):   1. Patient will be independent with home exercise program to supplement physical therapy treatment in improving functional status.  2. Patient will improve (L) lower extremity strength to at least 75% of (R) lower extremity strength as measured via MicroFet handheld dynamometer to improve strength for closed chain tasks.   3. Patient will improve (L) knee active range of motion to 0-90 degrees to promote increased ease of sit<>stand transfers.  4. Patient will perform timed up and go with less restrictive assistive device in < 20 seconds to improve gait speed and safety with community ambulation.     Long Term Goals (6 Weeks):   1. Patient will improve (L) lower extremity strength to at least 90% of (R) lower extremity strength as measured via MicroFet handheld  dynamometer to improve strength for closed chain tasks.   2. Patient will improve the total FOTO Knee Survey Score to </= 30% limited to demonstrate increased perceived functional mobility.  3. Patient will improve score on KOOS Jr. section of FOTO Knee Survey to = </= 70% to demonstrate increased perceived functional mobility.  4. Patient will perform timed up and go with least restrictive assistive device in < 13.5 seconds to improve gait speed and safety with community ambulation.  5. Patient will perform at least 17 sit to stands in 30 seconds without UE support to demonstrate increased functional strength.   6. Patient will improve (L) knee active range of motion to 0-120 degrees to promote higher level transfers and transitions without limitation.     PLAN   Improve active extension and flexion then progress to strengthening.     Angeles Marisa, PT, DPT

## 2022-04-11 ENCOUNTER — CLINICAL SUPPORT (OUTPATIENT)
Dept: REHABILITATION | Facility: HOSPITAL | Age: 62
End: 2022-04-11
Payer: MEDICARE

## 2022-04-11 DIAGNOSIS — M25.662 KNEE STIFFNESS, LEFT: Primary | ICD-10-CM

## 2022-04-11 DIAGNOSIS — R29.898 WEAKNESS OF LEFT LOWER EXTREMITY: ICD-10-CM

## 2022-04-11 DIAGNOSIS — R26.9 GAIT ABNORMALITY: ICD-10-CM

## 2022-04-11 PROCEDURE — 97110 THERAPEUTIC EXERCISES: CPT

## 2022-04-11 NOTE — PROGRESS NOTES
[facility]   Physical Therapy Treatment Note     Name: Black Preciado  Clinic Number: [id number]    Therapy Diagnosis:   Encounter Diagnoses   Name Primary?    Knee stiffness, left Yes    Gait abnormality     Weakness of left lower extremity      Physician: Noman Jeffery, *    Visit Date: 2022    Physician Orders: PT Eval and Treat   Medical Diagnosis from Referral: M17.12 (ICD-10-CM) - Primary osteoarthritis of left knee   M21.162 (ICD-10-CM) - Acquired genu varum, left   M25.561,M25.562,G89.29 (ICD-10-CM) - Chronic pain of both knees  Evaluation Date: 3/21/2022  Authorization Period Expiration: 2022  Plan of Care Expiration: 2022  Visit # / Visits authorized:  + eval    PTA visit : 0      Time In: 0900  Time Out: 1000  Total Treatment Time: 60 minutes   Total Billable Time: 30 minutes (2 TE)      Precautions: Standard, hx of syncope     SUBJECTIVE     Patient reports: Pt states he was unable to rest over the weekend; has increased swelling from increased activity over the weekend. Experiencing tenderness in posterior knee capsule.  Response to previous treatment: good, no adverse reaction; increased soreness after last session  Functional change: none to note; continuing to ambulate with straight cane     Pain: 6/10, currently  Location: Left knee    OBJECTIVE     Objective Measures updated at progress report unless specified.   DOS 3/18/2022  6 week follow up 2022    3/25/2022: L knee AAROM : 0 - 0 - 85 degrees AAROM   3/28/2022: Pt presents amb c/ SPC                    L knee AAROM : 0-0-86   3/30/2022: STS: 5 no AD                    TU seconds                      L knee : 0-0-86 AAROM  2022: 0-92 degrees AAROM with strap   Seated at EOM: 81 degrees of Flexion AROM  2022: 0-105 degrees AAROM without strap   Treatment     Black received the treatments listed below:      Black received therapeutic exercises to develop strength,  endurance, ROM, flexibility and posture for 55 minutes including:    *BOLD = progression/addition of exercise    - Aerobic Activity for AAROM and to improve mobility for 10 minutes; half-revolutions, seat 14    Quad sets with half bolster under knee: 30 reps; 5 second hold   Hamstring stretch with heel prop: 30 seconds, 5 reps  Short arc quads with large bolster; 3 sets of 10 reps; 5 second hold; 5 lb AW   Long arc quads: 3 sets of 10 reps; 5 second hold; 5lb AW   Heel slides: 10 second hold, 15 reps  HL bridges w/ GTB; 2 x 10 reps; 3 sec hold   SL clamshells; 3 x 10 reps; 3 reps   Sit to stands from standard chair with 1 airex: 2 x 10 reps     Step ups on 4-inch step: 1 set of 10 reps - NT  Step ups on 6-inch step: 1 set of 10 reps - reports of lateral knee pain with full weightbearing on knee to ascend stairs - NT  Straight leg raise with quad set : 3 x 5 reps - deferred today      MANUAL THERAPY TECHNIQUES including Joint mobilizations and Soft tissue Mobilization were applied to left knee for 00 minutes:   - Knee flexion PROM seated at EOM  - Grade II-III Patella Mobs    Patient received cold pack for 10 minutes to L knee with LE elevated for pain relief and swelling reduction.    Patient Education and Home Exercises     Home Exercises Provided and Patient Education Provided     Education provided:   - Continued compliance with HEP to improve strength and mobility  - Decrease static standing activity over the weekend to decrease swelling; continue to wear kim hose throughout the day    Written Home Exercises Provided: yes. Exercises were reviewed and Black was able to demonstrate them prior to the end of the session.  Black demonstrated good  understanding of the education provided. See EMR under Patient Instructions for exercises provided during therapy sessions    ASSESSMENT   Marked swelling in left lower extremity today due to increase activity over the weekend. Moving slower with exercises today due to  increased swelling from standing but pt able to complete all exercises with added weight today. Pt requiring frequent rest breaks but able to complete 2 x 10 reps sit to stand with minimal verbal cueing after hip strengthening. Continue to progress towards functional strengthening in accordance with swelling.     Black Is progressing well towards his goals.   Pt prognosis is Excellent.     Pt will continue to benefit from skilled outpatient physical therapy to address the deficits listed in the problem list box on initial evaluation, provide pt/family education and to maximize pt's level of independence in the home and community environment.   Pt's spiritual, cultural and educational needs considered and pt agreeable to plan of care and goals.     Anticipated barriers to physical therapy: None to note     Short Term Goals (3 Weeks):   1. Patient will be independent with home exercise program to supplement physical therapy treatment in improving functional status.  2. Patient will improve (L) lower extremity strength to at least 75% of (R) lower extremity strength as measured via MicroFet handheld dynamometer to improve strength for closed chain tasks.   3. Patient will improve (L) knee active range of motion to 0-90 degrees to promote increased ease of sit<>stand transfers.  4. Patient will perform timed up and go with less restrictive assistive device in < 20 seconds to improve gait speed and safety with community ambulation.     Long Term Goals (6 Weeks):   1. Patient will improve (L) lower extremity strength to at least 90% of (R) lower extremity strength as measured via MicroFet handheld dynamometer to improve strength for closed chain tasks.   2. Patient will improve the total FOTO Knee Survey Score to </= 30% limited to demonstrate increased perceived functional mobility.  3. Patient will improve score on KOOS Jr. section of FOTO Knee Survey to = </= 70% to demonstrate increased perceived functional  mobility.  4. Patient will perform timed up and go with least restrictive assistive device in < 13.5 seconds to improve gait speed and safety with community ambulation.  5. Patient will perform at least 17 sit to stands in 30 seconds without UE support to demonstrate increased functional strength.   6. Patient will improve (L) knee active range of motion to 0-120 degrees to promote higher level transfers and transitions without limitation.     PLAN   Improve quad/hip strengthening with standing activities.     Angeles Marisa, PT, DPT

## 2022-04-13 ENCOUNTER — CLINICAL SUPPORT (OUTPATIENT)
Dept: REHABILITATION | Facility: HOSPITAL | Age: 62
End: 2022-04-13
Payer: MEDICARE

## 2022-04-13 DIAGNOSIS — R26.9 GAIT ABNORMALITY: ICD-10-CM

## 2022-04-13 DIAGNOSIS — R29.898 WEAKNESS OF LOWER EXTREMITY, UNSPECIFIED LATERALITY: ICD-10-CM

## 2022-04-13 DIAGNOSIS — M25.662 KNEE STIFFNESS, LEFT: Primary | ICD-10-CM

## 2022-04-13 PROCEDURE — 97140 MANUAL THERAPY 1/> REGIONS: CPT

## 2022-04-13 PROCEDURE — 97110 THERAPEUTIC EXERCISES: CPT

## 2022-04-13 NOTE — PROGRESS NOTES
[facility]   Physical Therapy Treatment Note     Name: Black Preciado  Clinic Number: [id number]    Therapy Diagnosis:   Encounter Diagnoses   Name Primary?    Knee stiffness, left Yes    Gait abnormality     Weakness of lower extremity, unspecified laterality      Physician: Noman Jeffery, *    Visit Date: 2022    Physician Orders: PT Eval and Treat   Medical Diagnosis from Referral: M17.12 (ICD-10-CM) - Primary osteoarthritis of left knee   M21.162 (ICD-10-CM) - Acquired genu varum, left   M25.561,M25.562,G89.29 (ICD-10-CM) - Chronic pain of both knees  Evaluation Date: 3/21/2022  Authorization Period Expiration: 2022  Plan of Care Expiration: 2022  Visit # / Visits authorized:  + eval    PTA visit :      Time In: 10:00 am  Time Out: 11:00 am   Total Treatment Time: 50 minutes ( 10' untimed CP)  Total Billable Time: 30 minutes (2 TE)      Precautions: Standard, hx of syncope     SUBJECTIVE     Patient reports: he is in a lot of pain today and was near tears trying to get his shoes on.   Response to previous treatment: very sore   Functional change: none to note; continuing to ambulate with straight cane     Pain: 6/10, currently  Location: Left knee    OBJECTIVE     Objective Measures updated at progress report unless specified.   DOS 3/18/2022  6 week follow up 2022    3/25/2022: L knee AAROM : 0 - 0 - 85 degrees AAROM   3/28/2022: Pt presents amb c/ SPC                    L knee AAROM : 0-0-86   3/30/2022: STS: 5 no AD                    TU seconds                      L knee : 0-0-86 AAROM  2022: 0-92 degrees AAROM with strap   Seated at EOM: 81 degrees of Flexion AROM  2022: 0-105 degrees AAROM without strap   2022: 0-110 (115)  Treatment     Black received the treatments listed below:      Black received therapeutic exercises to develop strength, endurance, ROM, flexibility and posture for 40 minutes  including:    *BOLD = progression/addition of exercise    - Aerobic Activity for AAROM and to improve mobility for 10 minutes; half-revolutions, seat 14  Quad sets: 10 seconds, 15x  Heel prop: 3 minutes   Short arc quad: small bolster 10 seconds, 20x, 3lbs   Long arc quads: 7.5 lbs, 10 seconds, 20x, 7.5 lbs  Sit to stands with red theraband around knees: 2x12- used mirror to encourage equal weightbearing- NP      MANUAL THERAPY TECHNIQUES including Joint mobilizations and Soft tissue Mobilization were applied to left knee for 10 minutes:   - Knee flexion PROM seated at EOM + contract relax  - Grade II-III Patella Mobs  -distraction + IR + PROM flexion    Patient received cold pack for 10 minutes to L knee with LE elevated for pain relief and swelling reduction.    Patient Education and Home Exercises     Home Exercises Provided and Patient Education Provided     Education provided:   - HEP compliance and diligence with HEP  - make time to rest and elevate to decrease swelling    Written Home Exercises Provided: yes. Exercises were reviewed and Black was able to demonstrate them prior to the end of the session.  Black demonstrated good  understanding of the education provided. See EMR under Patient Instructions for exercises provided during therapy sessions    ASSESSMENT   Patient is 3 weeks and 6 days s/p L total knee arthroplasty. Pt presents with increased pain/soreness and poor tolerance to there-ex performance limiting ability to progress with closed chain strengthening today.  Pt exhibits 0 degrees of extension and good quadriceps activation although is quick to fatigue with exercises performed. Will continue to progress with strengthening and closed chain activities next .     Black Is progressing well towards his goals.   Pt prognosis is Excellent.     Pt will continue to benefit from skilled outpatient physical therapy to address the deficits listed in the problem list box on initial evaluation,  provide pt/family education and to maximize pt's level of independence in the home and community environment.   Pt's spiritual, cultural and educational needs considered and pt agreeable to plan of care and goals.     Anticipated barriers to physical therapy: None to note     Short Term Goals (3 Weeks):   1. Patient will be independent with home exercise program to supplement physical therapy treatment in improving functional status.  2. Patient will improve (L) lower extremity strength to at least 75% of (R) lower extremity strength as measured via MicroFet handheld dynamometer to improve strength for closed chain tasks.   3. Patient will improve (L) knee active range of motion to 0-90 degrees to promote increased ease of sit<>stand transfers.  4. Patient will perform timed up and go with less restrictive assistive device in < 20 seconds to improve gait speed and safety with community ambulation.     Long Term Goals (6 Weeks):   1. Patient will improve (L) lower extremity strength to at least 90% of (R) lower extremity strength as measured via MicroFet handheld dynamometer to improve strength for closed chain tasks.   2. Patient will improve the total FOTO Knee Survey Score to </= 30% limited to demonstrate increased perceived functional mobility.  3. Patient will improve score on KOOS Jr. section of FOTO Knee Survey to = </= 70% to demonstrate increased perceived functional mobility.  4. Patient will perform timed up and go with least restrictive assistive device in < 13.5 seconds to improve gait speed and safety with community ambulation.  5. Patient will perform at least 17 sit to stands in 30 seconds without UE support to demonstrate increased functional strength.   6. Patient will improve (L) knee active range of motion to 0-120 degrees to promote higher level transfers and transitions without limitation.     PLAN   Improve quad/hip strengthening with standing activities.     Melissa Nix, PTA

## 2022-04-13 NOTE — PROGRESS NOTES
[facility]   Physical Therapy Treatment Note     Name: Black Preciado  Clinic Number: [id number]    Therapy Diagnosis:   Encounter Diagnoses   Name Primary?    Knee stiffness, left Yes    Gait abnormality     Weakness of lower extremity, unspecified laterality      Physician: Noman Jeffery, *    Visit Date: 2022    Physician Orders: PT Eval and Treat   Medical Diagnosis from Referral: M17.12 (ICD-10-CM) - Primary osteoarthritis of left knee   M21.162 (ICD-10-CM) - Acquired genu varum, left   M25.561,M25.562,G89.29 (ICD-10-CM) - Chronic pain of both knees  Evaluation Date: 3/21/2022  Authorization Period Expiration: 2022  Plan of Care Expiration: 2022  Visit # / Visits authorized: 10 / 25 + eval    PTA visit : 0      Time In: 9:00 am  Time Out: 10:10  Total Treatment Time: 70 minutes   Total Billable Time: 30 minutes (2 TE)      Precautions: Standard, hx of syncope     SUBJECTIVE     Patient reports: continuation of swelling. Patient and PT discussed diligence with HEP and making time to rest and elevate his knee to reduce swelling.   Response to previous treatment: good, no adverse reaction; increased soreness after last session  Functional change: none to note; continuing to ambulate with straight cane     Pain: 610, currently  Location: Left knee    OBJECTIVE     Objective Measures updated at progress report unless specified.   DOS 3/18/2022  6 week follow up 2022    3/25/2022: L knee AAROM : 0 - 0 - 85 degrees AAROM   3/28/2022: Pt presents amb c/ SPC                    L knee AAROM : 0-0-86   3/30/2022: STS: 5 no AD                    TU seconds                      L knee : 0-0-86 AAROM  2022: 0-92 degrees AAROM with strap   Seated at EOM: 81 degrees of Flexion AROM  2022: 0-105 degrees AAROM without strap   2022: 0-110 (115)  Treatment     Black received the treatments listed below:      Black received therapeutic exercises to  develop strength, endurance, ROM, flexibility and posture for 35 minutes including:    *BOLD = progression/addition of exercise    - Aerobic Activity for AAROM and to improve mobility for 10 minutes; half-revolutions, seat 14  Quad sets: 10 seconds, 15x  Heel prop: 3 minutes   Short arc quad: small bolster 10 seconds, 20x, 3lbs   Long arc quads: 7.5 lbs, 10 seconds, 20x, 7.5 lbs  Sit to stands with red theraband around knees: 2x12- used mirror to encourage equal weightbearing      MANUAL THERAPY TECHNIQUES including Joint mobilizations and Soft tissue Mobilization were applied to left knee for 25 minutes:   - Knee flexion PROM seated at EOM + contract relax  - Grade II-III Patella Mobs  -distraction + IR + PROM flexion    Patient received cold pack for 10 minutes to L knee with LE elevated for pain relief and swelling reduction.    Patient Education and Home Exercises     Home Exercises Provided and Patient Education Provided     Education provided:   - HEP compliance and diligence with HEP  - make time to rest and elevate to decrease swelling    Written Home Exercises Provided: yes. Exercises were reviewed and Black was able to demonstrate them prior to the end of the session.  Black demonstrated good  understanding of the education provided. See EMR under Patient Instructions for exercises provided during therapy sessions    ASSESSMENT   Patient is 3 weeks and 5 days s/p L total knee arthroplasty. Patient demonstrates 0-110 degrees of active left knee range of motion; 115 degrees passive range of motion. Patient with good quadriceps strength at this time, however demonstrates motor control deficits due to early fatigue with 10 second holds during short arc and long arc quads. Significant time spent with sit to stands; patient able to tolerate sit to stands from standard chair height with proper cues. Observable decrease in swelling with quad focused exercises.    Black Is progressing well towards his goals.    Pt prognosis is Excellent.     Pt will continue to benefit from skilled outpatient physical therapy to address the deficits listed in the problem list box on initial evaluation, provide pt/family education and to maximize pt's level of independence in the home and community environment.   Pt's spiritual, cultural and educational needs considered and pt agreeable to plan of care and goals.     Anticipated barriers to physical therapy: None to note     Short Term Goals (3 Weeks):   1. Patient will be independent with home exercise program to supplement physical therapy treatment in improving functional status.  2. Patient will improve (L) lower extremity strength to at least 75% of (R) lower extremity strength as measured via MicroFet handheld dynamometer to improve strength for closed chain tasks.   3. Patient will improve (L) knee active range of motion to 0-90 degrees to promote increased ease of sit<>stand transfers.  4. Patient will perform timed up and go with less restrictive assistive device in < 20 seconds to improve gait speed and safety with community ambulation.     Long Term Goals (6 Weeks):   1. Patient will improve (L) lower extremity strength to at least 90% of (R) lower extremity strength as measured via MicroFet handheld dynamometer to improve strength for closed chain tasks.   2. Patient will improve the total FOTO Knee Survey Score to </= 30% limited to demonstrate increased perceived functional mobility.  3. Patient will improve score on KOOS Jr. section of FOTO Knee Survey to = </= 70% to demonstrate increased perceived functional mobility.  4. Patient will perform timed up and go with least restrictive assistive device in < 13.5 seconds to improve gait speed and safety with community ambulation.  5. Patient will perform at least 17 sit to stands in 30 seconds without UE support to demonstrate increased functional strength.   6. Patient will improve (L) knee active range of motion to 0-120  degrees to promote higher level transfers and transitions without limitation.     PLAN   Improve quad/hip strengthening with standing activities.     Comfort Cordon, PT, DPT

## 2022-04-14 ENCOUNTER — PATIENT MESSAGE (OUTPATIENT)
Dept: SPORTS MEDICINE | Facility: CLINIC | Age: 62
End: 2022-04-14
Payer: MEDICARE

## 2022-04-14 ENCOUNTER — CLINICAL SUPPORT (OUTPATIENT)
Dept: REHABILITATION | Facility: HOSPITAL | Age: 62
End: 2022-04-14
Payer: MEDICARE

## 2022-04-14 DIAGNOSIS — R26.9 GAIT ABNORMALITY: ICD-10-CM

## 2022-04-14 DIAGNOSIS — M25.662 KNEE STIFFNESS, LEFT: Primary | ICD-10-CM

## 2022-04-14 DIAGNOSIS — Z96.652 S/P TOTAL KNEE REPLACEMENT, LEFT: Primary | ICD-10-CM

## 2022-04-14 DIAGNOSIS — R29.898 WEAKNESS OF LOWER EXTREMITY, UNSPECIFIED LATERALITY: ICD-10-CM

## 2022-04-14 PROCEDURE — 97140 MANUAL THERAPY 1/> REGIONS: CPT | Mod: CQ

## 2022-04-14 PROCEDURE — 97110 THERAPEUTIC EXERCISES: CPT | Mod: CQ

## 2022-04-14 RX ORDER — OXYCODONE AND ACETAMINOPHEN 5; 325 MG/1; MG/1
1 TABLET ORAL EVERY 8 HOURS PRN
Qty: 21 TABLET | Refills: 0 | Status: SHIPPED | OUTPATIENT
Start: 2022-04-14 | End: 2023-03-09

## 2022-04-18 ENCOUNTER — PATIENT MESSAGE (OUTPATIENT)
Dept: FAMILY MEDICINE | Facility: CLINIC | Age: 62
End: 2022-04-18
Payer: MEDICARE

## 2022-04-18 ENCOUNTER — TELEPHONE (OUTPATIENT)
Dept: FAMILY MEDICINE | Facility: CLINIC | Age: 62
End: 2022-04-18
Payer: MEDICARE

## 2022-04-18 ENCOUNTER — CLINICAL SUPPORT (OUTPATIENT)
Dept: REHABILITATION | Facility: HOSPITAL | Age: 62
End: 2022-04-18
Payer: MEDICARE

## 2022-04-18 DIAGNOSIS — R26.9 GAIT ABNORMALITY: ICD-10-CM

## 2022-04-18 DIAGNOSIS — F41.9 ANXIETY: Primary | ICD-10-CM

## 2022-04-18 DIAGNOSIS — R29.898 WEAKNESS OF LOWER EXTREMITY, UNSPECIFIED LATERALITY: ICD-10-CM

## 2022-04-18 DIAGNOSIS — M25.662 KNEE STIFFNESS, LEFT: Primary | ICD-10-CM

## 2022-04-18 PROCEDURE — 97110 THERAPEUTIC EXERCISES: CPT | Mod: CQ

## 2022-04-18 PROCEDURE — 97140 MANUAL THERAPY 1/> REGIONS: CPT | Mod: CQ

## 2022-04-18 RX ORDER — HYDROXYZINE PAMOATE 25 MG/1
25 CAPSULE ORAL 2 TIMES DAILY PRN
Qty: 60 CAPSULE | Refills: 2 | Status: SHIPPED | OUTPATIENT
Start: 2022-04-18 | End: 2022-09-15 | Stop reason: SDUPTHER

## 2022-04-18 NOTE — PROGRESS NOTES
[facility]   Physical Therapy Treatment Note     Name: Black Preciado  [facility] Number: [id number]    Therapy Diagnosis:   Encounter Diagnoses   Name Primary?    Knee stiffness, left Yes    Gait abnormality     Weakness of lower extremity, unspecified laterality      Physician: Noman Jeffery, *    Visit Date: 2022    Physician Orders: PT Eval and Treat   Medical Diagnosis from Referral: M17.12 (ICD-10-CM) - Primary osteoarthritis of left knee   M21.162 (ICD-10-CM) - Acquired genu varum, left   M25.561,M25.562,G89.29 (ICD-10-CM) - Chronic pain of both knees  Evaluation Date: 3/21/2022  Authorization Period Expiration: 22  Plan of Care Expiration: 2022  Visit # / Visits authorized:  + eval    PTA visit :      Time In: 10:00 am  Time Out: 11:00 am   Total Treatment Time: 70 minutes   Total Billable Time: 60 minutes (4 TE)      Precautions: Standard, hx of syncope     SUBJECTIVE     Patient reports: he is not doing well , he barely slept and is really tired because he has been dealing with a lot of anxiety. Pt stated he contacted his doctor and someone should be calling him today . Pt stated he knows we told him to take it easy and rest this weekend but he wasn't able to and he did a lot this weekend so he is having a lot of pain and swelling.   Response to previous treatment: very sore   Functional change: none to note; continuing to ambulate with straight cane     Pain: 5/10, currently  Location: Left knee    OBJECTIVE     Objective Measures updated at progress report unless specified.   DOS 3/18/2022  6 week follow up 2022    3/25/2022: L knee AAROM : 0 - 0 - 85 degrees AAROM   3/28/2022: Pt presents amb c/ SPC                    L knee AAROM : 0-0-86   3/30/2022: STS: 5 no AD                    TU seconds                      L knee : 0-0-86 AAROM  2022: 0-92 degrees AAROM with strap   Seated at EOM: 81 degrees of Flexion AROM  2022:  0-105 degrees AAROM without strap   4/13/2022: 0-110 (115)  4/18/2022: 0- 112 AAROM  Treatment     Black received the treatments listed below:      Black received therapeutic exercises to develop strength, endurance, ROM, flexibility and posture for 50 minutes including:    *BOLD = progression/addition of exercise    Aerobic Activity for AAROM and to improve mobility for 10 minutes; full forward revolutions, seat 14  Quad sets: 10 seconds, 15x  Heel prop: 3 minutes   Short arc quad: medium bolster 10 seconds, 20x, 4lbs   Long arc quads: 7.5 lbs, 10 seconds, 20x, 7.5 lbs  Sit to stands with red theraband around knees: 2x12- used mirror to encourage equal weightbearing- NP      MANUAL THERAPY TECHNIQUES including Joint mobilizations and Soft tissue Mobilization were applied to left knee for 10 minutes:   - Knee flexion PROM seated at EOM + contract relax  - Grade II-III Patella Mobs  -distraction + IR + PROM flexion    Patient received cold pack for 10 minutes to L knee with LE elevated for pain relief and swelling reduction.    Patient Education and Home Exercises     Home Exercises Provided and Patient Education Provided     Education provided:   - HEP compliance and diligence with HEP  - make time to rest and elevate to decrease swelling    Written Home Exercises Provided: yes. Exercises were reviewed and Black was able to demonstrate them prior to the end of the session.  Black demonstrated good  understanding of the education provided. See EMR under Patient Instructions for exercises provided during therapy sessions    ASSESSMENT     Pt presents 4 weeks s/p TKA .Pt exhibits 0 degrees of extension and good quadriceps activation although is quick to fatigue with exercises performed and c/o pain with exercises.   Pt exhibits increased swelling, pain and poor compliance to therapy recommendations regarding resting and elevating to help manage symptoms .  Pt also exhibited difficult staying awake during  today's session as he reports increased anxiety causing sleep disturbances and was extremely fatigued throughout session limiting progressions. Pt was encouraged to communicate with his doctor regarding symptoms and also discussed and practiced breathing techniques for patient to perform when he notices increased anxiety. Therapy progressions remain limited due to pts poor HEP compliance and over exertion outside of therapy increasing his pain/swelling .    Will continue to progress with strengthening and closed chain activities as able  .     Black Is progressing well towards his goals.   Pt prognosis is Excellent.     Pt will continue to benefit from skilled outpatient physical therapy to address the deficits listed in the problem list box on initial evaluation, provide pt/family education and to maximize pt's level of independence in the home and community environment.   Pt's spiritual, cultural and educational needs considered and pt agreeable to plan of care and goals.     Anticipated barriers to physical therapy: None to note     Short Term Goals (3 Weeks):   1. Patient will be independent with home exercise program to supplement physical therapy treatment in improving functional status.  2. Patient will improve (L) lower extremity strength to at least 75% of (R) lower extremity strength as measured via MicroFet handheld dynamometer to improve strength for closed chain tasks.   3. Patient will improve (L) knee active range of motion to 0-90 degrees to promote increased ease of sit<>stand transfers.  4. Patient will perform timed up and go with less restrictive assistive device in < 20 seconds to improve gait speed and safety with community ambulation.     Long Term Goals (6 Weeks):   1. Patient will improve (L) lower extremity strength to at least 90% of (R) lower extremity strength as measured via MicroFet handheld dynamometer to improve strength for closed chain tasks.   2. Patient will improve the total  FOTO Knee Survey Score to </= 30% limited to demonstrate increased perceived functional mobility.  3. Patient will improve score on KOOS Jr. section of FOTO Knee Survey to = </= 70% to demonstrate increased perceived functional mobility.  4. Patient will perform timed up and go with least restrictive assistive device in < 13.5 seconds to improve gait speed and safety with community ambulation.  5. Patient will perform at least 17 sit to stands in 30 seconds without UE support to demonstrate increased functional strength.   6. Patient will improve (L) knee active range of motion to 0-120 degrees to promote higher level transfers and transitions without limitation.     PLAN   Improve quad/hip strengthening with standing activities.     Melissa Nix, PTA

## 2022-04-18 NOTE — TELEPHONE ENCOUNTER
Called pt this afternoon to discuss report of anxiety over the past 3 months. CHANTELLE-7 score = 14. When asked pt states anxiety is primarily related to his multiple health conditions and the recent death of his mother.  Discussed differential diagnosis as well as treatment options.  Patient denies taking medication for anxiety in the past.  After consideration patient agreed with selection of Vistaril 25 mg b.i.d. p.r.n. for anxiety.  Patient also reports is interested in therapy however current opioid use exams him from  referral.  Instead will refer to Psychiatry provided with phone number for scheduling.  Patient denies thoughts of self-harm.  Educational material sent a [employer] account.    ED precautions given.   Notify provider if symptoms do not resolve or increase in severity.   Patient verbalizes understanding and agrees with plan of care.

## 2022-04-20 ENCOUNTER — CLINICAL SUPPORT (OUTPATIENT)
Dept: REHABILITATION | Facility: HOSPITAL | Age: 62
End: 2022-04-20
Payer: MEDICARE

## 2022-04-20 DIAGNOSIS — R29.898 WEAKNESS OF LEFT LOWER EXTREMITY: ICD-10-CM

## 2022-04-20 DIAGNOSIS — R26.9 GAIT ABNORMALITY: ICD-10-CM

## 2022-04-20 DIAGNOSIS — M25.662 KNEE STIFFNESS, LEFT: Primary | ICD-10-CM

## 2022-04-20 PROCEDURE — 97110 THERAPEUTIC EXERCISES: CPT

## 2022-04-20 NOTE — PROGRESS NOTES
[facility]   Physical Therapy Treatment Note     Name: Black Preciado  Clinic Number: [id number]    Therapy Diagnosis:   Encounter Diagnoses   Name Primary?    Knee stiffness, left Yes    Gait abnormality     Weakness of left lower extremity      Physician: Noman Jeffery, *    Visit Date: 2022    Physician Orders: PT Eval and Treat   Medical Diagnosis from Referral: M17.12 (ICD-10-CM) - Primary osteoarthritis of left knee   M21.162 (ICD-10-CM) - Acquired genu varum, left   M25.561,M25.562,G89.29 (ICD-10-CM) - Chronic pain of both knees  Evaluation Date: 3/21/2022  Authorization Period Expiration: 22  Plan of Care Expiration: 2022  Visit # / Visits authorized:     PTA visit : 0 /      Time In: 0900 am  Time Out: 1000 am   Total Treatment Time: 60 minutes   Total Billable Time: 60 minutes (4 TE)      Precautions: Standard, hx of syncope     SUBJECTIVE     Patient reports: Pt states he is doing much better today, was able to get more sleep and is experiencing less pain in his knee   Response to previous treatment: very sore   Functional change: ambulating with straight cane; improvement in range of motion     Pain: 3/10, currently  Location: Left knee    OBJECTIVE     Objective Measures updated at progress report unless specified.   DOS 3/18/2022  6 week follow up 2022    3/25/2022: L knee AAROM : 0 - 0 - 85 degrees AAROM   3/28/2022: Pt presents amb c/ SPC                    L knee AAROM : 0-0-86   3/30/2022: STS: 5 no AD                    TU seconds                      L knee : 0-0-86 AAROM  2022: 0-92 degrees AAROM with strap   Seated at EOM: 81 degrees of Flexion AROM  2022: 0-105 degrees AAROM without strap   2022: 0-110 (115)  2022: 0- 112 AAROM  Treatment     Black received the treatments listed below:      Black received therapeutic exercises to develop strength, endurance, ROM, flexibility and posture for 55  minutes including:    *BOLD = progression/addition of exercise    Aerobic Activity for AAROM and to improve mobility for 10 minutes; full forward revolutions, seat 14  Heel cord stretch; 45 seconds x 3 seconds   Quad sets w/ 1/2 roll under ankle; 5 sec hold; 3 x 10 reps   Quad sets w/ 1/2 roll under knee; 5 sec hold; 3 x 10 reps   Short arc quad: medium bolster; 5 seconds, 20x, 4lb AW  Long arc quads: 7.5 lbs, 10 seconds, 20x, 7.5 lbs  Straight Leg Rase; 3 x 10 reps; 3 sec hold   Side lying Clams; 3 x 10 reps; 3 sec hold     Sit to stands with red theraband around knees: 2x12 (next visit)   Stairs (next visit)   Standing calf raise (next visit)   Shuttle Squats (next visit)       MANUAL THERAPY TECHNIQUES including Joint mobilizations and Soft tissue Mobilization were applied to left knee for 00 minutes:   - Knee flexion PROM seated at EOM + contract relax  - Grade II-III Patella Mobs  -distraction + IR + PROM flexion    Patient received cold pack for 10 minutes to L knee with LE elevated for pain relief and swelling reduction.    Patient Education and Home Exercises     Home Exercises Provided and Patient Education Provided     Education provided:   - HEP compliance and diligence with HEP  - make time to rest and elevate to decrease swelling    Written Home Exercises Provided: yes. Exercises were reviewed and Black was able to demonstrate them prior to the end of the session.  Black demonstrated good  understanding of the education provided. See EMR under Patient Instructions for exercises provided during therapy sessions    ASSESSMENT     Pt presents 4 weeks s/p TKA .Pt exhibits 0 degrees of extension and good quadriceps activation although is quick to fatigue with exercises performed and c/o pain with exercises. Pt exhibits increased swelling, pain and poor compliance to therapy recommendations regarding resting and elevating to help manage symptoms. Pt with improvement in attitude towards therapy today but  still limited in ability to complete full exercise program due to frequent breaks, moderate pain complaints and muscle cramping.   Will continue to progress with strengthening and closed chain activities as able .      Black Is progressing well towards his goals.   Pt prognosis is Excellent.     Pt will continue to benefit from skilled outpatient physical therapy to address the deficits listed in the problem list box on initial evaluation, provide pt/family education and to maximize pt's level of independence in the home and community environment.   Pt's spiritual, cultural and educational needs considered and pt agreeable to plan of care and goals.     Anticipated barriers to physical therapy: None to note     Short Term Goals (3 Weeks):   1. Patient will be independent with home exercise program to supplement physical therapy treatment in improving functional status.  2. Patient will improve (L) lower extremity strength to at least 75% of (R) lower extremity strength as measured via MicroFet handheld dynamometer to improve strength for closed chain tasks.   3. Patient will improve (L) knee active range of motion to 0-90 degrees to promote increased ease of sit<>stand transfers.  4. Patient will perform timed up and go with less restrictive assistive device in < 20 seconds to improve gait speed and safety with community ambulation.     Long Term Goals (6 Weeks):   1. Patient will improve (L) lower extremity strength to at least 90% of (R) lower extremity strength as measured via MicroFet handheld dynamometer to improve strength for closed chain tasks.   2. Patient will improve the total FOTO Knee Survey Score to </= 30% limited to demonstrate increased perceived functional mobility.  3. Patient will improve score on KOOS Jr. section of FOTO Knee Survey to = </= 70% to demonstrate increased perceived functional mobility.  4. Patient will perform timed up and go with least restrictive assistive device in  < 13.5 seconds to improve gait speed and safety with community ambulation.  5. Patient will perform at least 17 sit to stands in 30 seconds without UE support to demonstrate increased functional strength.   6. Patient will improve (L) knee active range of motion to 0-120 degrees to promote higher level transfers and transitions without limitation.     PLAN   Improve quad/hip strengthening with standing activities.     Angeles Marisa, PT, DPT

## 2022-04-25 ENCOUNTER — PATIENT MESSAGE (OUTPATIENT)
Dept: SPORTS MEDICINE | Facility: CLINIC | Age: 62
End: 2022-04-25
Payer: MEDICARE

## 2022-04-25 ENCOUNTER — CLINICAL SUPPORT (OUTPATIENT)
Dept: REHABILITATION | Facility: HOSPITAL | Age: 62
End: 2022-04-25
Payer: MEDICARE

## 2022-04-25 DIAGNOSIS — R26.9 GAIT ABNORMALITY: ICD-10-CM

## 2022-04-25 DIAGNOSIS — R29.898 WEAKNESS OF LEFT LOWER EXTREMITY: ICD-10-CM

## 2022-04-25 DIAGNOSIS — M25.662 KNEE STIFFNESS, LEFT: Primary | ICD-10-CM

## 2022-04-25 PROCEDURE — 97110 THERAPEUTIC EXERCISES: CPT

## 2022-04-25 NOTE — PROGRESS NOTES
[facility]   Physical Therapy Treatment Note     Name: Black Preciado  Clinic Number: [id number]    Therapy Diagnosis:   Encounter Diagnoses   Name Primary?    Knee stiffness, left Yes    Gait abnormality     Weakness of left lower extremity      Physician: Noman Jeffery, *    Visit Date: 2022    Physician Orders: PT Eval and Treat   Medical Diagnosis from Referral: M17.12 (ICD-10-CM) - Primary osteoarthritis of left knee   M21.162 (ICD-10-CM) - Acquired genu varum, left   M25.561,M25.562,G89.29 (ICD-10-CM) - Chronic pain of both knees  Evaluation Date: 3/21/2022  Authorization Period Expiration: 22  Plan of Care Expiration: 2022  Visit # / Visits authorized: 15 / 25    PTA visit : 0 / 5     Time In: 0900 am  Time Out: 1000 am   Total Treatment Time: 60 minutes   Total Billable Time: 60 minutes (4 TE)      Precautions: Standard, hx of syncope     SUBJECTIVE     Patient reports: Pt only complaint continues to be getting comfortable at night   Response to previous treatment: improvement in ambulation, able to do a full revolution on the bike   Functional change: ambulating without straight cane     Pain: 5/10, currently  Location: Left knee    OBJECTIVE     Objective Measures updated at progress report unless specified.   DOS 3/18/2022  6 week follow up 2022    3/25/2022: L knee AAROM : 0 - 0 - 85 degrees AAROM   3/28/2022: Pt presents amb c/ SPC                    L knee AAROM : 0-0-86   3/30/2022: STS: 5 no AD                    TU seconds                      L knee : 0-0-86 AAROM  2022: 0-92 degrees AAROM with strap   Seated at EOM: 81 degrees of Flexion AROM  2022: 0-105 degrees AAROM without strap   2022: 0-110 (115)  2022: 0- 112 AAROM  Treatment     Black received the treatments listed below:      Black received therapeutic exercises to develop strength, endurance, ROM, flexibility and posture for 55 minutes  including:    *BOLD = progression/addition of exercise    Aerobic Activity for AAROM and to improve mobility for 10 minutes; full forward revolutions, seat 14  Short arc quad: medium bolster; 5 second hold , 3 x 10 reps, 7.5lb AW  Long arc quads: 7.5 lbs, 5 second hold; 3 x 10 reps     Sit to stands: 3 x 10 reps   Stairs; 5 laps (no hands)  Kneeling on 2 airex; 1 x 15 reps   Shuttle Squats   DBL: 2 cords below; 2 cord above; 3 x 10 reps   SGL: 1 cord below and 1 cord above; 3 x 10 reps       MANUAL THERAPY TECHNIQUES including Joint mobilizations and Soft tissue Mobilization were applied to left knee for 00 minutes:   - Knee flexion PROM seated at EOM + contract relax  - Grade II-III Patella Mobs  -distraction + IR + PROM flexion    Patient received cold pack for 10 minutes to L knee with LE elevated for pain relief and swelling reduction.    Patient Education and Home Exercises     Home Exercises Provided and Patient Education Provided     Education provided:   - HEP compliance and diligence with HEP  - make time to rest and elevate to decrease swelling    Written Home Exercises Provided: yes. Exercises were reviewed and Black was able to demonstrate them prior to the end of the session.  Black demonstrated good  understanding of the education provided. See EMR under Patient Instructions for exercises provided during therapy sessions    ASSESSMENT   Pt improving towards his goals; to be discharged within the next two weeks. Improving tolerance to functional strengthening. Only experiencing increased pain with kneeling and at the beginning of therapy when completing full revolutions on the recumbent bike. Continue to progress strengthening in accordance with symptoms. Pt continuing to experience swelling and educated on importance of continuing to wear his CRYSTAL Hose throughout the day.     Black Is progressing well towards his goals.   Pt prognosis is Excellent.     Pt will continue to benefit from skilled  outpatient physical therapy to address the deficits listed in the problem list box on initial evaluation, provide pt/family education and to maximize pt's level of independence in the home and community environment.   Pt's spiritual, cultural and educational needs considered and pt agreeable to plan of care and goals.     Anticipated barriers to physical therapy: None to note     Short Term Goals (3 Weeks):   1. Patient will be independent with home exercise program to supplement physical therapy treatment in improving functional status.  2. Patient will improve (L) lower extremity strength to at least 75% of (R) lower extremity strength as measured via MicroFet handheld dynamometer to improve strength for closed chain tasks.   3. Patient will improve (L) knee active range of motion to 0-90 degrees to promote increased ease of sit<>stand transfers.  4. Patient will perform timed up and go with less restrictive assistive device in < 20 seconds to improve gait speed and safety with community ambulation.     Long Term Goals (6 Weeks):   1. Patient will improve (L) lower extremity strength to at least 90% of (R) lower extremity strength as measured via MicroFet handheld dynamometer to improve strength for closed chain tasks.   2. Patient will improve the total FOTO Knee Survey Score to </= 30% limited to demonstrate increased perceived functional mobility.  3. Patient will improve score on KOOS Jr. section of FOTO Knee Survey to = </= 70% to demonstrate increased perceived functional mobility.  4. Patient will perform timed up and go with least restrictive assistive device in < 13.5 seconds to improve gait speed and safety with community ambulation.  5. Patient will perform at least 17 sit to stands in 30 seconds without UE support to demonstrate increased functional strength.   6. Patient will improve (L) knee active range of motion to 0-120 degrees to promote higher level transfers and transitions without limitation.      PLAN   Continue to progress per POC.     Angeles Marisa, PT, DPT

## 2022-04-26 DIAGNOSIS — Z96.652 S/P TKR (TOTAL KNEE REPLACEMENT), LEFT: Primary | ICD-10-CM

## 2022-04-26 RX ORDER — OXYCODONE AND ACETAMINOPHEN 5; 325 MG/1; MG/1
1 TABLET ORAL EVERY 12 HOURS PRN
Qty: 14 TABLET | Refills: 0 | Status: SHIPPED | OUTPATIENT
Start: 2022-04-26 | End: 2023-03-09

## 2022-04-27 ENCOUNTER — CLINICAL SUPPORT (OUTPATIENT)
Dept: REHABILITATION | Facility: HOSPITAL | Age: 62
End: 2022-04-27
Payer: MEDICARE

## 2022-04-27 ENCOUNTER — OFFICE VISIT (OUTPATIENT)
Dept: SPORTS MEDICINE | Facility: CLINIC | Age: 62
End: 2022-04-27
Payer: MEDICARE

## 2022-04-27 ENCOUNTER — PATIENT MESSAGE (OUTPATIENT)
Dept: SPORTS MEDICINE | Facility: CLINIC | Age: 62
End: 2022-04-27

## 2022-04-27 ENCOUNTER — HOSPITAL ENCOUNTER (OUTPATIENT)
Dept: RADIOLOGY | Facility: HOSPITAL | Age: 62
Discharge: HOME | End: 2022-04-27
Attending: ORTHOPAEDIC SURGERY
Payer: MEDICARE

## 2022-04-27 VITALS
HEIGHT: 70 IN | WEIGHT: 290 LBS | BODY MASS INDEX: 41.52 KG/M2 | HEART RATE: 68 BPM | SYSTOLIC BLOOD PRESSURE: 127 MMHG | TEMPERATURE: 99 F | DIASTOLIC BLOOD PRESSURE: 81 MMHG

## 2022-04-27 DIAGNOSIS — M25.662 KNEE STIFFNESS, LEFT: Primary | ICD-10-CM

## 2022-04-27 DIAGNOSIS — R29.898 WEAKNESS OF LEFT LOWER EXTREMITY: ICD-10-CM

## 2022-04-27 DIAGNOSIS — M25.562 LEFT KNEE PAIN, UNSPECIFIED CHRONICITY: ICD-10-CM

## 2022-04-27 DIAGNOSIS — M21.162 GENU VARUM, ACQUIRED, LEFT: ICD-10-CM

## 2022-04-27 DIAGNOSIS — M25.562 LEFT KNEE PAIN, UNSPECIFIED CHRONICITY: Primary | ICD-10-CM

## 2022-04-27 DIAGNOSIS — M17.12 PRIMARY OSTEOARTHRITIS OF LEFT KNEE: ICD-10-CM

## 2022-04-27 DIAGNOSIS — Z96.652 S/P TKR (TOTAL KNEE REPLACEMENT), LEFT: ICD-10-CM

## 2022-04-27 DIAGNOSIS — R26.9 GAIT ABNORMALITY: ICD-10-CM

## 2022-04-27 PROCEDURE — 3079F PR MOST RECENT DIASTOLIC BLOOD PRESSURE 80-89 MM HG: ICD-10-PCS | Mod: CPTII,S$GLB,, | Performed by: ORTHOPAEDIC SURGERY

## 2022-04-27 PROCEDURE — 3008F BODY MASS INDEX DOCD: CPT | Mod: CPTII,S$GLB,, | Performed by: ORTHOPAEDIC SURGERY

## 2022-04-27 PROCEDURE — 99999 PR PBB SHADOW E&M-EST. PATIENT-LVL III: ICD-10-PCS | Mod: PBBFAC,,, | Performed by: ORTHOPAEDIC SURGERY

## 2022-04-27 PROCEDURE — 73564 X-RAY EXAM KNEE 4 OR MORE: CPT | Mod: 26,50,, | Performed by: RADIOLOGY

## 2022-04-27 PROCEDURE — 3074F SYST BP LT 130 MM HG: CPT | Mod: CPTII,S$GLB,, | Performed by: ORTHOPAEDIC SURGERY

## 2022-04-27 PROCEDURE — 1160F RVW MEDS BY RX/DR IN RCRD: CPT | Mod: CPTII,S$GLB,, | Performed by: ORTHOPAEDIC SURGERY

## 2022-04-27 PROCEDURE — 99024 PR POST-OP FOLLOW-UP VISIT: ICD-10-PCS | Mod: S$GLB,,, | Performed by: ORTHOPAEDIC SURGERY

## 2022-04-27 PROCEDURE — 1159F PR MEDICATION LIST DOCUMENTED IN MEDICAL RECORD: ICD-10-PCS | Mod: CPTII,S$GLB,, | Performed by: ORTHOPAEDIC SURGERY

## 2022-04-27 PROCEDURE — 99024 POSTOP FOLLOW-UP VISIT: CPT | Mod: S$GLB,,, | Performed by: ORTHOPAEDIC SURGERY

## 2022-04-27 PROCEDURE — 1160F PR REVIEW ALL MEDS BY PRESCRIBER/CLIN PHARMACIST DOCUMENTED: ICD-10-PCS | Mod: CPTII,S$GLB,, | Performed by: ORTHOPAEDIC SURGERY

## 2022-04-27 PROCEDURE — 97760 PR ORTHOTIC MGMT&TRAINJ INITIAL ENC EA 15 MINS: ICD-10-PCS | Mod: GP,S$GLB,, | Performed by: ORTHOPAEDIC SURGERY

## 2022-04-27 PROCEDURE — 3079F DIAST BP 80-89 MM HG: CPT | Mod: CPTII,S$GLB,, | Performed by: ORTHOPAEDIC SURGERY

## 2022-04-27 PROCEDURE — 3074F PR MOST RECENT SYSTOLIC BLOOD PRESSURE < 130 MM HG: ICD-10-PCS | Mod: CPTII,S$GLB,, | Performed by: ORTHOPAEDIC SURGERY

## 2022-04-27 PROCEDURE — 97110 THERAPEUTIC EXERCISES: CPT

## 2022-04-27 PROCEDURE — 97760 ORTHOTIC MGMT&TRAING 1ST ENC: CPT | Mod: GP,S$GLB,, | Performed by: ORTHOPAEDIC SURGERY

## 2022-04-27 PROCEDURE — 3008F PR BODY MASS INDEX (BMI) DOCUMENTED: ICD-10-PCS | Mod: CPTII,S$GLB,, | Performed by: ORTHOPAEDIC SURGERY

## 2022-04-27 PROCEDURE — 99999 PR PBB SHADOW E&M-EST. PATIENT-LVL III: CPT | Mod: PBBFAC,,, | Performed by: ORTHOPAEDIC SURGERY

## 2022-04-27 PROCEDURE — 73564 XR KNEE ORTHO BILAT WITH FLEXION: ICD-10-PCS | Mod: 26,50,, | Performed by: RADIOLOGY

## 2022-04-27 PROCEDURE — 73564 X-RAY EXAM KNEE 4 OR MORE: CPT | Mod: TC,50

## 2022-04-27 PROCEDURE — 1159F MED LIST DOCD IN RCRD: CPT | Mod: CPTII,S$GLB,, | Performed by: ORTHOPAEDIC SURGERY

## 2022-04-27 RX ORDER — CELECOXIB 200 MG/1
200 CAPSULE ORAL 2 TIMES DAILY
Qty: 60 CAPSULE | Refills: 2 | Status: SHIPPED | OUTPATIENT
Start: 2022-04-27 | End: 2022-06-13 | Stop reason: SDUPTHER

## 2022-04-27 RX ORDER — AMOXICILLIN AND CLAVULANATE POTASSIUM 875; 125 MG/1; MG/1
1 TABLET, FILM COATED ORAL 2 TIMES DAILY
Qty: 8 TABLET | Refills: 0 | Status: SHIPPED | OUTPATIENT
Start: 2022-04-27 | End: 2023-03-09

## 2022-04-27 NOTE — PROGRESS NOTES
[facility]   Discharge Note     Name: Black Preciado  Clinic Number: [id number]    Therapy Diagnosis:   Encounter Diagnoses   Name Primary?    Knee stiffness, left Yes    Gait abnormality     Weakness of left lower extremity      Physician: Noman Jeffery, *    Visit Date: 2022    Physician Orders: PT Eval and Treat   Medical Diagnosis from Referral: M17.12 (ICD-10-CM) - Primary osteoarthritis of left knee   M21.162 (ICD-10-CM) - Acquired genu varum, left   M25.561,M25.562,G89.29 (ICD-10-CM) - Chronic pain of both knees  Evaluation Date: 3/21/2022  Authorization Period Expiration: 22  Plan of Care Expiration: 2022  Visit # / Visits authorized:     PTA visit : 0 / 5     Time In: 0900 am  Time Out: 0930 am   Total Treatment Time: 30 minutes   Total Billable Time: 30 minutes (2 TE)      Precautions: Standard, hx of syncope     SUBJECTIVE     Patient reports: Pt only complaint continues to be getting comfortable at night; excited for discharge today. States he is going to the doctor after physical therapy. Plans to continue strengthening on at [place] [address]   Response to previous treatment: improvement in ambulation, able to do a full revolution on the bike   Functional change: ambulating without straight cane     Pain: 0/10, currently  Location: Left knee    OBJECTIVE     Objective Measures updated at progress report unless specified.   DOS 3/18/2022  6 week follow up 2022    3/25/2022: L knee AAROM : 0 - 0 - 85 degrees AAROM   3/28/2022: Pt presents amb c/ SPC                    L knee AAROM : 0-0-86   3/30/2022: STS: 5 no AD                    TU seconds                      L knee : 0-0-86 AAROM  2022: 0-92 degrees AAROM with strap   Seated at EOM: 81 degrees of Flexion AROM  2022: 0-105 degrees AAROM without strap   2022: 0-110 (115)  2022: 0- 112 AAROM    2022  -TUG: 10 seconds without use of AD   -30 sec sit to stand:  9 reps without use of upper extremity   -AROM: 0-0-125 degrees   Treatment       Black received the treatments listed below:      Black received therapeutic exercises to develop strength, endurance, ROM, flexibility and posture for 30 minutes including:      Aerobic Activity for AAROM and to improve mobility for 10 minutes; full forward revolutions, seat 14  Hamstring stretch seated; 30 sec x 3 times   Long arc quads: 7.5 lbs, 5 second hold; 3 x 10 reps   Sit to stands: 3 x 10 reps   Hip Abduction; 1 x 10 reps   Hip extension; 1 x 10 reps   Standing Calf Raises; 1 x 10 reps     -pt HEP reviewed and pt demonstrated each exercise and verbalized understanding       Patient Education and Home Exercises     Home Exercises Provided and Patient Education Provided     Education provided:   Updated on 4/27/2022     Written Home Exercises Provided: yes. Exercises were reviewed and Black was able to demonstrate them prior to the end of the session.  Black demonstrated good  understanding of the education provided. See EMR under Patient Instructions for exercises provided during therapy sessions    ASSESSMENT   Since beginning PT, pt has been seen 16 times since initial evaluation on 03/21/2022. Overall, he has made good, steady progress with his PT treatments and has worked hard towards all of his PT goals as evidenced by subjective and objective improvements. Since attending PT he has reached most of his PT goals. He will be discharged with an updated HEP and was instructed to contact us with any other questions or concerns. Pt agreeable to d/c.    Black Is progressing well towards his goals.   Pt prognosis is Excellent.     Pt will continue to benefit from skilled outpatient physical therapy to address the deficits listed in the problem list box on initial evaluation, provide pt/family education and to maximize pt's level of independence in the home and community environment.   Pt's spiritual, cultural and  educational needs considered and pt agreeable to plan of care and goals.     Anticipated barriers to physical therapy: None to note     Short Term Goals (3 Weeks):   1. Patient will be independent with home exercise program to supplement physical therapy treatment in improving functional status. MET  2. Patient will improve (L) lower extremity strength to at least 75% of (R) lower extremity strength as measured via MicroFet handheld dynamometer to improve strength for closed chain tasks. MET  3. Patient will improve (L) knee active range of motion to 0-90 degrees to promote increased ease of sit<>stand transfers. MET  4. Patient will perform timed up and go with less restrictive assistive device in < 20 seconds to improve gait speed and safety with community ambulation. MET     Long Term Goals (6 Weeks):   1. Patient will improve (L) lower extremity strength to at least 90% of (R) lower extremity strength as measured via MicroFet handheld dynamometer to improve strength for closed chain tasks. Progressing, not met   2. Patient will improve the total FOTO Knee Survey Score to </= 30% limited to demonstrate increased perceived functional mobility.  3. Patient will improve score on KOOS Jr. section of FOTO Knee Survey to = </= 70% to demonstrate increased perceived functional mobility.  4. Patient will perform timed up and go with least restrictive assistive device in < 13.5 seconds to improve gait speed and safety with community ambulation. MET  5. Patient will perform at least 17 sit to stands in 30 seconds without UE support to demonstrate increased functional strength. Progressing, not met  6. Patient will improve (L) knee active range of motion to 0-120 degrees to promote higher level transfers and transitions without limitation. MET    PLAN   Pt to be discharged following referring provider consent     Angeles Marisa PT, DPT

## 2022-04-27 NOTE — PROGRESS NOTES
Subjective:          Chief Complaint: Black Preciado is a 61 y.o. male who had concerns including Post-op Evaluation of the Left Knee.    HPI   .  Patient presents for 6 weeks post op.  Overall doing great.  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      Patient presents to clinic for 2 week postop evaluation of left knee.  Pain today is 4/10.  He has been attending physical therapy 3 days a week at the [facility] location.  He has been taking half of the 10 mg Percocet 2 to 3 times a day.  He has been taking aspirin 325 mg once daily.  Denies any nausea, vomiting, fever, chills, chest pain, shortness of breath.  He is ambulating today with a cane.  Doing well.    Date of Procedure: 3/18/2022   Procedure: Procedure(s) (LRB):  ARTHROPLASTY, KNEE (Left)      Surgeon(s) and Role:     * Estela Trevino MD - Primary     * Trev Delgado MD - Resident - Assisting  Pre-Operative Diagnosis: Primary osteoarthritis of left knee [M17.12]  Genu varum of both lower extremities [M21.161, M21.162]     Post-Operative Diagnosis: Post-Op Diagnosis Codes:     * Primary osteoarthritis of left knee [M17.12]     * Genu varum of both lower extremities [M21.161, M21.162]    Review of Systems   Constitutional: Negative. Negative for chills, fever, weight gain and weight loss.   HENT: Negative for congestion and sore throat.    Eyes: Negative for blurred vision and double vision.   Cardiovascular: Negative for chest pain, leg swelling and palpitations.   Respiratory: Negative for cough and shortness of breath.    Hematologic/Lymphatic: Does not bruise/bleed easily.   Skin: Negative for itching, poor wound healing and rash.   Musculoskeletal: Positive for joint pain, joint swelling and stiffness. Negative for back pain, muscle weakness and myalgias.   Gastrointestinal: Negative for abdominal pain, constipation, diarrhea, nausea and vomiting.   Genitourinary: Negative.  Negative for frequency and hematuria.   Neurological:  Negative for dizziness, headaches, numbness, paresthesias and sensory change.   Psychiatric/Behavioral: Negative for altered mental status and depression. The patient is not nervous/anxious.    Allergic/Immunologic: Negative for hives.                   Objective:        General: Black is well-developed, well-nourished, appears stated age, in no acute distress, alert and oriented to time, place and person.     General    Vitals reviewed.  Constitutional: He is oriented to person, place, and time. He appears well-developed and well-nourished. No distress.   HENT:   Mouth/Throat: No oropharyngeal exudate.   Eyes: Right eye exhibits no discharge. Left eye exhibits no discharge.   Cardiovascular: Normal rate and regular rhythm.    Pulmonary/Chest: Effort normal and breath sounds normal. No respiratory distress.   Neurological: He is alert and oriented to person, place, and time. He has normal reflexes. No cranial nerve deficit. Coordination normal.   Psychiatric: He has a normal mood and affect. His behavior is normal. Judgment and thought content normal.     General Musculoskeletal Exam   Gait: antalgic and abnormal       Right Knee Exam     Inspection   Erythema: absent  Scars: absent  Swelling: absent  Effusion: absent  Deformity: absent  Bruising: absent    Tenderness   The patient is experiencing no tenderness.     Range of Motion   Extension: 0   Flexion: 140     Tests   Meniscus   Gustavo:  Medial - negative Lateral - negative  Ligament Examination Lachman: normal (-1 to 2mm) PCL-Posterior Drawer: normal (0 to 2mm)     MCL - Valgus: normal (0 to 2mm)  LCL - Varus: normalPivot Shift: normal (Equal)Reverse Pivot Shift: normal (Equal)Dial Test at 30 degrees: normal (< 5 degrees)Dial Test at 90 degrees: normal (< 5 degrees)  Posterior Sag Test: negative  Posterolateral Corner: unstable (>15 degrees difference)  Patella   Patellar apprehension: negative  Passive Patellar Tilt: neutral  Patellar Tracking:  normal  Patellar Glide (quadrants): Lateral - 1   Medial - 2  Q-Angle at 90 degrees: normal  Patellar Grind: negative  J-Sign: none    Other   Meniscal Cyst: absent  Popliteal (Baker's) Cyst: absent  Sensation: normal    Left Knee Exam     Inspection   Erythema: absent  Scars: present  Swelling: present  Effusion: present  Deformity: absent  Bruising: absent    Tenderness   Left knee tenderness location: global tenderness.    Range of Motion   Extension: 0   Flexion: 120     Tests   Meniscus   Gustavo:  Medial - negative Lateral - negative  Stability Lachman: normal (-1 to 2mm) PCL-Posterior Drawer: normal (0 to 2mm)  MCL - Valgus: normal (0 to 2mm)  LCL - Varus: normal (0 to 2mm)Pivot Shift: normal (Equal)Reverse Pivot Shift: normal (Equal)Dial Test at 30 degrees: normal (< 5 degrees)Dial Test at 90 degrees: normal (< 5 degrees)  Posterior Sag Test: negative  Posterolateral Corner: unstable (>15 degrees difference)  Patella   Patellar apprehension: negative  Passive Patellar Tilt: neutral  Patellar Tracking: normal  Patellar Glide (Quadrants): Lateral - 1 Medial - 2  Q-Angle at 90 degrees: normal  Patellar Grind: negative  J-Sign: J sign absent    Other   Meniscal Cyst: absent  Popliteal (Baker's) Cyst: absent  Sensation: normal    Comments:        Right Hip Exam     Tests   Lindsay: negative  Left Hip Exam     Tests   Lindsay: negative          Muscle Strength   Left Lower Extremity   Hip Abduction: 5/5   Quadriceps:  4/5   Hamstrin/5     Reflexes     Left Side  Achilles:  2+  Quadriceps:  2+    Right Side   Achilles:  2+  Quadriceps:  2+    Vascular Exam     Right Pulses  Dorsalis Pedis:      2+  Posterior Tibial:      2+        Left Pulses  Dorsalis Pedis:      2+  Posterior Tibial:      2+          RADIOGRAPHS:  Left knee:  FINDINGS:  Postoperative changes are now identified relating to a recent left knee arthroplasty procedure, prostheses appearing unremarkable.  No unusual postoperative findings or  significant detrimental change since the immediate postoperative examination of 03/18/2022 appreciated.        Assessment:       Encounter Diagnoses   Name Primary?    Left knee pain, unspecified chronicity Yes    Primary osteoarthritis of left knee     Genu varum, acquired, left     S/P TKR (total knee replacement), left           Plan:         1. IKDC, SF-12 and KOOS was filled out today in clinic.     RTC in 6  weeks with Dr. Estela Trevino Patient will fill out IKDC, SF-12 and KOOS on return.    2. Medications: Refills of the following Rx were sent to patients preferred Pharmacy:  celecoxib (CELEBREX) 200 MG capsule    3. Physical Therapy: Continue/Begin: Continue at [facility]       4. HEP: HEP 20217 - Dr Trevino, instructed and demonstrated a Knee HEP. The patient then demonstrated understanding of exercises and proper technique. This program was performed for 15 minutes.     5. Procedures/Procedural Planning:   N/A    6. DME: 73440 - Dr.Deryk Trevino, Mary Salguero, [facility] performed a custom orthotic / brace adjustment, fitting and training with the patient. The patient demonstrated understanding and proper care. This was performed for 20 minutes.    7. Work/Sport Status: Retired    8. Visit Summary: Has dental appointment soon will send Augmentin and refill celebrex.                       Patient questionnaires may have been collected.

## 2022-04-28 RX ORDER — AMOXICILLIN AND CLAVULANATE POTASSIUM 875; 125 MG/1; MG/1
1 TABLET, FILM COATED ORAL EVERY 12 HOURS
Qty: 8 TABLET | Refills: 0 | Status: SHIPPED | OUTPATIENT
Start: 2022-04-28 | End: 2023-03-09

## 2022-04-28 RX ORDER — AMOXICILLIN AND CLAVULANATE POTASSIUM 875; 125 MG/1; MG/1
1 TABLET, FILM COATED ORAL EVERY 12 HOURS
Qty: 8 TABLET | Refills: 0 | Status: SHIPPED | OUTPATIENT
Start: 2022-04-28 | End: 2022-04-28

## 2022-05-05 ENCOUNTER — PATIENT MESSAGE (OUTPATIENT)
Dept: SPORTS MEDICINE | Facility: CLINIC | Age: 62
End: 2022-05-05
Payer: MEDICARE

## 2022-05-05 DIAGNOSIS — Z96.652 S/P TOTAL KNEE REPLACEMENT, LEFT: Primary | ICD-10-CM

## 2022-05-05 RX ORDER — OXYCODONE AND ACETAMINOPHEN 5; 325 MG/1; MG/1
1 TABLET ORAL EVERY 12 HOURS PRN
Qty: 14 TABLET | Refills: 0 | Status: SHIPPED | OUTPATIENT
Start: 2022-05-05 | End: 2023-03-09

## 2022-06-13 ENCOUNTER — HOSPITAL ENCOUNTER (OUTPATIENT)
Dept: RADIOLOGY | Facility: HOSPITAL | Age: 62
Discharge: HOME | End: 2022-06-13
Attending: ORTHOPAEDIC SURGERY
Payer: MEDICARE

## 2022-06-13 ENCOUNTER — OFFICE VISIT (OUTPATIENT)
Dept: SPORTS MEDICINE | Facility: CLINIC | Age: 62
End: 2022-06-13
Payer: MEDICARE

## 2022-06-13 VITALS
HEART RATE: 64 BPM | SYSTOLIC BLOOD PRESSURE: 119 MMHG | BODY MASS INDEX: 41.52 KG/M2 | RESPIRATION RATE: 18 BRPM | DIASTOLIC BLOOD PRESSURE: 80 MMHG | HEIGHT: 70 IN | WEIGHT: 290 LBS

## 2022-06-13 DIAGNOSIS — M21.161 ACQUIRED GENU VARUM, RIGHT: ICD-10-CM

## 2022-06-13 DIAGNOSIS — M25.562 ACUTE PAIN OF LEFT KNEE: ICD-10-CM

## 2022-06-13 DIAGNOSIS — M25.562 ACUTE PAIN OF LEFT KNEE: Primary | ICD-10-CM

## 2022-06-13 DIAGNOSIS — Z96.652 S/P TOTAL KNEE REPLACEMENT, LEFT: ICD-10-CM

## 2022-06-13 DIAGNOSIS — M17.12 PRIMARY OSTEOARTHRITIS OF LEFT KNEE: ICD-10-CM

## 2022-06-13 DIAGNOSIS — M21.162 GENU VARUM, ACQUIRED, LEFT: ICD-10-CM

## 2022-06-13 PROCEDURE — 73564 X-RAY EXAM KNEE 4 OR MORE: CPT | Mod: TC,50

## 2022-06-13 PROCEDURE — 99999 PR PBB SHADOW E&M-EST. PATIENT-LVL V: CPT | Mod: PBBFAC,,, | Performed by: ORTHOPAEDIC SURGERY

## 2022-06-13 PROCEDURE — 1160F PR REVIEW ALL MEDS BY PRESCRIBER/CLIN PHARMACIST DOCUMENTED: ICD-10-PCS | Mod: CPTII,S$GLB,, | Performed by: ORTHOPAEDIC SURGERY

## 2022-06-13 PROCEDURE — 3008F PR BODY MASS INDEX (BMI) DOCUMENTED: ICD-10-PCS | Mod: CPTII,S$GLB,, | Performed by: ORTHOPAEDIC SURGERY

## 2022-06-13 PROCEDURE — 99024 PR POST-OP FOLLOW-UP VISIT: ICD-10-PCS | Mod: S$GLB,,, | Performed by: ORTHOPAEDIC SURGERY

## 2022-06-13 PROCEDURE — 1159F MED LIST DOCD IN RCRD: CPT | Mod: CPTII,S$GLB,, | Performed by: ORTHOPAEDIC SURGERY

## 2022-06-13 PROCEDURE — 1160F RVW MEDS BY RX/DR IN RCRD: CPT | Mod: CPTII,S$GLB,, | Performed by: ORTHOPAEDIC SURGERY

## 2022-06-13 PROCEDURE — 99024 POSTOP FOLLOW-UP VISIT: CPT | Mod: S$GLB,,, | Performed by: ORTHOPAEDIC SURGERY

## 2022-06-13 PROCEDURE — 99999 PR PBB SHADOW E&M-EST. PATIENT-LVL V: ICD-10-PCS | Mod: PBBFAC,,, | Performed by: ORTHOPAEDIC SURGERY

## 2022-06-13 PROCEDURE — 73564 XR KNEE ORTHO BILAT WITH FLEXION: ICD-10-PCS | Mod: 26,50,, | Performed by: INTERNAL MEDICINE

## 2022-06-13 PROCEDURE — 3008F BODY MASS INDEX DOCD: CPT | Mod: CPTII,S$GLB,, | Performed by: ORTHOPAEDIC SURGERY

## 2022-06-13 PROCEDURE — 3079F PR MOST RECENT DIASTOLIC BLOOD PRESSURE 80-89 MM HG: ICD-10-PCS | Mod: CPTII,S$GLB,, | Performed by: ORTHOPAEDIC SURGERY

## 2022-06-13 PROCEDURE — 3074F SYST BP LT 130 MM HG: CPT | Mod: CPTII,S$GLB,, | Performed by: ORTHOPAEDIC SURGERY

## 2022-06-13 PROCEDURE — 73564 X-RAY EXAM KNEE 4 OR MORE: CPT | Mod: 26,50,, | Performed by: INTERNAL MEDICINE

## 2022-06-13 PROCEDURE — 3079F DIAST BP 80-89 MM HG: CPT | Mod: CPTII,S$GLB,, | Performed by: ORTHOPAEDIC SURGERY

## 2022-06-13 PROCEDURE — 3074F PR MOST RECENT SYSTOLIC BLOOD PRESSURE < 130 MM HG: ICD-10-PCS | Mod: CPTII,S$GLB,, | Performed by: ORTHOPAEDIC SURGERY

## 2022-06-13 PROCEDURE — 1159F PR MEDICATION LIST DOCUMENTED IN MEDICAL RECORD: ICD-10-PCS | Mod: CPTII,S$GLB,, | Performed by: ORTHOPAEDIC SURGERY

## 2022-06-13 RX ORDER — CELECOXIB 200 MG/1
200 CAPSULE ORAL 2 TIMES DAILY
Qty: 60 CAPSULE | Refills: 2 | Status: SHIPPED | OUTPATIENT
Start: 2022-06-13 | End: 2022-09-15 | Stop reason: SDUPTHER

## 2022-06-13 NOTE — PROGRESS NOTES
Subjective:          Chief Complaint: Black Preciado is a 61 y.o. male who had concerns including Follow-up of the Left Knee.    Patient presents to our clinic for 3 month post operative evaluation of the below procedure.     Patient presents for 6 weeks post op.  Overall doing great.  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      Patient presents to clinic for 2 week postop evaluation of left knee.  Pain today is 4/10.  He has been attending physical therapy 3 days a week at the [facility] location.  He has been taking half of the 10 mg Percocet 2 to 3 times a day.  He has been taking aspirin 325 mg once daily.  Denies any nausea, vomiting, fever, chills, chest pain, shortness of breath.  He is ambulating today with a cane.  Doing well.    Date of Procedure: 3/18/2022   Procedure: Procedure(s) (LRB):  ARTHROPLASTY, KNEE (Left)      Surgeon(s) and Role:     * Estela Trevino MD - Primary     * Trev Delgado MD - Resident - Assisting  Pre-Operative Diagnosis: Primary osteoarthritis of left knee (M17.12)  Genu varum of both lower extremities (M21.161, M21.162)     Post-Operative Diagnosis: Post-Op Diagnosis Codes:     * Primary osteoarthritis of left knee (M17.12)     * Genu varum of both lower extremities (M21.161, M21.162)       .    Review of Systems   Constitutional: Negative. Negative for chills, fever, weight gain and weight loss.   HENT: Negative for congestion and sore throat.    Eyes: Negative for blurred vision and double vision.   Cardiovascular: Negative for chest pain, leg swelling and palpitations.   Respiratory: Negative for cough and shortness of breath.    Hematologic/Lymphatic: Does not bruise/bleed easily.   Skin: Negative for itching, poor wound healing and rash.   Musculoskeletal: Positive for joint pain, joint swelling and stiffness. Negative for back pain, muscle weakness and myalgias.   Gastrointestinal: Negative for abdominal pain, constipation, diarrhea, nausea and  vomiting.   Genitourinary: Negative.  Negative for frequency and hematuria.   Neurological: Negative for dizziness, headaches, numbness, paresthesias and sensory change.   Psychiatric/Behavioral: Negative for altered mental status and depression. The patient is not nervous/anxious.    Allergic/Immunologic: Negative for hives.                   Objective:        General: Black is well-developed, well-nourished, appears stated age, in no acute distress, alert and oriented to time, place and person.     General    Vitals reviewed.  Constitutional: He is oriented to person, place, and time. He appears well-developed and well-nourished. No distress.   HENT:   Mouth/Throat: No oropharyngeal exudate.   Eyes: Right eye exhibits no discharge. Left eye exhibits no discharge.   Cardiovascular: Normal rate and regular rhythm.    Pulmonary/Chest: Effort normal and breath sounds normal. No respiratory distress.   Neurological: He is alert and oriented to person, place, and time. He has normal reflexes. No cranial nerve deficit. Coordination normal.   Psychiatric: He has a normal mood and affect. His behavior is normal. Judgment and thought content normal.     General Musculoskeletal Exam   Gait: antalgic and abnormal       Right Knee Exam     Inspection   Erythema: absent  Scars: absent  Swelling: absent  Effusion: absent  Deformity: absent  Bruising: absent    Tenderness   The patient is experiencing no tenderness.     Range of Motion   Extension: 0   Flexion: 130     Tests   Meniscus   Gustavo:  Medial - negative Lateral - negative  Ligament Examination Lachman: normal (-1 to 2mm) PCL-Posterior Drawer: normal (0 to 2mm)     MCL - Valgus: normal (0 to 2mm)  LCL - Varus: normalPivot Shift: normal (Equal)Reverse Pivot Shift: normal (Equal)Dial Test at 30 degrees: normal (< 5 degrees)Dial Test at 90 degrees: normal (< 5 degrees)  Posterior Sag Test: negative  Posterolateral Corner: unstable (>15 degrees difference)  Patella    Patellar apprehension: negative  Passive Patellar Tilt: neutral  Patellar Tracking: normal  Patellar Glide (quadrants): Lateral - 1   Medial - 2  Q-Angle at 90 degrees: normal  Patellar Grind: negative  J-Sign: none    Other   Meniscal Cyst: absent  Popliteal (Baker's) Cyst: absent  Sensation: normal    Left Knee Exam     Inspection   Erythema: absent  Scars: present  Swelling: present  Effusion: present  Deformity: absent  Bruising: absent    Tenderness   Left knee tenderness location: global tenderness.    Range of Motion   Extension: 0   Flexion: 130     Tests   Meniscus   Gustavo:  Medial - negative Lateral - negative  Stability Lachman: normal (-1 to 2mm) PCL-Posterior Drawer: normal (0 to 2mm)  MCL - Valgus: normal (0 to 2mm)  LCL - Varus: normal (0 to 2mm)Pivot Shift: normal (Equal)Reverse Pivot Shift: normal (Equal)Dial Test at 30 degrees: normal (< 5 degrees)Dial Test at 90 degrees: normal (< 5 degrees)  Posterior Sag Test: negative  Posterolateral Corner: unstable (>15 degrees difference)  Patella   Patellar apprehension: negative  Passive Patellar Tilt: neutral  Patellar Tracking: normal  Patellar Glide (Quadrants): Lateral - 1 Medial - 2  Q-Angle at 90 degrees: normal  Patellar Grind: negative  J-Sign: J sign absent    Other   Meniscal Cyst: absent  Popliteal (Baker's) Cyst: absent  Sensation: normal    Comments:        Right Hip Exam     Tests   Lindsay: negative  Left Hip Exam     Tests   Lindsay: negative          Muscle Strength   Left Lower Extremity   Hip Abduction: 5/5   Quadriceps:  4/5   Hamstrin/5     Reflexes     Left Side  Achilles:  2+  Quadriceps:  2+    Right Side   Achilles:  2+  Quadriceps:  2+    Vascular Exam     Right Pulses  Dorsalis Pedis:      2+  Posterior Tibial:      2+        Left Pulses  Dorsalis Pedis:      2+  Posterior Tibial:      2+          RADIOGRAPHS:  Left knee:  FINDINGS:  Postoperative changes are now identified relating to a recent left knee arthroplasty  procedure, prostheses appearing unremarkable.  No unusual postoperative findings or significant detrimental change since the immediate postoperative examination of 03/18/2022 appreciated.        Assessment:       Encounter Diagnoses   Name Primary?    Acute pain of left knee Yes    Acquired genu varum, right     S/P total knee replacement, left           Plan:         1. IKDC, SF-12 and KOOS was filled out today in clinic.     RTC in 3 months with Dr. Estela Trevino Patient will fill out IKDC, SF-12 and KOOS on return.    2. Medications: Refills of the following Rx were sent to patients preferred Pharmacy:  celecoxib (CELEBREX) 200 MG capsule    3. Physical Therapy: Continue/Begin: Continue at     4. HEP: N/A    5. Procedures/Procedural Planning:     knee - left    1. Diagnostic ultrasound performed today, no aspiration or injection performed.       6. DME: 86174 - Dr.Deryk Trevino, Mary Salguero, [facility] performed a custom orthotic / brace adjustment, fitting and training with the patient. The patient demonstrated understanding and proper care. This was performed for 20 minutes.    7. Work/Sport Status: work as tolerated     8. Visit Summary: Patient does have some swelling  In his knee, ultrasound guided aspiration was attempted but no fluid aspirated. Continue with viscoskin & celebrex                          Patient questionnaires may have been collected.

## 2022-07-26 ENCOUNTER — IMMUNIZATION (OUTPATIENT)
Dept: OBSTETRICS AND GYNECOLOGY | Facility: CLINIC | Age: 62
End: 2022-07-26
Payer: MEDICARE

## 2022-07-26 DIAGNOSIS — Z23 NEED FOR VACCINATION: Primary | ICD-10-CM

## 2022-07-26 PROCEDURE — 0054A COVID-19, MRNA, LNP-S, PF, 30 MCG/0.3 ML DOSE VACCINE (PFIZER): CPT | Mod: PBBFAC | Performed by: FAMILY MEDICINE

## 2022-08-25 ENCOUNTER — PATIENT OUTREACH (OUTPATIENT)
Dept: ADMINISTRATIVE | Facility: HOSPITAL | Age: 62
End: 2022-08-25
Payer: MEDICARE

## 2022-09-24 ENCOUNTER — IMMUNIZATION (OUTPATIENT)
Dept: OBSTETRICS AND GYNECOLOGY | Facility: CLINIC | Age: 62
End: 2022-09-24
Payer: MEDICARE

## 2022-09-24 DIAGNOSIS — Z23 NEED FOR INFLUENZA VACCINATION: Primary | ICD-10-CM

## 2022-09-24 PROCEDURE — G0008 FLU VACCINE (QUAD) GREATER THAN OR EQUAL TO 3YO PRESERVATIVE FREE IM: ICD-10-PCS | Mod: S$GLB,,, | Performed by: INTERNAL MEDICINE

## 2022-09-24 PROCEDURE — 90686 IIV4 VACC NO PRSV 0.5 ML IM: CPT | Mod: S$GLB,,, | Performed by: INTERNAL MEDICINE

## 2022-09-24 PROCEDURE — G0008 ADMIN INFLUENZA VIRUS VAC: HCPCS | Mod: S$GLB,,, | Performed by: INTERNAL MEDICINE

## 2022-09-24 PROCEDURE — 90686 FLU VACCINE (QUAD) GREATER THAN OR EQUAL TO 3YO PRESERVATIVE FREE IM: ICD-10-PCS | Mod: S$GLB,,, | Performed by: INTERNAL MEDICINE

## 2022-09-24 NOTE — PROGRESS NOTES
Flu vaccination administered w/o difficulty. Pt advised to wait 15 min to monitor for adverse reactions

## 2022-09-26 ENCOUNTER — PATIENT MESSAGE (OUTPATIENT)
Dept: FAMILY MEDICINE | Facility: CLINIC | Age: 62
End: 2022-09-26
Payer: MEDICARE

## 2022-09-26 NOTE — PROGRESS NOTES
Subjective:          Chief Complaint: Black Preciado is a 62 y.o. male who had concerns including Post-op Evaluation of the Left Knee.    Patient comes to clinic for 6 month postoperative evaluation of the below procedures.     Patient presents to our clinic for 3 month post operative evaluation of the below procedure.     Patient presents for 6 weeks post op.  Overall doing great.  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      Patient presents to clinic for 2 week postop evaluation of left knee.  Pain today is 4/10.  He has been attending physical therapy 3 days a week at the [facility] location.  He has been taking half of the 10 mg Percocet 2 to 3 times a day.  He has been taking aspirin 325 mg once daily.  Denies any nausea, vomiting, fever, chills, chest pain, shortness of breath.  He is ambulating today with a cane.  Doing well.    Date of Procedure: 3/18/2022   Procedure: Procedure(s) (LRB):  ARTHROPLASTY, KNEE (Left)      Surgeon(s) and Role:     * Estela Trevino MD - Primary     * Trev Delgado MD - Resident - Assisting  Pre-Operative Diagnosis: Primary osteoarthritis of left knee [M17.12]  Genu varum of both lower extremities [M21.161, M21.162]     Post-Operative Diagnosis: Post-Op Diagnosis Codes:     * Primary osteoarthritis of left knee [M17.12]     * Genu varum of both lower extremities [M21.161, M21.162]       .    Review of Systems   Constitutional: Negative. Negative for chills, fever, weight gain and weight loss.   HENT:  Negative for congestion and sore throat.    Eyes:  Negative for blurred vision and double vision.   Cardiovascular:  Negative for chest pain, leg swelling and palpitations.   Respiratory:  Negative for cough and shortness of breath.    Hematologic/Lymphatic: Does not bruise/bleed easily.   Skin:  Negative for itching, poor wound healing and rash.   Musculoskeletal:  Positive for joint pain, joint swelling and stiffness. Negative for back pain, muscle weakness  and myalgias.   Gastrointestinal:  Negative for abdominal pain, constipation, diarrhea, nausea and vomiting.   Genitourinary: Negative.  Negative for frequency and hematuria.   Neurological:  Negative for dizziness, headaches, numbness, paresthesias and sensory change.   Psychiatric/Behavioral:  Negative for altered mental status and depression. The patient is not nervous/anxious.    Allergic/Immunologic: Negative for hives.                 Objective:        General: Black is well-developed, well-nourished, appears stated age, in no acute distress, alert and oriented to time, place and person.     General    Vitals reviewed.  Constitutional: He is oriented to person, place, and time. He appears well-developed and well-nourished. No distress.   HENT:   Mouth/Throat: No oropharyngeal exudate.   Eyes: Right eye exhibits no discharge. Left eye exhibits no discharge.   Cardiovascular:  Normal rate and regular rhythm.            Pulmonary/Chest: Effort normal and breath sounds normal. No respiratory distress.   Neurological: He is alert and oriented to person, place, and time. He has normal reflexes. No cranial nerve deficit. Coordination normal.   Psychiatric: He has a normal mood and affect. His behavior is normal. Judgment and thought content normal.     General Musculoskeletal Exam   Gait: normal       Right Knee Exam     Inspection   Erythema: absent  Scars: absent  Swelling: absent  Effusion: absent  Deformity: absent  Bruising: absent    Tenderness   The patient is experiencing no tenderness.     Range of Motion   Extension:  0   Flexion:  130     Tests   Meniscus   Gustavo:  Medial - negative Lateral - negative  Ligament Examination   Lachman: normal (-1 to 2mm)   PCL-Posterior Drawer: normal (0 to 2mm)     MCL - Valgus: normal (0 to 2mm)  LCL - Varus: normal  Pivot Shift: normal (Equal)  Reverse Pivot Shift: normal (Equal)  Dial Test at 30 degrees: normal (< 5 degrees)  Dial Test at 90 degrees: normal (< 5  degrees)  Posterior Sag Test: negative  Posterolateral Corner: unstable (>15 degrees difference)  Patella   Patellar apprehension: negative  Passive Patellar Tilt: neutral  Patellar Tracking: normal  Patellar Glide (quadrants): Lateral - 1   Medial - 2  Q-Angle at 90 degrees: normal  Patellar Grind: negative  J-Sign: none    Other   Meniscal Cyst: absent  Popliteal (Baker's) Cyst: absent  Sensation: normal    Left Knee Exam     Inspection   Erythema: absent  Scars: present  Swelling: absent  Effusion: absent  Deformity: absent  Bruising: absent    Tenderness   Left knee tenderness location: global tenderness.    Range of Motion   Extension:  0   Flexion:  130     Tests   Meniscus   Gustavo:  Medial - negative Lateral - negative  Stability   Lachman: normal (-1 to 2mm)   PCL-Posterior Drawer: normal (0 to 2mm)  MCL - Valgus: normal (0 to 2mm)  LCL - Varus: normal (0 to 2mm)  Pivot Shift: normal (Equal)  Reverse Pivot Shift: normal (Equal)  Dial Test at 30 degrees: normal (< 5 degrees)  Dial Test at 90 degrees: normal (< 5 degrees)  Posterior Sag Test: negative  Posterolateral Corner: unstable (>15 degrees difference)  Patella   Patellar apprehension: negative  Passive Patellar Tilt: neutral  Patellar Tracking: normal  Patellar Glide (Quadrants): Lateral - 1 Medial - 2  Q-Angle at 90 degrees: normal  Patellar Grind: negative  J-Sign: J sign absent    Other   Meniscal Cyst: absent  Popliteal (Baker's) Cyst: absent  Sensation: normal    Comments:        Right Hip Exam     Tests   Lindsay: negative  Left Hip Exam     Tests   Lindsay: negative          Muscle Strength   Left Lower Extremity   Hip Abduction: 5/5   Quadriceps:  4/5   Hamstrin/5     Reflexes     Left Side  Achilles:  2+  Quadriceps:  2+    Right Side   Achilles:  2+  Quadriceps:  2+    Vascular Exam     Right Pulses  Dorsalis Pedis:      2+  Posterior Tibial:      2+        Left Pulses  Dorsalis Pedis:      2+  Posterior Tibial:       2+      RADIOGRAPHS:  Left knee:  FINDINGS:  Postoperative changes are now identified relating to a recent left knee arthroplasty procedure, prostheses appearing unremarkable.  No unusual postoperative findings or significant detrimental change since the immediate postoperative examination of 03/18/2022 appreciated.    X-ray Knee Ortho Bilateral with Flexion  Narrative: EXAMINATION:  XR KNEE ORTHO BILAT WITH FLEXION    CLINICAL HISTORY:  Pain in left knee    TECHNIQUE:  AP standing of both knees, PA flexion standing views of both knees, and Merchant views of both knees were performed.  Lateral views of both knees were also performed.    COMPARISON:  06/13/2022    FINDINGS:  Bones are satisfactorily mineralized.  Postoperative changes of TKA are again identified on the left.  Position and alignment of the prosthesis appear satisfactory and similar to the previous study.  There may have been some interval decrease in the amount of suprapatellar effusion on the left.  On the right, there is narrowing of the medial compartment of the tibiofemoral joint space with tricompartmental hypertrophic degenerative changes.  No fracture, dislocation, or osseous destruction.  No joint effusion on the right.  Impression: Left TKA and right DJD, similar to prior exam.  There may have been a little interval decrease in the amount of suprapatellar effusion on the left.    Electronically signed by: Chano Aaron MD  Date:    10/03/2022  Time:    10:40          Assessment:       Encounter Diagnoses   Name Primary?    Acute pain of left knee Yes    S/P total knee replacement, left     Gait abnormality     BMI 40.0-44.9, adult             Plan:         1. RTC in 1 years with Dr. Estela Trevino. IKDC, SF-12 and KOOS was filled out today in clinic. Patient will fill out IKDC, SF-12 and KOOS on return.    2. Medications: Refills of the following Rx were sent to patients preferred Pharmacy:  celecoxib (CELEBREX) 200 MG capsule    3. Physical  Therapy:     4. HEP: N/A    5. Procedures/Procedural Planning:   N/A    6. DME: N/A    7. Work/Sport Status: retired    8. Visit Summary: Patient doing well, recommend dropping weight below BMI of 40, preferably 35 to help with pressure on knee                                Patient questionnaires may have been collected.

## 2022-10-03 ENCOUNTER — OFFICE VISIT (OUTPATIENT)
Dept: SPORTS MEDICINE | Facility: CLINIC | Age: 62
End: 2022-10-03
Payer: MEDICARE

## 2022-10-03 ENCOUNTER — HOSPITAL ENCOUNTER (OUTPATIENT)
Dept: RADIOLOGY | Facility: HOSPITAL | Age: 62
Discharge: HOME | End: 2022-10-03
Attending: ORTHOPAEDIC SURGERY
Payer: MEDICARE

## 2022-10-03 ENCOUNTER — PATIENT MESSAGE (OUTPATIENT)
Dept: FAMILY MEDICINE | Facility: CLINIC | Age: 62
End: 2022-10-03
Payer: MEDICARE

## 2022-10-03 VITALS
WEIGHT: 290 LBS | SYSTOLIC BLOOD PRESSURE: 117 MMHG | DIASTOLIC BLOOD PRESSURE: 70 MMHG | BODY MASS INDEX: 41.52 KG/M2 | TEMPERATURE: 98 F | HEART RATE: 77 BPM | HEIGHT: 70 IN

## 2022-10-03 DIAGNOSIS — R26.9 GAIT ABNORMALITY: ICD-10-CM

## 2022-10-03 DIAGNOSIS — M17.12 PRIMARY OSTEOARTHRITIS OF LEFT KNEE: ICD-10-CM

## 2022-10-03 DIAGNOSIS — Z96.652 S/P TOTAL KNEE REPLACEMENT, LEFT: ICD-10-CM

## 2022-10-03 DIAGNOSIS — M25.562 ACUTE PAIN OF LEFT KNEE: Primary | ICD-10-CM

## 2022-10-03 DIAGNOSIS — M21.162 GENU VARUM, ACQUIRED, LEFT: ICD-10-CM

## 2022-10-03 DIAGNOSIS — M25.562 ACUTE PAIN OF LEFT KNEE: ICD-10-CM

## 2022-10-03 PROCEDURE — 3008F PR BODY MASS INDEX (BMI) DOCUMENTED: ICD-10-PCS | Mod: CPTII,S$GLB,, | Performed by: ORTHOPAEDIC SURGERY

## 2022-10-03 PROCEDURE — 1159F PR MEDICATION LIST DOCUMENTED IN MEDICAL RECORD: ICD-10-PCS | Mod: CPTII,S$GLB,, | Performed by: ORTHOPAEDIC SURGERY

## 2022-10-03 PROCEDURE — 73564 X-RAY EXAM KNEE 4 OR MORE: CPT | Mod: TC,50

## 2022-10-03 PROCEDURE — 73564 X-RAY EXAM KNEE 4 OR MORE: CPT | Mod: 26,50,, | Performed by: RADIOLOGY

## 2022-10-03 PROCEDURE — 99999 PR PBB SHADOW E&M-EST. PATIENT-LVL IV: CPT | Mod: PBBFAC,,, | Performed by: ORTHOPAEDIC SURGERY

## 2022-10-03 PROCEDURE — 73564 XR KNEE ORTHO BILAT WITH FLEXION: ICD-10-PCS | Mod: 26,50,, | Performed by: RADIOLOGY

## 2022-10-03 PROCEDURE — 3078F DIAST BP <80 MM HG: CPT | Mod: CPTII,S$GLB,, | Performed by: ORTHOPAEDIC SURGERY

## 2022-10-03 PROCEDURE — 3074F SYST BP LT 130 MM HG: CPT | Mod: CPTII,S$GLB,, | Performed by: ORTHOPAEDIC SURGERY

## 2022-10-03 PROCEDURE — 99214 OFFICE O/P EST MOD 30 MIN: CPT | Mod: S$GLB,,, | Performed by: ORTHOPAEDIC SURGERY

## 2022-10-03 PROCEDURE — 99214 PR OFFICE/OUTPT VISIT, EST, LEVL IV, 30-39 MIN: ICD-10-PCS | Mod: S$GLB,,, | Performed by: ORTHOPAEDIC SURGERY

## 2022-10-03 PROCEDURE — 3074F PR MOST RECENT SYSTOLIC BLOOD PRESSURE < 130 MM HG: ICD-10-PCS | Mod: CPTII,S$GLB,, | Performed by: ORTHOPAEDIC SURGERY

## 2022-10-03 PROCEDURE — 1159F MED LIST DOCD IN RCRD: CPT | Mod: CPTII,S$GLB,, | Performed by: ORTHOPAEDIC SURGERY

## 2022-10-03 PROCEDURE — 3008F BODY MASS INDEX DOCD: CPT | Mod: CPTII,S$GLB,, | Performed by: ORTHOPAEDIC SURGERY

## 2022-10-03 PROCEDURE — 3078F PR MOST RECENT DIASTOLIC BLOOD PRESSURE < 80 MM HG: ICD-10-PCS | Mod: CPTII,S$GLB,, | Performed by: ORTHOPAEDIC SURGERY

## 2022-10-03 PROCEDURE — 99999 PR PBB SHADOW E&M-EST. PATIENT-LVL IV: ICD-10-PCS | Mod: PBBFAC,,, | Performed by: ORTHOPAEDIC SURGERY

## 2022-10-03 RX ORDER — CELECOXIB 200 MG/1
200 CAPSULE ORAL 2 TIMES DAILY
Qty: 60 CAPSULE | Refills: 6 | Status: SHIPPED | OUTPATIENT
Start: 2022-10-03 | End: 2023-09-12

## 2022-10-06 ENCOUNTER — PATIENT MESSAGE (OUTPATIENT)
Dept: FAMILY MEDICINE | Facility: CLINIC | Age: 62
End: 2022-10-06
Payer: MEDICARE

## 2022-11-29 ENCOUNTER — PATIENT MESSAGE (OUTPATIENT)
Dept: FAMILY MEDICINE | Facility: CLINIC | Age: 62
End: 2022-11-29
Payer: MEDICARE

## 2023-02-07 ENCOUNTER — OFFICE VISIT (OUTPATIENT)
Dept: OPTOMETRY | Facility: CLINIC | Age: 63
End: 2023-02-07
Payer: MEDICARE

## 2023-02-07 DIAGNOSIS — H52.203 HYPEROPIA OF BOTH EYES WITH ASTIGMATISM AND PRESBYOPIA: ICD-10-CM

## 2023-02-07 DIAGNOSIS — H52.4 HYPEROPIA OF BOTH EYES WITH ASTIGMATISM AND PRESBYOPIA: ICD-10-CM

## 2023-02-07 DIAGNOSIS — Z00.00 ENCOUNTER FOR MEDICARE ANNUAL WELLNESS EXAM: ICD-10-CM

## 2023-02-07 DIAGNOSIS — Z01.00 EYE EXAM, ROUTINE: Primary | ICD-10-CM

## 2023-02-07 DIAGNOSIS — H52.03 HYPEROPIA OF BOTH EYES WITH ASTIGMATISM AND PRESBYOPIA: ICD-10-CM

## 2023-02-07 PROCEDURE — 92015 PR REFRACTION: ICD-10-PCS | Mod: HCNC,S$GLB,, | Performed by: OPTOMETRIST

## 2023-02-07 PROCEDURE — 92004 COMPRE OPH EXAM NEW PT 1/>: CPT | Mod: HCNC,S$GLB,, | Performed by: OPTOMETRIST

## 2023-02-07 PROCEDURE — 99999 PR PBB SHADOW E&M-EST. PATIENT-LVL III: ICD-10-PCS | Mod: PBBFAC,HCNC,, | Performed by: OPTOMETRIST

## 2023-02-07 PROCEDURE — 92015 DETERMINE REFRACTIVE STATE: CPT | Mod: HCNC,S$GLB,, | Performed by: OPTOMETRIST

## 2023-02-07 PROCEDURE — 92004 PR EYE EXAM, NEW PATIENT,COMPREHESV: ICD-10-PCS | Mod: HCNC,S$GLB,, | Performed by: OPTOMETRIST

## 2023-02-07 PROCEDURE — 99999 PR PBB SHADOW E&M-EST. PATIENT-LVL III: CPT | Mod: PBBFAC,HCNC,, | Performed by: OPTOMETRIST

## 2023-02-07 NOTE — PROGRESS NOTES
HPI    Annual eye exam    DLS- 11/06/18    62 y.o. is here for annual eye exam  Pt states wear pal gl most of the time  Pt feels OD vision has gotten worst  Pt want to get a new rx for gl    (-)Flashes (+)Floaters Occassionally  (+)Itch, (+)tear, (-)burn, (+)Dryness.  (-) OTC Drops  (-)Photophobia(-)Glare   (-) Headaches (-) Eye Pain (-) Double vision    Eye Medications: None    No Past Ocular history     Family Ocular history   Pt mother and sister have glaucoma    Last edited by Kasey Gallego MA on 2/7/2023 12:56 PM.            Assessment /Plan     For exam results, see Encounter Report.    Eye exam, routine  -Educated patient on presence of cataracts at today's exam, monitor at annual dilated fundus exam. 8+ years surgical estimate.  -Eyemed    Hyperopia of both eyes with astigmatism and presbyopia  Eyeglass Final Rx       Eyeglass Final Rx         Sphere Cylinder Axis Dist VA Add    Right +0.75 +1.25 175 20/30 +2.25    Left +1.75 +1.25 180 20/25 +2.25      Type: PAL    Expiration Date: 2/7/2024                      RTC 1 yr

## 2023-02-09 ENCOUNTER — IMMUNIZATION (OUTPATIENT)
Dept: OBSTETRICS AND GYNECOLOGY | Facility: CLINIC | Age: 63
End: 2023-02-09
Payer: MEDICARE

## 2023-02-09 DIAGNOSIS — Z23 NEED FOR VACCINATION: Primary | ICD-10-CM

## 2023-02-09 DIAGNOSIS — Z00.00 ENCOUNTER FOR MEDICARE ANNUAL WELLNESS EXAM: ICD-10-CM

## 2023-02-09 PROCEDURE — 91312 COVID-19, MRNA, LNP-S, BIVALENT BOOSTER, PF, 30 MCG/0.3 ML DOSE: ICD-10-PCS | Mod: HCNC,S$GLB,, | Performed by: FAMILY MEDICINE

## 2023-02-09 PROCEDURE — 0124A COVID-19, MRNA, LNP-S, BIVALENT BOOSTER, PF, 30 MCG/0.3 ML DOSE: CPT | Mod: HCNC,PBBFAC | Performed by: FAMILY MEDICINE

## 2023-02-09 PROCEDURE — 91312 COVID-19, MRNA, LNP-S, BIVALENT BOOSTER, PF, 30 MCG/0.3 ML DOSE: CPT | Mod: HCNC,S$GLB,, | Performed by: FAMILY MEDICINE

## 2023-03-09 ENCOUNTER — OFFICE VISIT (OUTPATIENT)
Dept: FAMILY MEDICINE | Facility: CLINIC | Age: 63
End: 2023-03-09
Payer: MEDICARE

## 2023-03-09 ENCOUNTER — LAB VISIT (OUTPATIENT)
Dept: LAB | Facility: HOSPITAL | Age: 63
End: 2023-03-09
Attending: INTERNAL MEDICINE
Payer: MEDICARE

## 2023-03-09 VITALS
WEIGHT: 295.63 LBS | HEART RATE: 80 BPM | HEIGHT: 70 IN | DIASTOLIC BLOOD PRESSURE: 78 MMHG | SYSTOLIC BLOOD PRESSURE: 124 MMHG | TEMPERATURE: 98 F | OXYGEN SATURATION: 95 % | BODY MASS INDEX: 42.32 KG/M2

## 2023-03-09 DIAGNOSIS — R73.9 HYPERGLYCEMIA: ICD-10-CM

## 2023-03-09 DIAGNOSIS — I49.5 TACHY-BRADY SYNDROME: ICD-10-CM

## 2023-03-09 DIAGNOSIS — R53.83 FATIGUE, UNSPECIFIED TYPE: ICD-10-CM

## 2023-03-09 DIAGNOSIS — F41.9 ANXIETY: ICD-10-CM

## 2023-03-09 DIAGNOSIS — Z12.5 SCREENING FOR PROSTATE CANCER: ICD-10-CM

## 2023-03-09 DIAGNOSIS — G47.33 OSA (OBSTRUCTIVE SLEEP APNEA): ICD-10-CM

## 2023-03-09 DIAGNOSIS — Z86.73 PERSONAL HISTORY OF TIA (TRANSIENT ISCHEMIC ATTACK): ICD-10-CM

## 2023-03-09 DIAGNOSIS — R55 VASOVAGAL SYNCOPE: Chronic | ICD-10-CM

## 2023-03-09 DIAGNOSIS — Z12.11 SCREENING FOR COLORECTAL CANCER: ICD-10-CM

## 2023-03-09 DIAGNOSIS — N40.0 BENIGN PROSTATIC HYPERPLASIA, UNSPECIFIED WHETHER LOWER URINARY TRACT SYMPTOMS PRESENT: ICD-10-CM

## 2023-03-09 DIAGNOSIS — E66.01 MORBID OBESITY WITH BMI OF 40.0-44.9, ADULT: ICD-10-CM

## 2023-03-09 DIAGNOSIS — E78.2 MIXED HYPERLIPIDEMIA: ICD-10-CM

## 2023-03-09 DIAGNOSIS — I47.20 VT (VENTRICULAR TACHYCARDIA): ICD-10-CM

## 2023-03-09 DIAGNOSIS — Z00.00 ROUTINE MEDICAL EXAM: Primary | ICD-10-CM

## 2023-03-09 DIAGNOSIS — Z95.818 STATUS POST PLACEMENT OF IMPLANTABLE LOOP RECORDER: ICD-10-CM

## 2023-03-09 DIAGNOSIS — E78.00 PURE HYPERCHOLESTEROLEMIA: ICD-10-CM

## 2023-03-09 DIAGNOSIS — I10 ESSENTIAL HYPERTENSION: ICD-10-CM

## 2023-03-09 DIAGNOSIS — R68.2 DRY MOUTH: ICD-10-CM

## 2023-03-09 DIAGNOSIS — F17.200 TOBACCO DEPENDENCE: Chronic | ICD-10-CM

## 2023-03-09 DIAGNOSIS — R73.03 PREDIABETES: ICD-10-CM

## 2023-03-09 DIAGNOSIS — R55 SYNCOPE AND COLLAPSE: ICD-10-CM

## 2023-03-09 DIAGNOSIS — R00.1 BRADYCARDIA: Chronic | ICD-10-CM

## 2023-03-09 DIAGNOSIS — Z12.12 SCREENING FOR COLORECTAL CANCER: ICD-10-CM

## 2023-03-09 DIAGNOSIS — Z00.00 ROUTINE MEDICAL EXAM: ICD-10-CM

## 2023-03-09 LAB
ALBUMIN SERPL BCP-MCNC: 3.9 G/DL (ref 3.5–5.2)
ALP SERPL-CCNC: 93 U/L (ref 55–135)
ALT SERPL W/O P-5'-P-CCNC: 46 U/L (ref 10–44)
ANION GAP SERPL CALC-SCNC: 8 MMOL/L (ref 8–16)
AST SERPL-CCNC: 35 U/L (ref 10–40)
BASOPHILS # BLD AUTO: 0.05 K/UL (ref 0–0.2)
BASOPHILS NFR BLD: 0.5 % (ref 0–1.9)
BILIRUB SERPL-MCNC: 0.5 MG/DL (ref 0.1–1)
BUN SERPL-MCNC: 13 MG/DL (ref 6–20)
CALCIUM SERPL-MCNC: 9.8 MG/DL (ref 8.7–10.5)
CHLORIDE SERPL-SCNC: 103 MMOL/L (ref 95–110)
CHOLEST SERPL-MCNC: 148 MG/DL (ref 120–199)
CHOLEST/HDLC SERPL: 2.6 {RATIO} (ref 2–5)
CO2 SERPL-SCNC: 28 MMOL/L (ref 23–29)
COMPLEXED PSA SERPL-MCNC: 0.53 NG/ML (ref 0–4)
CREAT SERPL-MCNC: 0.8 MG/DL (ref 0.5–1.4)
DIFFERENTIAL METHOD: ABNORMAL
EOSINOPHIL # BLD AUTO: 0.6 K/UL (ref 0–0.5)
EOSINOPHIL NFR BLD: 6.2 % (ref 0–8)
ERYTHROCYTE [DISTWIDTH] IN BLOOD BY AUTOMATED COUNT: 13.4 % (ref 11.5–14.5)
EST. GFR  (NO RACE VARIABLE): >60 ML/MIN/1.73 M^2
ESTIMATED AVG GLUCOSE: 120 MG/DL (ref 68–131)
GLUCOSE SERPL-MCNC: 95 MG/DL (ref 70–110)
HBA1C MFR BLD: 5.8 % (ref 4–5.6)
HCT VFR BLD AUTO: 47 % (ref 40–54)
HDLC SERPL-MCNC: 56 MG/DL (ref 40–75)
HDLC SERPL: 37.8 % (ref 20–50)
HGB BLD-MCNC: 15.4 G/DL (ref 14–18)
IMM GRANULOCYTES # BLD AUTO: 0.02 K/UL (ref 0–0.04)
IMM GRANULOCYTES NFR BLD AUTO: 0.2 % (ref 0–0.5)
LDLC SERPL CALC-MCNC: 76 MG/DL (ref 63–159)
LYMPHOCYTES # BLD AUTO: 2.5 K/UL (ref 1–4.8)
LYMPHOCYTES NFR BLD: 25.8 % (ref 18–48)
MCH RBC QN AUTO: 30.8 PG (ref 27–31)
MCHC RBC AUTO-ENTMCNC: 32.8 G/DL (ref 32–36)
MCV RBC AUTO: 94 FL (ref 82–98)
MONOCYTES # BLD AUTO: 1.1 K/UL (ref 0.3–1)
MONOCYTES NFR BLD: 10.9 % (ref 4–15)
NEUTROPHILS # BLD AUTO: 5.5 K/UL (ref 1.8–7.7)
NEUTROPHILS NFR BLD: 56.4 % (ref 38–73)
NONHDLC SERPL-MCNC: 92 MG/DL
NRBC BLD-RTO: 0 /100 WBC
PLATELET # BLD AUTO: 238 K/UL (ref 150–450)
PMV BLD AUTO: 12.8 FL (ref 9.2–12.9)
POTASSIUM SERPL-SCNC: 4.2 MMOL/L (ref 3.5–5.1)
PROT SERPL-MCNC: 7.2 G/DL (ref 6–8.4)
RBC # BLD AUTO: 5 M/UL (ref 4.6–6.2)
SODIUM SERPL-SCNC: 139 MMOL/L (ref 136–145)
TRIGL SERPL-MCNC: 80 MG/DL (ref 30–150)
WBC # BLD AUTO: 9.69 K/UL (ref 3.9–12.7)

## 2023-03-09 PROCEDURE — 3078F DIAST BP <80 MM HG: CPT | Mod: HCNC,CPTII,S$GLB, | Performed by: INTERNAL MEDICINE

## 2023-03-09 PROCEDURE — 83036 HEMOGLOBIN GLYCOSYLATED A1C: CPT | Mod: HCNC | Performed by: INTERNAL MEDICINE

## 2023-03-09 PROCEDURE — 84443 ASSAY THYROID STIM HORMONE: CPT | Mod: HCNC | Performed by: INTERNAL MEDICINE

## 2023-03-09 PROCEDURE — 99999 PR PBB SHADOW E&M-EST. PATIENT-LVL III: CPT | Mod: PBBFAC,HCNC,, | Performed by: INTERNAL MEDICINE

## 2023-03-09 PROCEDURE — 99396 PREV VISIT EST AGE 40-64: CPT | Mod: HCNC,S$GLB,, | Performed by: INTERNAL MEDICINE

## 2023-03-09 PROCEDURE — 3078F PR MOST RECENT DIASTOLIC BLOOD PRESSURE < 80 MM HG: ICD-10-PCS | Mod: HCNC,CPTII,S$GLB, | Performed by: INTERNAL MEDICINE

## 2023-03-09 PROCEDURE — 3074F SYST BP LT 130 MM HG: CPT | Mod: HCNC,CPTII,S$GLB, | Performed by: INTERNAL MEDICINE

## 2023-03-09 PROCEDURE — 80053 COMPREHEN METABOLIC PANEL: CPT | Mod: HCNC | Performed by: INTERNAL MEDICINE

## 2023-03-09 PROCEDURE — 3008F PR BODY MASS INDEX (BMI) DOCUMENTED: ICD-10-PCS | Mod: HCNC,CPTII,S$GLB, | Performed by: INTERNAL MEDICINE

## 2023-03-09 PROCEDURE — 99999 PR PBB SHADOW E&M-EST. PATIENT-LVL III: ICD-10-PCS | Mod: PBBFAC,HCNC,, | Performed by: INTERNAL MEDICINE

## 2023-03-09 PROCEDURE — 85025 COMPLETE CBC W/AUTO DIFF WBC: CPT | Mod: HCNC | Performed by: INTERNAL MEDICINE

## 2023-03-09 PROCEDURE — 99396 PR PREVENTIVE VISIT,EST,40-64: ICD-10-PCS | Mod: HCNC,S$GLB,, | Performed by: INTERNAL MEDICINE

## 2023-03-09 PROCEDURE — 3074F PR MOST RECENT SYSTOLIC BLOOD PRESSURE < 130 MM HG: ICD-10-PCS | Mod: HCNC,CPTII,S$GLB, | Performed by: INTERNAL MEDICINE

## 2023-03-09 PROCEDURE — 99214 OFFICE O/P EST MOD 30 MIN: CPT | Mod: 25,HCNC,S$GLB, | Performed by: INTERNAL MEDICINE

## 2023-03-09 PROCEDURE — 3008F BODY MASS INDEX DOCD: CPT | Mod: HCNC,CPTII,S$GLB, | Performed by: INTERNAL MEDICINE

## 2023-03-09 PROCEDURE — 84153 ASSAY OF PSA TOTAL: CPT | Mod: HCNC | Performed by: INTERNAL MEDICINE

## 2023-03-09 PROCEDURE — 80061 LIPID PANEL: CPT | Mod: HCNC | Performed by: INTERNAL MEDICINE

## 2023-03-09 PROCEDURE — 36415 COLL VENOUS BLD VENIPUNCTURE: CPT | Mod: HCNC,PO | Performed by: INTERNAL MEDICINE

## 2023-03-09 PROCEDURE — 99214 PR OFFICE/OUTPT VISIT, EST, LEVL IV, 30-39 MIN: ICD-10-PCS | Mod: 25,HCNC,S$GLB, | Performed by: INTERNAL MEDICINE

## 2023-03-09 RX ORDER — HYDROXYZINE PAMOATE 25 MG/1
25 CAPSULE ORAL 2 TIMES DAILY PRN
Qty: 60 CAPSULE | Refills: 12 | Status: SHIPPED | OUTPATIENT
Start: 2023-03-09

## 2023-03-09 RX ORDER — METOPROLOL TARTRATE 50 MG/1
2 TABLET ORAL
COMMUNITY

## 2023-03-09 RX ORDER — MUPIROCIN 20 MG/G
1 OINTMENT TOPICAL
COMMUNITY

## 2023-03-09 RX ORDER — BUDESONIDE 0.25 MG/2ML
INHALANT ORAL
COMMUNITY

## 2023-03-09 RX ORDER — METOPROLOL SUCCINATE 25 MG/1
TABLET, EXTENDED RELEASE ORAL
COMMUNITY
Start: 2023-02-13

## 2023-03-09 RX ORDER — METOPROLOL TARTRATE 50 MG/1
TABLET ORAL
COMMUNITY
Start: 2023-03-02

## 2023-03-09 RX ORDER — CHLORTHALIDONE 50 MG/1
25 TABLET ORAL
COMMUNITY
Start: 2023-02-09

## 2023-03-09 RX ORDER — METOPROLOL SUCCINATE 25 MG/1
25 TABLET, EXTENDED RELEASE ORAL
COMMUNITY
Start: 2023-01-30
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
## 2023-03-10 LAB — TSH SERPL DL<=0.005 MIU/L-ACNC: 1.2 UIU/ML (ref 0.4–4)

## 2023-04-21 NOTE — PROGRESS NOTES
[facility]   Physical Therapy Treatment Note     Name: Black Preciado  Clinic Number: [id number]    Therapy Diagnosis:   Encounter Diagnoses   Name Primary?    Knee stiffness, left Yes    Gait abnormality     Weakness of lower extremity, unspecified laterality      Physician: Noman Jeffery, *    Visit Date: 3/30/2022    Physician Orders: PT Eval and Treat   Medical Diagnosis from Referral: M17.12 (ICD-10-CM) - Primary osteoarthritis of left knee   M21.162 (ICD-10-CM) - Acquired genu varum, left   M25.561,M25.562,G89.29 (ICD-10-CM) - Chronic pain of both knees  Evaluation Date: 3/21/2022  Authorization Period Expiration: 2022  Plan of Care Expiration: 2022  Visit # / Visits authorized:   FOTO: 2/10    PTA visit : 3/5     Time In: 10:00 am  Time Out: 11:10 am  Total treatment time : 70 minutes   Total Billable Time: 60 minutes     Precautions: Standard, hx of syncope     SUBJECTIVE     Pt reports: had a lot of pain yesterday and is feeling tight today   He was compliant with home exercise program.  Response to previous treatment: increased muscular soreness  Functional change: decreased knee mobility, increased swelling     Pain: 6/10  Location: left knee    OBJECTIVE     Objective Measures updated at progress report unless specified.     3/25/2022: L knee AAROM : 0 - 0 - 85 degrees AAROM     3/28/2022: Pt presents amb c/ SPC                    L knee AAROM : 0-0-86   3/30/2022: STS: 5 no AD                    TU seconds                      L knee : 0-0-86 AAROM    Treatment     Black received the treatments listed below:      Black received therapeutic exercises to develop strength, endurance, ROM, flexibility and posture for 55 minutes including:    Heel prop on  foam: 3' min  Quad sets w/ half bolster under knee: 3 x 10 reps ; 5 sec hold   Quad sets w/ half bolster under ankle: 3 x 10 reps; 5 sec hold   Short arc quads medium bolster: 3 sets x 10  reps; 3 sec hold   Straight leg raise c/ quad set : 3 x 5 reps   Long arc quads: 3 sets x 10 reps 2#  Heel slides w/ green strap; 3 sec hold 20x  Sit to stands : x 10 reps   Step ups 4' step : 2 x 10 reps   Gait training for 100' with SPC : SBA and cues for heel toe gait pattern and cane sequencing       MANUAL THERAPY TECHNIQUES including Joint mobilizations and Soft tissue Mobilization were applied to left knee for 0 minutes:   -Seated at edge of mat; knee flexion      Pt received CP for 10' to L knee with LE elevated for pain relief .     Patient Education and Home Exercises     Home Exercises Provided and Patient Education Provided     Education provided:   - HEP given at initial visit   - Importance of bending knee at home and not only performing extension exercises.    Written Home Exercises Provided: yes. Exercises were reviewed and Black was able to demonstrate them prior to the end of the session.  Black demonstrated good  understanding of the education provided. See EMR under Patient Instructions for exercises provided during therapy sessions    ASSESSMENT     Pt presents s/p 12 days TKA.  Pt continues to exhibit significant swelling likely limiting his knee flexion ROM and tolerance . Pt was educated on importance of rest , ice and elevation as he admits to over doing it at home .  Pt exhibits 0 degrees of terminal knee extension and exhibits good quadriceps activation .  Will continue to progress next per TKA protocol .     Black Is progressing well towards his goals.   Pt prognosis is Excellent.     Pt will continue to benefit from skilled outpatient physical therapy to address the deficits listed in the problem list box on initial evaluation, provide pt/family education and to maximize pt's level of independence in the home and community environment.   Pt's spiritual, cultural and educational needs considered and pt agreeable to plan of care and goals.     Anticipated barriers to physical  therapy: None to note     Short Term Goals (3 Weeks):   1. Patient will be independent with home exercise program to supplement physical therapy treatment in improving functional status.  2. Patient will improve (L) lower extremity strength to at least 75% of (R) lower extremity strength as measured via MicroFet handheld dynamometer to improve strength for closed chain tasks.   3. Patient will improve (L) knee active range of motion to 0-90 degrees to promote increased ease of sit<>stand transfers.  4. Patient will perform timed up and go with less restrictive assistive device in < 20 seconds to improve gait speed and safety with community ambulation.     Long Term Goals (6 Weeks):   1. Patient will improve (L) lower extremity strength to at least 90% of (R) lower extremity strength as measured via MicroFet handheld dynamometer to improve strength for closed chain tasks.   2. Patient will improve the total FOTO Knee Survey Score to </= 30% limited to demonstrate increased perceived functional mobility.  3. Patient will improve score on KOOS Jr. section of FOTO Knee Survey to = </= 70% to demonstrate increased perceived functional mobility.  4. Patient will perform timed up and go with least restrictive assistive device in < 13.5 seconds to improve gait speed and safety with community ambulation.  5. Patient will perform at least 17 sit to stands in 30 seconds without UE support to demonstrate increased functional strength.   6. Patient will improve (L) knee active range of motion to 0-120 degrees to promote higher level transfers and transitions without limitation.     PLAN   Improve active extension and flexion then progress to strengthening.     Melissa Nix, PTA     Oculoplastic Surgeon Procedure Text (A): After obtaining clear surgical margins the patient was sent to oculoplastics for surgical repair.  The patient understands they will receive post-surgical care and follow-up from the referring physician's office.

## 2023-05-18 ENCOUNTER — PES CALL (OUTPATIENT)
Dept: ADMINISTRATIVE | Facility: CLINIC | Age: 63
End: 2023-05-18
Payer: MEDICARE

## 2023-06-26 NOTE — TELEPHONE ENCOUNTER
No care due was identified.  [facility] Embedded Care Due Messages. Reference number: [id number].   6/26/2023 4:35:46 PM CDT

## 2023-06-27 RX ORDER — TAMSULOSIN HYDROCHLORIDE 0.4 MG/1
0.4 CAPSULE ORAL DAILY
Qty: 90 CAPSULE | Refills: 12 | Status: SHIPPED | OUTPATIENT
Start: 2023-06-27 | End: 2026-09-09

## 2023-09-11 DIAGNOSIS — M17.12 PRIMARY OSTEOARTHRITIS OF LEFT KNEE: ICD-10-CM

## 2023-09-11 DIAGNOSIS — M21.162 GENU VARUM, ACQUIRED, LEFT: ICD-10-CM

## 2023-09-12 RX ORDER — CELECOXIB 200 MG/1
200 CAPSULE ORAL 2 TIMES DAILY
Qty: 180 CAPSULE | Refills: 3 | Status: SHIPPED | OUTPATIENT
Start: 2023-09-12

## 2023-12-22 ENCOUNTER — PATIENT OUTREACH (OUTPATIENT)
Dept: ADMINISTRATIVE | Facility: HOSPITAL | Age: 63
End: 2023-12-22
Payer: MEDICARE

## 2023-12-22 ENCOUNTER — PATIENT MESSAGE (OUTPATIENT)
Dept: ADMINISTRATIVE | Facility: HOSPITAL | Age: 63
End: 2023-12-22
Payer: MEDICARE

## 2024-02-14 ENCOUNTER — PATIENT OUTREACH (OUTPATIENT)
Dept: ADMINISTRATIVE | Facility: HOSPITAL | Age: 64
End: 2024-02-14
Payer: MEDICARE

## 2024-03-21 ENCOUNTER — TELEPHONE (OUTPATIENT)
Dept: FAMILY MEDICINE | Facility: CLINIC | Age: 64
End: 2024-03-21
Payer: MEDICARE

## 2024-03-21 NOTE — TELEPHONE ENCOUNTER
Called the patient and his wife to confirm the patient's appointment on 03/22/24. Unable to leave a message due to neither having a voicemail set-up.

## 2024-03-22 PROBLEM — J43.9 PULMONARY EMPHYSEMA, UNSPECIFIED EMPHYSEMA TYPE: Status: ACTIVE | Noted: 2024-03-22

## 2024-05-07 ENCOUNTER — PATIENT OUTREACH (OUTPATIENT)
Dept: ADMINISTRATIVE | Facility: HOSPITAL | Age: 64
End: 2024-05-07
Payer: MEDICARE

## 2024-05-07 DIAGNOSIS — Z12.12 ENCOUNTER FOR SCREENING FOR COLORECTAL MALIGNANT NEOPLASM: Primary | ICD-10-CM

## 2024-05-07 DIAGNOSIS — Z12.11 ENCOUNTER FOR SCREENING FOR COLORECTAL MALIGNANT NEOPLASM: Primary | ICD-10-CM

## 2024-05-14 ENCOUNTER — TELEPHONE (OUTPATIENT)
Dept: ENDOSCOPY | Facility: HOSPITAL | Age: 64
End: 2024-05-14

## 2024-05-14 ENCOUNTER — PATIENT MESSAGE (OUTPATIENT)
Dept: ENDOSCOPY | Facility: HOSPITAL | Age: 64
End: 2024-05-14

## 2024-05-14 NOTE — TELEPHONE ENCOUNTER
Attempted to contact patient to schedule colonoscopy. The patient did not answer the call. Unable to leave voice message. Portal message sent requesting a call back at [phone] to get procedure scheduled.

## 2024-06-11 ENCOUNTER — PATIENT OUTREACH (OUTPATIENT)
Dept: ADMINISTRATIVE | Facility: HOSPITAL | Age: 64
End: 2024-06-11
Payer: MEDICARE

## 2024-07-02 ENCOUNTER — OFFICE VISIT (OUTPATIENT)
Dept: OPTOMETRY | Facility: CLINIC | Age: 64
End: 2024-07-02
Payer: MEDICARE

## 2024-07-02 DIAGNOSIS — Z01.00 EYE EXAM, ROUTINE: Primary | ICD-10-CM

## 2024-07-02 DIAGNOSIS — H52.203 HYPEROPIA OF BOTH EYES WITH ASTIGMATISM AND PRESBYOPIA: ICD-10-CM

## 2024-07-02 DIAGNOSIS — H52.03 HYPEROPIA OF BOTH EYES WITH ASTIGMATISM AND PRESBYOPIA: ICD-10-CM

## 2024-07-02 DIAGNOSIS — H52.4 HYPEROPIA OF BOTH EYES WITH ASTIGMATISM AND PRESBYOPIA: ICD-10-CM

## 2024-07-02 PROCEDURE — 92015 DETERMINE REFRACTIVE STATE: CPT | Mod: HCNC,S$GLB,, | Performed by: OPTOMETRIST

## 2024-07-02 PROCEDURE — 1159F MED LIST DOCD IN RCRD: CPT | Mod: HCNC,CPTII,S$GLB, | Performed by: OPTOMETRIST

## 2024-07-02 PROCEDURE — 92014 COMPRE OPH EXAM EST PT 1/>: CPT | Mod: HCNC,S$GLB,, | Performed by: OPTOMETRIST

## 2024-07-02 PROCEDURE — 99999 PR PBB SHADOW E&M-EST. PATIENT-LVL II: CPT | Mod: PBBFAC,HCNC,, | Performed by: OPTOMETRIST

## 2024-07-02 NOTE — PROGRESS NOTES
HPI    63 y.o. is here for annual eye exam  Pt states wear pal gl most of the time  Pt c/o mostly of extreme dry eye sates his eye's are always watery   Pt want to get a new rx for gl, Od is blurry      (-)Flashes (+)Floaters Occassionally  (+)Itch, (+)tear, (-)burn, (+)Dryness.  (-) OTC Drops  (-)Photophobia(-)Glare   (-) Headaches (-) Eye Pain (-) Double vision     Eye Medications: None     No Past Ocular history      Family Ocular history   NO Family history of glaucoma previous history was documented incorrectly       Last edited by Manisha Snyder on 7/2/2024 12:48 PM.            Assessment /Plan     For exam results, see Encounter Report.    Eye exam, routine  -[facility]    Hyperopia of both eyes with astigmatism and presbyopia  Eyeglass Final Rx       Eyeglass Final Rx         Sphere Cylinder Axis Dist VA Add    Right [place] +1.25 175 20/30 +2.25    Left +2.00 +1.25 180 20/25 +2.25      Type: PAL    Expiration Date: 7/2/2025                    Sample Systane Complete      RTC 1 yr

## 2024-07-09 NOTE — TELEPHONE ENCOUNTER
Care Due:                  Date            Visit Type   Department     Provider  --------------------------------------------------------------------------------                                MYCHART                              ANNUAL       [facility]                              CHECKUP/PHY  MED/ INTERNAL  Manpreet Lopez  Last Visit: 03-      S            MED/ PEDS      Ehrensing  Next Visit: None Scheduled  None         None Found                                                            Last  Test          Frequency    Reason                     Performed    Due Date  --------------------------------------------------------------------------------    Office Visit  15 months..  tamsulosin...............  03- 06-    [facility] Embedded Care Due Messages. Reference number: [id number].   7/09/2024 11:46:28 AM CDT

## 2024-07-10 LAB — CRC RECOMMENDATION EXT: NORMAL

## 2024-07-10 RX ORDER — TAMSULOSIN HYDROCHLORIDE 0.4 MG/1
1 CAPSULE ORAL
Qty: 90 CAPSULE | Refills: 3 | Status: SHIPPED | OUTPATIENT
Start: 2024-07-10

## 2024-07-16 ENCOUNTER — PATIENT OUTREACH (OUTPATIENT)
Dept: ADMINISTRATIVE | Facility: HOSPITAL | Age: 64
End: 2024-07-16
Payer: MEDICARE

## 2024-07-18 ENCOUNTER — OFFICE VISIT (OUTPATIENT)
Dept: FAMILY MEDICINE | Facility: CLINIC | Age: 64
End: 2024-07-18
Payer: MEDICARE

## 2024-07-18 VITALS
HEART RATE: 63 BPM | DIASTOLIC BLOOD PRESSURE: 72 MMHG | TEMPERATURE: 98 F | BODY MASS INDEX: 44.95 KG/M2 | SYSTOLIC BLOOD PRESSURE: 126 MMHG | WEIGHT: 313.25 LBS | OXYGEN SATURATION: 94 %

## 2024-07-18 DIAGNOSIS — E78.2 MIXED HYPERLIPIDEMIA: ICD-10-CM

## 2024-07-18 DIAGNOSIS — J43.9 PULMONARY EMPHYSEMA, UNSPECIFIED EMPHYSEMA TYPE: ICD-10-CM

## 2024-07-18 DIAGNOSIS — F17.200 TOBACCO DEPENDENCE: Chronic | ICD-10-CM

## 2024-07-18 DIAGNOSIS — Z23 NEED FOR PROPHYLACTIC VACCINATION WITH STREPTOCOCCUS PNEUMONIAE (PNEUMOCOCCUS) AND INFLUENZA VACCINES: ICD-10-CM

## 2024-07-18 DIAGNOSIS — Z00.00 ROUTINE GENERAL MEDICAL EXAMINATION AT A HEALTH CARE FACILITY: Primary | ICD-10-CM

## 2024-07-18 DIAGNOSIS — R73.03 PREDIABETES: ICD-10-CM

## 2024-07-18 DIAGNOSIS — I10 ESSENTIAL HYPERTENSION: ICD-10-CM

## 2024-07-18 DIAGNOSIS — Z11.4 ENCOUNTER FOR SCREENING FOR HIV: ICD-10-CM

## 2024-07-18 DIAGNOSIS — Z12.5 PROSTATE CANCER SCREENING ENCOUNTER, OPTIONS AND RISKS DISCUSSED: ICD-10-CM

## 2024-07-18 PROCEDURE — 3008F BODY MASS INDEX DOCD: CPT | Mod: HCNC,CPTII,S$GLB, | Performed by: NURSE PRACTITIONER

## 2024-07-18 PROCEDURE — 99396 PREV VISIT EST AGE 40-64: CPT | Mod: HCNC,S$GLB,, | Performed by: NURSE PRACTITIONER

## 2024-07-18 PROCEDURE — 1159F MED LIST DOCD IN RCRD: CPT | Mod: HCNC,CPTII,S$GLB, | Performed by: NURSE PRACTITIONER

## 2024-07-18 PROCEDURE — 90677 PCV20 VACCINE IM: CPT | Mod: HCNC,S$GLB,, | Performed by: NURSE PRACTITIONER

## 2024-07-18 PROCEDURE — G0009 ADMIN PNEUMOCOCCAL VACCINE: HCPCS | Mod: HCNC,S$GLB,, | Performed by: NURSE PRACTITIONER

## 2024-07-18 PROCEDURE — 3078F DIAST BP <80 MM HG: CPT | Mod: HCNC,CPTII,S$GLB, | Performed by: NURSE PRACTITIONER

## 2024-07-18 PROCEDURE — 3074F SYST BP LT 130 MM HG: CPT | Mod: HCNC,CPTII,S$GLB, | Performed by: NURSE PRACTITIONER

## 2024-07-18 PROCEDURE — 99999 PR PBB SHADOW E&M-EST. PATIENT-LVL V: CPT | Mod: PBBFAC,HCNC,, | Performed by: NURSE PRACTITIONER

## 2024-07-18 NOTE — PROGRESS NOTES
"  HPI     Black Preciado is a 63 y.o. male with multiple medical diagnoses as listed in the medical history and problem list that presents for   Chief Complaint   Patient presents with    Annual Exam       Hypertension  This is a chronic problem. The current episode started more than 1 year ago. The problem has been resolved since onset. The problem is controlled. Associated symptoms include anxiety, malaise/fatigue and peripheral edema. Pertinent negatives include no blurred vision, chest pain, headaches, neck pain, orthopnea, palpitations, PND, shortness of breath or sweats. There are no associated agents to hypertension. Risk factors for coronary artery disease include dyslipidemia, family history, obesity, sedentary lifestyle and smoking/tobacco exposure. Past treatments include diuretics. The current treatment provides significant improvement. Compliance problems include diet and exercise.      Social Factors  Tobacco use: yes, 5 cigarettes per week.    Alcohol: 6 drinks per week  Intimate partner violence screening  "Do you feel safe in your current relationship?" Yes  "Have you ever been in a relationship in which your partner frightened you or hurt you?" No  Living Will/POA: No   Regular Exercise: yes    Depression  Over the past two weeks, have you felt down, depressed, or hopeless? No  Over the past two weeks, have you felt little interest or pleasure in doing things? No    Reproductive Health  STD screening in last year: declined  HIV screening: reviewed     Screen for Chronic Disease  The 10-year ASCVD risk score (Riley DONALD, et al., 2019) is: 11.7%    Values used to calculate the score:      Age: 63 years      Sex: Male      Is Non- : No      Diabetic: No      Tobacco smoker: Yes      Systolic Blood Pressure: 126 mmHg      Is BP treated: Yes      HDL Cholesterol: 56 mg/dL      Total Cholesterol: 148 mg/dL     Dyslipidemia screening needed: reviewed   T2DM screening needed: " reviewed   Colonoscopy needed: reviewed   PSA needed: reviewed   AAA screening needed:reviewed     CHD Risk Factors: advanced age (older than 55 for men, 65 for women), dyslipidemia, hypertension, male gender, and obesity (BMI >= 30 kg/m2)    Screen men 35 years and older, and men 20 to 34 years of age who have cardiovascular risk factors for dyslipidemia  Begin screening colonoscopies at 50 years of age in men of average risk, and continue until 75 years of age; offer fecal occult blood testing every year, flexible sigmoidoscopy every five years combined with fecal occult blood testing every three years, or colonoscopy every 10 years   The American Urological Association recommends offering PSA testing and digital rectal examination to well-informed men beginning at 40 years of age and continuing until life expectancy is less than 10 years  Screen once with ultrasonography in men 65 to 75 years of age if they have a family history or have smoked at [id number] cigarettes in their lifetime  Screen men with a sustained blood pressure greater than 135/80 mm Hg for T2DM    Immunizations  Reviewed        Assessment & Plan     Problem List Items Addressed This Visit          Pulmonary    Pulmonary emphysema, unspecified emphysema type    The current medical regimen is effective;  continue present plan         Cardiac/Vascular    Essential hypertension    BP Readings from Last 3 Encounters:   07/18/24 126/72   03/09/23 124/78   10/03/22 117/70       -continue current medication regimen  -DASH diet, regular cardiovascular exercises, portion control  -weight loss  -f/u with BP logs in 2 weeks       Relevant Orders    CBC Auto Differential    Comprehensive Metabolic Panel    Lipid Panel    Hemoglobin A1C    TSH    T4, Free    PSA, Screening    HIV 1/2 Ag/Ab (4th Gen)    Mixed hyperlipidemia    discussed ways to lower triglycerides such as cutting simple sugars out of diet (white breads, candies, cookies, cakes, etc.) and  reducing/eliminating intake of highly processed trans fatty acids.   Exercise 30 minutes a day for 4-5 days a week.   Eat more fiber.    Check lipid panel    Relevant Orders    CBC Auto Differential    Comprehensive Metabolic Panel    Lipid Panel    Hemoglobin A1C    TSH    T4, Free    PSA, Screening    HIV 1/2 Ag/Ab (4th Gen)       Endocrine    BMI 40.0-44.9, adult    We discussed weight issues and safe, effective ways of losing pounds, includin) diet:  low carbohydrate, low fat diet, stay away from fast food, fried and processed food, use whole grain, lot of fruits and vegetables, use healthy fat such as avocado, nuts and olive oil in reasonable quantity, stay away from sodas. Regular meals with lean proteins.  2) physical activity: ideally 150 min a week, with cardiovascular and resistance activity.  Patient was encouraged to set realistic attainable goals for weight loss, and we will follow up periodically.    Discussed Mediterranean Diet recommendations (Adopted from Zonia et al, [facility] ([place] 2018.)  - Eat primarily plant-based foods, such as fruits and vegetables, whole grains, legumes (beans) and nuts  - Limit refined carbohydrates (white pasta, bread, rice).  - Replace butter with healthy fats such as olive oil.  - Use herbs and spices instead of salt to flavor foods.  - Limit red meat and processed meats to no more than a few times a month.  - Avoid sugary sodas, bakery goods, and sweets.  - Eat fish and poultry at least twice a week.    Refer to bariatrics    Relevant Orders    CBC Auto Differential    Comprehensive Metabolic Panel    Lipid Panel    Hemoglobin A1C    TSH    T4, Free    PSA, Screening    HIV 1/2 Ag/Ab (4th Gen)    Prediabetes    Patient does have a Hx of prediabtes, which we discussed increased risk for diabetes in the future, therefore, I recommend dietary modification such as lowering carbohydrates in diet (pasta, rice, bread, potatoes, crackers, cookies, candy, soda, etc.) to decrease  the risk of progression to diabetes.      Check A1c    Relevant Orders    CBC Auto Differential    Comprehensive Metabolic Panel    Lipid Panel    Hemoglobin A1C    TSH    T4, Free    PSA, Screening    HIV 1/2 Ag/Ab (4th Gen)       Other    Tobacco dependence (Chronic)    -Counseled patient to quit tobacco usage, and advised on several options to quit and the benefits of quitting, which include but are not limited to: decreasing the risk of cancer, HTN, CVD, respiratory complications, among other diseases.  -5 minutes spent discussing cessation.   -pt is interested in quitting.     Refer to cessation    Overview     Around 30 pack year history         Relevant Orders    Ambulatory referral/consult to Smoking Cessation Program     Other Visit Diagnoses       Routine general medical examination at a health care facility    -  Primary    Counseled on age appropriate medical preventative services including age appropriate cancer screenings, age appropriate eye and dental exams, over all nutritional health, need for a consistent exercise regimen, and an over all push towards maintaining a vigorous and active lifestyle.  Counseled on age appropriate vaccines and discussed upcoming health care needs based on age/gender. Discussed good sleep hygiene and stress management.    Annual labs    Relevant Orders    CBC Auto Differential    Comprehensive Metabolic Panel    Lipid Panel    Hemoglobin A1C    TSH    T4, Free    PSA, Screening    HIV 1/2 Ag/Ab (4th Gen)    Encounter for screening for HIV        Relevant Orders    HIV 1/2 Ag/Ab (4th Gen)    Prostate cancer screening encounter, options and risks discussed        Relevant Orders    PSA, Screening    Need for prophylactic vaccination with Streptococcus pneumoniae (Pneumococcus) and Influenza vaccines        Relevant Orders    (In Office Administered) Pneumococcal Conjugate Vaccine (20 Valent) (IM) (Preferred)               --------------------------------------------      Health Maintenance:  Health Maintenance         Date Due Completion Date    HIV Screening Never done ---    TETANUS VACCINE Never done ---    Pneumococcal Vaccines (Age 0-64) (2 of 2 - PCV) 07/18/2017 7/18/2016    RSV Vaccine (Age 60+ and Pregnant patients) (1 - 1-dose 60+ series) Never done ---    High Dose Statin 10/26/2022 10/26/2021    Influenza Vaccine (1) 09/01/2024 11/28/2023    Colorectal Cancer Screening 07/10/2029 7/10/2024            Pneumonia vaccine ordered, HIV screening ordered, Lipid panel ordered, A1c ordered, and PSA screening ordered    Follow Up:  Follow up in about 3 months (around 10/18/2024).    Exam     Review of Systems:  (as noted above)  Review of Systems   Constitutional:  Positive for malaise/fatigue. Negative for fever.   HENT:  Negative for trouble swallowing.    Eyes:  Negative for blurred vision and visual disturbance.   Respiratory:  Negative for chest tightness and shortness of breath.    Cardiovascular:  Negative for chest pain, palpitations, orthopnea and PND.   Gastrointestinal:  Negative for blood in stool.   Musculoskeletal:  Negative for neck pain.   Neurological:  Negative for headaches.       Physical Exam:   Physical Exam  Constitutional:       General: He is not in acute distress.     Appearance: He is obese. He is not ill-appearing or diaphoretic.   HENT:      Head: Normocephalic and atraumatic.   Eyes:      General: No scleral icterus.  Cardiovascular:      Rate and Rhythm: Normal rate and regular rhythm.   Pulmonary:      Effort: No respiratory distress.   Chest:      Chest wall: No tenderness.   Neurological:      General: No focal deficit present.      Mental Status: He is alert and oriented to person, place, and time.       Vitals:    07/18/24 1008   BP: 126/72   BP Location: Right arm   Patient Position: Sitting   BP Method: Large (Manual)   Pulse: 63   Temp: 98.4 °F (36.9 °C)   TempSrc: Oral   SpO2: (!) 94%    Weight: (!) 142.1 kg (313 lb 4.4 oz)      Body mass index is 44.95 kg/m².        History     Past Medical History:  Past Medical History:   Diagnosis Date    Hyperlipidemia     Hypertension     Major depression, single episode 2013    Morbid obesity with BMI of 40.0-44.9, adult 2016    AUREA (obstructive sleep apnea)     Primary osteoarthritis of left knee 2016    Pulmonary emphysema, unspecified emphysema type 3/22/2024    Screening for colorectal cancer 2018    Normal 2014 -10 yrs    Sinus tanja-tachy syndrome 7/15/2015    Syncope and collapse     vasovagal -sees EP Cards       Past Surgical History:  Past Surgical History:   Procedure Laterality Date    INSERTION OF PACEMAKER  2020    KNEE ARTHROPLASTY Left 3/18/2022    Procedure: ARTHROPLASTY, KNEE;  Surgeon: Estela Trevino MD;  Location: [facility] OR [address] [place] [address];  Service: Orthopedics;  Laterality: Left;    knee scope      REMOVAL OF IMPLANTABLE LOOP RECORDER N/A 3/2/2020    Procedure: REMOVAL, IMPLANTABLE LOOP RECORDER;  Surgeon: Cy Villeda MD;  Location: [facility] EP LAB;  Service: Cardiology;  Laterality: N/A;  EOC, ILR Removal, RN Sedation, DM, 3 Prep    SINUS SURGERY  2022       Social History:  Social History     Socioeconomic History    Marital status:    Tobacco Use    Smoking status: Some Days     Current packs/day: 0.00     Types: Cigarettes     Last attempt to quit: 10/1/2021     Years since quittin.7    Smokeless tobacco: Never    Tobacco comments:     Only maybe 4 or less cigarettes per week.   Substance and Sexual Activity    Alcohol use: Yes     Alcohol/week: 6.0 standard drinks of alcohol     Types: 6 Cans of beer per week     Comment: socially    Drug use: Yes     Types: Marijuana     Comment: last used on 3/17/22     Social Determinants of Health     Financial Resource Strain: Low Risk  (7/15/2024)    Overall Financial Resource Strain (CARDIA)     Difficulty of Paying Living Expenses: Not hard at all   Food  Insecurity: No Food Insecurity (7/15/2024)    Hunger Vital Sign     Worried About Running Out of Food in the Last Year: Never true     Ran Out of Food in the Last Year: Never true   Transportation Needs: No Transportation Needs (1/24/2022)    PRAPARE - Transportation     Lack of Transportation (Medical): No     Lack of Transportation (Non-Medical): No   Physical Activity: Inactive (7/15/2024)    Exercise Vital Sign     Days of Exercise per Week: 0 days     Minutes of Exercise per Session: 0 min   Stress: Stress Concern Present (7/15/2024)    [facility] [place] [facility] [employer] - Occupational Stress Questionnaire     Feeling of Stress : To some extent   Housing Stability: Low Risk  (1/24/2022)    Housing Stability Vital Sign     Unable to Pay for Housing in the Last Year: No     Number of Places Lived in the Last Year: 1     Unstable Housing in the Last Year: No       Family History:  Family History   Problem Relation Name Age of Onset    Hypertension Mother      Diabetes Mother      Heart disease Mother      Heart disease Father      Cataracts Father      No Known Problems Sister      No Known Problems Maternal Aunt      No Known Problems Maternal Uncle      No Known Problems Paternal Aunt      No Known Problems Paternal Uncle      No Known Problems Maternal Grandmother      No Known Problems Maternal Grandfather      No Known Problems Paternal Grandmother      No Known Problems Paternal Grandfather      No Known Problems Daughter      No Known Problems Son      No Known Problems Sister      Amblyopia Neg Hx      Blindness Neg Hx      Cancer Neg Hx      Glaucoma Neg Hx      Macular degeneration Neg Hx      Retinal detachment Neg Hx      Strabismus Neg Hx      Stroke Neg Hx      Thyroid disease Neg Hx         Allergies and Medications: (updated and reviewed)  Review of patient's allergies indicates:   Allergen Reactions    Penicillins Other (See Comments)     Unknown reaction as a baby     Current Outpatient  Medications   Medication Sig Dispense Refill    aspirin (ECOTRIN) 81 MG EC tablet Take 81 mg by mouth once daily.      atorvastatin (LIPITOR) 40 MG tablet Take 80 mg by mouth once daily.      celecoxib (CELEBREX) 200 MG capsule TAKE 1 CAPSULE TWICE DAILY 180 capsule 3    chlorthalidone (HYGROTEN) 50 MG Tab Take 25 mg by mouth.      EPINEPHrine (EPIPEN) 0.3 mg/0.3 mL AtIn use as directed; 2/22/22 states for cat allergy-never used      fish oil-omega-3 fatty acids 300-1,000 mg capsule Take 1 capsule by mouth once daily.      hydrOXYzine pamoate (VISTARIL) 25 MG Cap Take 1 capsule (25 mg total) by mouth 2 (two) times daily as needed (anxiety). 60 capsule 12    metoprolol succinate (TOPROL-XL) 25 MG 24 hr tablet Take 25 mg by mouth.      metoprolol tartrate (LOPRESSOR) 50 MG tablet Take by mouth.      multivitamin (THERAGRAN) per tablet Take 1 tablet by mouth once daily.      rivaroxaban (XARELTO) 20 mg Tab Take 20 mg by mouth.      tamsulosin (FLOMAX) 0.4 mg Cap TAKE 1 CAPSULE ONE TIME DAILY 90 capsule 3    azelastine (ASTELIN) 137 mcg (0.1 %) nasal spray by Nasal route 2 (two) times daily as needed.      budesonide (PULMICORT) 0.25 mg/2 mL nebulizer solution 2 mL Inhalation Twice a day      fluticasone propionate (FLONASE) 50 mcg/actuation nasal spray by Each Nostril route once daily.      metoprolol succinate (TOPROL-XL) 25 MG 24 hr tablet Take by mouth.      metoprolol tartrate (LOPRESSOR) 50 MG tablet Take 2 tablets by mouth. Take 2 tablets by mouth the night before your test. Take 1 tablet by mouth one hour before your test.      mupirocin (BACTROBAN) 2 % ointment 1 application.       No current facility-administered medications for this visit.     Facility-Administered Medications Ordered in Other Visits   Medication Dose Route Frequency Provider Last Rate Last Admin    0.9%  NaCl infusion   Intravenous Continuous Aga Valencia, NP        vancomycin in dextrose 5 % 1 gram/250 mL IVPB 1,000 mg  1,000 mg  Intravenous On Call Procedure Aga Valencia NP   1,500 mg at 03/18/22 [id number]       Patient Care Team:  Manpreet Brewer MD as PCP - General (Internal Medicine)  Racheal Jj MA as Care Coordinator  [facility] (Gastroenterology)         - The patient is given an After Visit Summary that lists all medications with directions, allergies, education, orders placed during this encounter and follow-up instructions.      - I have reviewed the patient's medical information including past medical, family, and social history sections including the medications and allergies.      - We discussed the patient's current medications.     This note was created by combination of typed  and MModal dictation.  Transcription errors may be present.  If there are any questions, please contact me.       Pankaj Argueta NP

## 2024-07-18 NOTE — PATIENT INSTRUCTIONS
"[facility]--[phone]  Imaging, Xray, CT, MRI, Ultrasound---[phone]  Bariatrics---[phone]  Breast Surgery---[phone]  Case Management---[phone]  Colonoscopy---[phone]  DME---[phone]  Infectious Disease---[phone]  Interventional Radiology---[phone]  Medical Records---[phone]  [facility]---[phone]  Optometry/Ophthalmology---[phone]  [facility]---[phone]  Physical Therapy---[phone]  Psychiatry---[phone] or [phone]  Plastic Surgery---[phone]  Recovery--[phone] option 2, or [phone].  Sleep Study---[phone]  Smoking Cessation---[phone]  Wound Care---[phone]  Referral Desk---[phone]  Patient Education       Weight Loss Diet   About this topic   There are many "trendy" weight loss diets that are popular today. Many of these diets can end up being more harmful than helpful. The healthiest way to lose weight is to burn more calories than you eat.  A weight loss diet should help you have a healthy view of eating. It is NOT healthy to stop eating to try and lose weight. A good diet plan will help you cut down your food intake and make healthy choices.  A healthy weight loss goal is 1 to 2 pounds (0.5 to 1 kg) per week. Reducing calories in your diet, burning calories through exercise, or both can help you lose weight. Combining a healthy diet with regular physical activity can help you get the best results.  To cut calories in your diet you can:  Switch from whole milk to 1% or skim milk.  Switch from regular cheese to low-fat or fat-free cheese.  Use healthier condiment choices:  Fat-free or low-fat sour cream or salad dressings  Spray butter  Diet syrups or jellies over regular  Try frozen yogurt as a dessert rather than eating ice cream.  Skip the chips. Snack on carrots, vegetables, or fruit. If chips are a favorite of yours, try the baked style and watch portion size.  Eat grilled, roasted, boiled, broiled, or " baked meats. Avoid deep-frying. Choose skinless poultry, lean red meat, lean cuts of pork, and fish for good protein sources.  Try flavored no-calorie sellers. Do not drink soda and juices that have many calories.  Choose fruit instead of sweets.  General   Eating smaller meals more often may be helpful. This will keep you from overeating at your next meal. Also, eating meals slowly helps you feel full faster.  If eating 3 meals is a part of your lifestyle, choose more lean proteins and higher fiber foods to fill you up at each meal.  Do not skip meals. Most often if you skip a meal, you eat too much at the next meal.  Eat smaller portions. Use a smaller plate or bowl for meals, and when you are eating out, eat half and take the rest home.  Plan ahead. Plan your meals and grocery list before going to the store. Planning will keep you from getting meals from restaurants.  Do not go to the grocery store hungry. You are more likely to buy snacks that are not good for you.  Portion out snacks. When you are having a snack, instead of grabbing the whole bag, portion a small amount out to give yourself a stopping point.  Drink water before and after your meals to help fill you up without the calories.  When eating starchy foods, choose whole-grain products. These have a lot of fiber which will make you feel full. Fiber also helps lower cholesterol and helps with bowel function.  If you need a helpful start, ask your doctor to send you to a dietitian for weight loss help.         What will the results be?   Losing excess weight will make your whole body healthier. You will have more energy for your daily activities and lower your risk for health problems.  What lifestyle changes are needed?   Stay active. Eating healthy is not always enough to lose weight. Burning calories by exercising is a big part of weight loss.  What foods are good to eat?   The key is to watch your portion sizes. It is best to choose foods that are  lower in fat and calories.  Choose lean meats:  Boneless, skinless chicken breast  Pork loin  90% lean beef  Lean turkey meat  Fresh fish (not fried)  Choose low-fat dairy products:  1% or skim milk  Spray butter or margarine  Low-fat or fat-free cheese  Frozen yogurt or low-calorie ice cream  Choose fresh fruits, vegetables, beans and lentils, and whole wheat products more often.  Choose water to drink more often. Drink diet or no-calorie beverages when you want something other than water. Aim to get your calories from the foods you eat.  Choose smart snacks:  Fruits  Vegetables  Low-fat or nonfat yogurt  Low-fat or no-fat cheese, such as cottage cheese  Unsalted nuts  Hard-boiled egg  Hummus  Guacamole  Natural peanut butter  Popcorn with no butter ? use pepper, garlic, or another spice to taste  Whole grain crackers  What foods should be limited or avoided?   Limit high-fat, high-sodium, and high-calorie foods like:  Fried foods  Processed meats  Whole-fat dairy products  Candy, cookies, chips, pastries  Sausage, johnson, any full-fat meats  Soda, juice  Beer, wine, and mixed drinks (alcohol)  Will there be any other care needed?   What do I do first before trying to lose weight?  Talk to your doctor and dietitian to see if you need to lose weight. Work with them to set your weight loss goals.  If you have a chronic illness, such as high blood sugar or high blood pressure, ask a doctor or dietitian what diet and exercise is right for you.  Ask your doctor about how much you are able to exercise and what type of exercise is good for you.  Helpful tips   Keep a food journal to help keep you on track.  Join a support group.  Tips for burning calories:  If your workplace is near your house, choose to walk or bike to work instead of driving.  Take 20-minute walks each day. Walk around during your lunch break. You will not only burn calories, but will raise your energy for the rest of the day.  Take the stairs over the  elevator.  Join a gym or exercise class with a friend.  Try to exercise 30 minutes a day for overall health. Three 10-minute sessions work too. Aim for 60 to 90 minutes a day to lose weight.  Drink lots of water before, during, and after exercise.  Where can I learn more?   Academy of Nutrition and Dietetics  https://www.eatright.org/health/weight-loss/your-health-and-your-weight/back-to-basics-for-healthy-weight-loss   Centers for Disease Control and Prevention  https://www.cdc.gov/healthyweight/healthy_eating/index.html   Familydoctor.org  https://familydoctor.org/nutrition-weight-loss-need-know-fad-diets/   NHS  https://www.nhs.uk/live-well/healthy-weight/12-tips-to-help-you-lose-weight/?tabname=you-and-your-weight   Last Reviewed Date   2021-06-24  Consumer Information Use and Disclaimer   This information is not specific medical advice and does not replace information you receive from your health care provider. This is only a brief summary of general information. It does NOT include all information about conditions, illnesses, injuries, tests, procedures, treatments, therapies, discharge instructions or life-style choices that may apply to you. You must talk with your health care provider for complete information about your health and treatment options. This information should not be used to decide whether or not to accept your health care providers advice, instructions or recommendations. Only your health care provider has the knowledge and training to provide advice that is right for you.  Copyright   Copyright © 2021 [employer] and its affiliates and/or licensors. All rights reserved.  Patient Education       Obesity Discharge Instructions, Adult   About this topic   Obesity is a health problem where your weight is higher than it should be. Doctors use a method called body mass index or BMI to measure whether you are at a healthy weight for your height. The doctor uses your weight and your height to  determine your BMI. A high BMI can be an indicator of high body fat. Obesity is different from being overweight. Being overweight means your BMI is 25 or higher. Being obese means your BMI is 30 or higher. If your BMI is 40 or higher, you are considered severely or morbidly obese. Being obese may lead to many health problems. It may make it hard for you to breathe and move easily. It may raise your risk for illnesses like high blood sugar and heart disease.  What care is needed at home?   Ask your doctor what you need to do when you go home. Make sure you understand everything the doctor says. This way you will know what you need to do.  Change your diet. Lower the calories in your diet. Ask your dietitian to help you set an eating plan that is right for you.  Get enough exercise. Talk to your doctor about the right amount of exercise for you.  Do not smoke.  Limit the amount of beer, wine, and mixed drinks (alcohol) you drink.  Keep a record of your weight. Write down what you eat and drink each day. This may help you be more aware of the choices you are making.  What drugs may be needed?   The doctor may order drugs to:  Treat health problems that may cause weight gain  Lower your appetite  Help you lose weight  Will physical activity be limited?   Talk to your doctor about the right amount of exercise for you. This is especially important if you have heart problems, other illnesses, or if you recently had surgery.  Start slowly by doing more everyday activities like yard work or household chores.  Choose activities that you like to do.  What changes to diet are needed?   Whole grains are a good source of carbohydrates and fiber. Try to eat 3 to 5 servings of whole grain, high fiber foods each day. These are things like whole grain bread, bran cereals, brown rice, and whole wheat pasta.  Fruits and vegetables are good sources of fiber, vitamins, and minerals. Try to pick many kinds and colors. This will help you  get different nutrients in your diet. Choose fresh, frozen, or canned fruits and vegetables. Buy plain, frozen fruits without added sugar. Buy plain, frozen vegetables without added salt and fat. Buy canned fruit in 100% juice or water. Avoid canned fruit in syrups. Buy canned vegetables with no salt added.  Milk is a good source of protein and some vitamins and minerals. Choose low-fat (1%) or fat-free milk. Eat nonfat or low-fat cheeses, ice creams, yogurt, and other dairy products.  Meats and beans are good sources of protein and iron. Eat more low-fat or lean meats like chicken without the skin, turkey without the skin, and fish. Eggs and peanut butter are good sources of protein as well. Dried peas, beans, and lentils are also good and contain fiber. Fatty fish, like salmon, tuna, mackerel, herring, and trout, are good to eat and have healthy omega-3 fats.  Good fats can give you long-term energy. These are found in fish, nuts, seeds, and avocados. Try using olive oil, safflower oil, and low-sodium, low-fat salad dressing as a topping on foods. Use canola, olive, or peanut oil for cooking. Other healthy oils include corn, sunflower, and soybean oils.  Limit sweets such as candy and sugary drinks. Try to drink water instead. Drink diet or no calorie beverages when you want something other than water.  Cut back on solid fats, like butter, lard, and coconut oil.  Limit fatty foods such as desserts, fried foods, and chips.  Trans fats should be avoided. Most trans fats are found in processed foods, commercially baked goods, and fried foods and are very unhealthy. Saturated fat, which is different from trans fat, should be watched and limited if portions are too large. Saturated fat is found mainly in animal sources, such as meat and dairy products. Saturated fat is also found in coconut and palm oils.  Limit processed meats and most processed foods.  Limit eating out. If you choose to visit a restaurant, ask for  the nutritional facts. You may also be able to find nutritional facts online. Then, you can make a plan and choose healthy items. Watch the portion size. Have large portions split and take part home for some other meal.  What problems could happen?   Low mood or self-esteem  Anxiety  Muscle pain, joint pain, or arthritis  Sleep apnea  Diabetes  High blood pressure  High cholesterol  Heart, lung, or breathing problems  Kidney or liver problems  Cancer  Gallstones  Increased sweating  Reduced fertility  Pregnancy complications  Gastroesophageal reflux disease  When do I need to call the doctor?   Signs of high or low blood sugar  Thoughts of hurting yourself  Teach Back: Helping You Understand   The Teach Back Method helps you understand the information we are giving you. After you talk with the staff, tell them in your own words what you learned. This helps to make sure the staff has described each thing clearly. It also helps to explain things that may have been confusing. Before going home, make sure you can do these:  I can tell you about my condition.  I can tell you what changes I need to make with my diet or drugs.  I can tell you what I will do if I have signs of a heart attack or stroke.  Where can I learn more?   Centers for Disease Control and Prevention  http://www.cdc.gov/obesity/adult/defining.html   NHS  http://www.nhs.uk/Conditions/Obesity/Pages/obesityprevention.aspx   Last Reviewed Date   2021-06-23  Consumer Information Use and Disclaimer   This information is not specific medical advice and does not replace information you receive from your health care provider. This is only a brief summary of general information. It does NOT include all information about conditions, illnesses, injuries, tests, procedures, treatments, therapies, discharge instructions or life-style choices that may apply to you. You must talk with your health care provider for complete information about your health and treatment  options. This information should not be used to decide whether or not to accept your health care providers advice, instructions or recommendations. Only your health care provider has the knowledge and training to provide advice that is right for you.  Copyright   Copyright © 2021 [employer]. and its affiliates and/or licensors. All rights reserved.    Patient Education        Yearly Physical for Adults   About this topic   Most people do not want to be sick. Having a checkup each year with your doctor is one way to help you stay healthy. You may need to see your doctor more or less often. How often you need to go to the doctor depends on your age. Your family and medical history also play a role in how often you need to go to the doctor. Going to see your doctor on a routine basis can help you find problems early or even before they start. This may make it easier to treat or cure your problem.  General   Your doctor will talk about many things during your checkup. Your doctor may ask about:  Your medical and family history.  All the drugs you are taking. Be sure to include all prescription, over the counter, and herbal supplements. Tell the doctor if you have any drug allergy. Bring a list of drugs you take with you.  How you are feeling and if you are having any problems.  Risky behaviors like smoking, drinking alcohol, using illegal drugs, not wearing seatbelts, having unprotected sex, etc.  Your doctor will do a physical exam and may check your:  Height and weight  Blood pressure  Reflexes  Memory  Vision  Hearing  Your doctor may order:  Lab tests  ECG to check your heart rhythm  X-rays  Tests or treatments based on your exam  What lifestyle changes are needed?   Your doctor may suggest you make changes to your lifestyle at this visit. The doctor may talk with you about being more active or lowering stress levels. Ask your doctor what you need to do.  What drugs may be needed?   Your doctor may order drugs  or vaccines to protect you from illnesses.  What changes to diet are needed?   Talk to your doctor to see if any changes are needed to your diet.  When do I need to call the doctor?   Call your doctor if you need to learn about any test results. Together you can make a plan for more care.  Helpful tips   Make a list of questions for your doctor before you go. This will help you remember to ask about any concerns. Write down any answers from your doctor so you can look over them after your visit.   Tell your doctor about any changes in your body or health since your last visit.  Ask your doctor about any screening tests you need.  Where can I learn more?   American Academy of Family Physicians  http://familydoctor.org/familydoctor/en/prevention-wellness/staying-healthy/healthy-living/preventive-services-for-healthy-living.printerview.html   Centers for Disease Control  http://www.cdc.gov/family/checkup/   Last Reviewed Date   2019-04-22  Consumer Information Use and Disclaimer   This information is not specific medical advice and does not replace information you receive from your health care provider. This is only a brief summary of general information. It does NOT include all information about conditions, illnesses, injuries, tests, procedures, treatments, therapies, discharge instructions or life-style choices that may apply to you. You must talk with your health care provider for complete information about your health and treatment options. This information should not be used to decide whether or not to accept your health care providers advice, instructions or recommendations. Only your health care provider has the knowledge and training to provide advice that is right for you.  Copyright   Copyright © 2021 UpToDate, Inc. and its affiliates and/or licensors. All rights reserved. Patient Education       Quitting Smoking   The Basics   Written by the doctors and editors at [employer]   What are the benefits of  "quitting smoking? -- Quitting smoking can dramatically improve your health and help you live longer. It lowers your risk of heart disease, lung disease, kidney failure, infection, and cancer.   Quitting smoking can also lower your chances of getting osteoporosis, a condition that makes your bones weak. Plus, it can help your skin look younger and reduce the chances that you will have problems with sex.  Quitting is not easy for most people, and it can take several tries to completely quit. But help and support are available. Quitting smoking will improve your health no matter how old you are, even if you have smoked for a long time.   What should I do if I want to quit smoking? -- It's a good idea to start by talking with your doctor or nurse. It is possible to quit on your own, without help. But getting help greatly increases your chances of quitting successfully.  When you are ready to quit, you will make a plan to:  Set a quit date  Tell your family and friends that you plan to quit  Plan ahead for the challenges you will face, such as cigarette cravings  Remove cigarettes from your home, car, and work  How can my doctor or nurse help? -- Your doctor or nurse can give you advice on the best way to quit. They can also give you medicines to:  Reduce your craving for cigarettes  Reduce your "withdrawal" symptoms (symptoms that happen when you stop smoking)  Your doctor or nurse can also help you find a counselor to talk to. For most people who are trying to quit smoking, it works best to use both medicines and counseling.  You can also get help from a free phone line (7-794-QUIT-NOW or 1-273.354.2700) or go online to www.smokefree.gov.  What are the symptoms of withdrawal? -- When you stop smoking, you might have symptoms such as:  Trouble sleeping  Feeling irritable, anxious, or restless  Getting frustrated or angry  Having trouble thinking clearly  These symptoms can be hard to deal with, which is why it can be so " hard to quit. But medicines can help.  Some people who stop smoking become temporarily depressed. Some people need treatment for depression, such as counseling or medicines or both. People with depression might:  No longer enjoy or care about doing the things they used to like to do  Feel sad, down, hopeless, nervous, or cranky most of the day, almost every day  Lose or gain weight  Sleep too much or too little  Feel tired or like they have no energy  Feel guilty or like they are worth nothing  Forget things or feel confused  Move and speak more slowly than usual  Act restless or have trouble staying still  Think about death or suicide  If you think you might be depressed, tell your doctor or nurse right away. They can talk to you about your symptoms and recommend treatment if needed.  If you ever feel like you might hurt yourself, go straight to the nearest emergency department or call for an ambulance (in the [place] and [place], dial 9-1-1). You can also call your doctor or nurse and tell them it is an emergency, or reach the  National Suicide Prevention Lifeline at 1-384.201.5005 or www.suicidepreventionlifeline.org.  How does counseling work? -- A counselor can help you figure out:  What triggers you to want to smoke, and how to handle these situations  How to resist cravings  What you can do differently if you have tried to quit before  You can meet with a counselor in one-on-one sessions or as part of a group. There are other ways to get counseling, too, such as over the phone, through text messaging, or online.   How do medicines help you stop smoking? -- Different medicines work in different ways:  Nicotine replacement therapy - Nicotine is the main drug in cigarettes, and the reason they are addictive. These medicines reduce your body's craving for nicotine. They also help with withdrawal symptoms.  There are different forms of nicotine replacement, including skin patches, lozenges, gum, nasal sprays, and  "inhalers. Most can be bought without a prescription. Also, health insurance might cover some or all of the cost.  It often helps to use 2 forms of nicotine replacement. For example, you might wear a patch all the time, plus use gum or lozenges when you get a craving to smoke.  Varenicline - Varenicline (brand names: Chantix, Champix) is a prescription medicine that reduces withdrawal symptoms and cigarette cravings. Varenicline can increase the effects of alcohol in some people. It's a good idea to limit drinking while you're taking it, at least until you know how it affects you.  Even if you are not yet ready to commit to a quit date, varenicline can help reduce cravings. This can make it easier to quit when you are ready.  Bupropion - Bupropion (sample brand names: Wellbutrin, Zyban) is a prescription medicine that reduces your desire to smoke. It is also available in a generic version, which is cheaper than the brand name medicines. Doctors do not usually prescribe bupropion for people with seizures or who have had seizures in the past.  It might also be helpful to combine nicotine replacement with bupropion or varenicline. In some cases, a person might even take both bupropion and varenicline. Your doctor or nurse can help you figure out the best combination for you.  What about e-cigarettes? -- Sometimes people wonder if using electronic cigarettes, or "e-cigarettes," can help them quit smoking. Using e-cigarettes is also called "vaping." Doctors do not recommend e-cigarettes in place of medicines and counseling. That's because e-cigarettes still contain nicotine as well as other substances that might be harmful. It's also not clear how they can affect a person's health in the long term.  What if I am pregnant and I smoke? -- If you are pregnant, it's really important for the health of your baby that you quit. Ask your doctor what options you have, and what is safest for your baby.  Will I gain weight if I " quit? -- You might gain a few pounds. This can be frustrating for some people, but it's important to remember that you are improving your health by quitting smoking. You can help prevent gaining a lot of weight by staying active and eating a healthy diet.  What if I am not able to quit? -- If you don't quit on your first try, or if you quit but then start smoking again, don't give up hope. Lots of people have to try more than once before they are able to completely quit.  It might help to try to understand why quitting did not work. There might be something you can do differently when you try again. It can help to figure out what situations make you want to smoke, so you can avoid them.   What else can I do to improve my chances of quitting? -- You can:  Get regular exercise. Any type of physical activity, even gentle forms of movement, is good for your health. Physical activity can also help reduce stress.  Stay away from smokers and places that make you want to smoke. If people close to you smoke, ask them to quit with you or avoid smoking around you.  Carry gum, hard candy, or something to put in your mouth. If you get a craving for a cigarette, try one of these instead.  Don't give up, even if you start smoking again. It takes most people a few tries before they succeed.  All topics are updated as new evidence becomes available and our peer review process is complete.  This topic retrieved from [employer] on: Sep 21, 2021.  Topic 54921 Version 22.0  Release: 29.4.2 - C29.263  © 2021 [employer] and/or its affiliates. All rights reserved.  Consumer Information Use and Disclaimer   This information is not specific medical advice and does not replace information you receive from your health care provider. This is only a brief summary of general information. It does NOT include all information about conditions, illnesses, injuries, tests, procedures, treatments, therapies, discharge instructions or life-style  choices that may apply to you. You must talk with your health care provider for complete information about your health and treatment options. This information should not be used to decide whether or not to accept your health care provider's advice, instructions or recommendations. Only your health care provider has the knowledge and training to provide advice that is right for you. The use of this information is governed by the [employer] End User License Agreement, available at https://www.[email or web address]/[email or web address]/noemi.The use of [employer] content is governed by the [employer] Terms of Use. ©2021 [employer] All rights reserved.  Copyright   © 2021 [employer] and/or its affiliates. All rights reserved.

## 2024-07-19 ENCOUNTER — LAB VISIT (OUTPATIENT)
Dept: LAB | Facility: HOSPITAL | Age: 64
End: 2024-07-19
Payer: MEDICARE

## 2024-07-19 ENCOUNTER — TELEPHONE (OUTPATIENT)
Dept: BARIATRICS | Facility: CLINIC | Age: 64
End: 2024-07-19
Payer: MEDICARE

## 2024-07-19 DIAGNOSIS — Z12.5 PROSTATE CANCER SCREENING ENCOUNTER, OPTIONS AND RISKS DISCUSSED: ICD-10-CM

## 2024-07-19 DIAGNOSIS — Z11.4 ENCOUNTER FOR SCREENING FOR HIV: ICD-10-CM

## 2024-07-19 DIAGNOSIS — I10 ESSENTIAL HYPERTENSION: ICD-10-CM

## 2024-07-19 DIAGNOSIS — E78.2 MIXED HYPERLIPIDEMIA: ICD-10-CM

## 2024-07-19 DIAGNOSIS — Z00.00 ROUTINE GENERAL MEDICAL EXAMINATION AT A HEALTH CARE FACILITY: ICD-10-CM

## 2024-07-19 DIAGNOSIS — R73.03 PREDIABETES: ICD-10-CM

## 2024-07-19 LAB
ALBUMIN SERPL BCP-MCNC: 3.6 G/DL (ref 3.5–5.2)
ALP SERPL-CCNC: 88 U/L (ref 55–135)
ALT SERPL W/O P-5'-P-CCNC: 37 U/L (ref 10–44)
ANION GAP SERPL CALC-SCNC: 7 MMOL/L (ref 8–16)
AST SERPL-CCNC: 31 U/L (ref 10–40)
BASOPHILS # BLD AUTO: 0.03 K/UL (ref 0–0.2)
BASOPHILS NFR BLD: 0.4 % (ref 0–1.9)
BILIRUB SERPL-MCNC: 0.9 MG/DL (ref 0.1–1)
BUN SERPL-MCNC: 13 MG/DL (ref 8–23)
CALCIUM SERPL-MCNC: 9.4 MG/DL (ref 8.7–10.5)
CHLORIDE SERPL-SCNC: 103 MMOL/L (ref 95–110)
CHOLEST SERPL-MCNC: 118 MG/DL (ref 120–199)
CHOLEST/HDLC SERPL: 2.4 {RATIO} (ref 2–5)
CO2 SERPL-SCNC: 31 MMOL/L (ref 23–29)
COMPLEXED PSA SERPL-MCNC: 0.33 NG/ML (ref 0–4)
CREAT SERPL-MCNC: 0.7 MG/DL (ref 0.5–1.4)
DIFFERENTIAL METHOD BLD: ABNORMAL
EOSINOPHIL # BLD AUTO: 0.5 K/UL (ref 0–0.5)
EOSINOPHIL NFR BLD: 5.7 % (ref 0–8)
ERYTHROCYTE [DISTWIDTH] IN BLOOD BY AUTOMATED COUNT: 13.2 % (ref 11.5–14.5)
EST. GFR  (NO RACE VARIABLE): >60 ML/MIN/1.73 M^2
ESTIMATED AVG GLUCOSE: 120 MG/DL (ref 68–131)
GLUCOSE SERPL-MCNC: 111 MG/DL (ref 70–110)
HBA1C MFR BLD: 5.8 % (ref 4–5.6)
HCT VFR BLD AUTO: 45.1 % (ref 40–54)
HDLC SERPL-MCNC: 49 MG/DL (ref 40–75)
HDLC SERPL: 41.5 % (ref 20–50)
HGB BLD-MCNC: 14.6 G/DL (ref 14–18)
HIV 1+2 AB+HIV1 P24 AG SERPL QL IA: NORMAL
IMM GRANULOCYTES # BLD AUTO: 0.02 K/UL (ref 0–0.04)
IMM GRANULOCYTES NFR BLD AUTO: 0.2 % (ref 0–0.5)
LDLC SERPL CALC-MCNC: 56.4 MG/DL (ref 63–159)
LYMPHOCYTES # BLD AUTO: 1.9 K/UL (ref 1–4.8)
LYMPHOCYTES NFR BLD: 23.6 % (ref 18–48)
MCH RBC QN AUTO: 31.1 PG (ref 27–31)
MCHC RBC AUTO-ENTMCNC: 32.4 G/DL (ref 32–36)
MCV RBC AUTO: 96 FL (ref 82–98)
MONOCYTES # BLD AUTO: 0.9 K/UL (ref 0.3–1)
MONOCYTES NFR BLD: 11 % (ref 4–15)
NEUTROPHILS # BLD AUTO: 4.8 K/UL (ref 1.8–7.7)
NEUTROPHILS NFR BLD: 59.1 % (ref 38–73)
NONHDLC SERPL-MCNC: 69 MG/DL
NRBC BLD-RTO: 0 /100 WBC
PLATELET # BLD AUTO: 185 K/UL (ref 150–450)
PMV BLD AUTO: 13 FL (ref 9.2–12.9)
POTASSIUM SERPL-SCNC: 4 MMOL/L (ref 3.5–5.1)
PROT SERPL-MCNC: 6.7 G/DL (ref 6–8.4)
RBC # BLD AUTO: 4.69 M/UL (ref 4.6–6.2)
SODIUM SERPL-SCNC: 141 MMOL/L (ref 136–145)
T4 FREE SERPL-MCNC: 0.98 NG/DL (ref 0.71–1.51)
TRIGL SERPL-MCNC: 63 MG/DL (ref 30–150)
TSH SERPL DL<=0.005 MIU/L-ACNC: 1.02 UIU/ML (ref 0.4–4)
WBC # BLD AUTO: 8.12 K/UL (ref 3.9–12.7)

## 2024-07-19 PROCEDURE — 84439 ASSAY OF FREE THYROXINE: CPT | Mod: HCNC | Performed by: NURSE PRACTITIONER

## 2024-07-19 PROCEDURE — 85025 COMPLETE CBC W/AUTO DIFF WBC: CPT | Mod: HCNC | Performed by: NURSE PRACTITIONER

## 2024-07-19 PROCEDURE — 83036 HEMOGLOBIN GLYCOSYLATED A1C: CPT | Mod: HCNC | Performed by: NURSE PRACTITIONER

## 2024-07-19 PROCEDURE — 80053 COMPREHEN METABOLIC PANEL: CPT | Mod: HCNC | Performed by: NURSE PRACTITIONER

## 2024-07-19 PROCEDURE — 84153 ASSAY OF PSA TOTAL: CPT | Mod: HCNC | Performed by: NURSE PRACTITIONER

## 2024-07-19 PROCEDURE — 84443 ASSAY THYROID STIM HORMONE: CPT | Mod: HCNC | Performed by: NURSE PRACTITIONER

## 2024-07-19 PROCEDURE — 36415 COLL VENOUS BLD VENIPUNCTURE: CPT | Mod: HCNC,PO | Performed by: NURSE PRACTITIONER

## 2024-07-19 PROCEDURE — 87389 HIV-1 AG W/HIV-1&-2 AB AG IA: CPT | Mod: HCNC | Performed by: NURSE PRACTITIONER

## 2024-07-19 PROCEDURE — 80061 LIPID PANEL: CPT | Mod: HCNC | Performed by: NURSE PRACTITIONER

## 2024-07-25 ENCOUNTER — PATIENT OUTREACH (OUTPATIENT)
Dept: ADMINISTRATIVE | Facility: HOSPITAL | Age: 64
End: 2024-07-25
Payer: MEDICARE

## 2024-12-07 DIAGNOSIS — M21.162 GENU VARUM, ACQUIRED, LEFT: ICD-10-CM

## 2024-12-07 DIAGNOSIS — M17.12 PRIMARY OSTEOARTHRITIS OF LEFT KNEE: ICD-10-CM

## 2024-12-09 RX ORDER — CELECOXIB 200 MG/1
200 CAPSULE ORAL 2 TIMES DAILY
Qty: 180 CAPSULE | Refills: 3 | Status: SHIPPED | OUTPATIENT
Start: 2024-12-09

## (undated) DEVICE — PAD ABD 8X10 STERILE

## (undated) DEVICE — APPLICATOR CHLORAPREP ORN 26ML

## (undated) DEVICE — PAD COLD THERAPY KNEE WRAP ON

## (undated) DEVICE — SUT MONOCRYL 3-0 PS-2 UND

## (undated) DEVICE — DRAPE STERI INSTRUMENT 1018

## (undated) DEVICE — UNDERGLOVE BIOGEL PI SZ 6.5 LF

## (undated) DEVICE — SOL IRR NACL .9% 3000ML

## (undated) DEVICE — SEE MEDLINE ITEM 157150

## (undated) DEVICE — ADHESIVE DERMABOND ADVANCED

## (undated) DEVICE — SUT VICRYL PLUS 3-0 SH 18IN

## (undated) DEVICE — KIT IRR SUCTION HND PIECE

## (undated) DEVICE — Device

## (undated) DEVICE — SEE MEDLINE ITEM 146298

## (undated) DEVICE — PUMP COLD THERAPY

## (undated) DEVICE — BOWL CEMENT

## (undated) DEVICE — NDL 22GA X1 1/2 REG BEVEL

## (undated) DEVICE — TIP YANKAUERS BULB NO VENT

## (undated) DEVICE — SYR ONLY LUER LOCK 20CC

## (undated) DEVICE — DRAPE OPTIMA MAJOR PEDIATRIC

## (undated) DEVICE — GOWN SMARTGOWN 3XL XLONG

## (undated) DEVICE — ELECTRODE REM PLYHSV RETURN 9

## (undated) DEVICE — MASK FLYTE HOOD PEEL AWAY

## (undated) DEVICE — DRAPE INCISE IOBAN 2 23X33IN

## (undated) DEVICE — BANDAGE ESMARK 6X12

## (undated) DEVICE — BLADE SAGITTAL 18 X 1.27 X 90M

## (undated) DEVICE — TOURNIQUET SB QC DP 34X4IN

## (undated) DEVICE — KIT TOTAL KNEE TKOFG

## (undated) DEVICE — DRESSING AQUACEL AG 3.5X10IN

## (undated) DEVICE — BANDAGE KERLIX AMD

## (undated) DEVICE — SUT VICRYL+ 1 CT1 18IN

## (undated) DEVICE — TAPE SURG DURAPORE 2 SGL USE

## (undated) DEVICE — DRESSING AQUACEL AG ADV 3.5X6

## (undated) DEVICE — SLEEVE SCD EXPRESS KNEE MEDIUM

## (undated) DEVICE — GLOVE BIOGEL SKINSENSE PI 7.0

## (undated) DEVICE — DRESSING AQUACEL FOAM 4X4IN

## (undated) DEVICE — SUT STRATAFIX 1 PDS CT-1